# Patient Record
Sex: MALE | Race: WHITE | NOT HISPANIC OR LATINO | Employment: OTHER | URBAN - METROPOLITAN AREA
[De-identification: names, ages, dates, MRNs, and addresses within clinical notes are randomized per-mention and may not be internally consistent; named-entity substitution may affect disease eponyms.]

---

## 2017-02-24 DIAGNOSIS — I10 ESSENTIAL HYPERTENSION, BENIGN: ICD-10-CM

## 2017-02-24 RX ORDER — LOSARTAN POTASSIUM 100 MG/1
100 TABLET ORAL DAILY
Qty: 90 TABLET | Refills: 2 | Status: SHIPPED | OUTPATIENT
Start: 2017-02-24 | End: 2018-04-12

## 2017-02-24 NOTE — TELEPHONE ENCOUNTER
losartan (COZAAR) 100 MG tablet      Last Written Prescription Date: 8/09/2016  Last Fill Quantity: 90, # refills: 3  Last Office Visit with G, P or [facility] prescribing provider: 11/22/2016       Potassium   Date Value Ref Range Status   11/22/2016 4.3 3.4 - 5.3 mmol/L Final     Creatinine   Date Value Ref Range Status   11/22/2016 0.75 0.66 - 1.25 mg/dL Final     BP Readings from Last 3 Encounters:   12/20/16 (!) 147/98   12/05/16 137/79   11/22/16 142/72

## 2017-04-20 ENCOUNTER — MYC MEDICAL ADVICE (OUTPATIENT)
Dept: INTERNAL MEDICINE | Facility: CLINIC | Age: 67
End: 2017-04-20

## 2017-04-20 DIAGNOSIS — N52.9 IMPOTENCE OF ORGANIC ORIGIN: ICD-10-CM

## 2017-04-20 RX ORDER — SILDENAFIL 100 MG/1
100 TABLET, FILM COATED ORAL DAILY PRN
Qty: 6 TABLET | Refills: 8 | Status: SHIPPED | OUTPATIENT
Start: 2017-04-20 | End: 2017-04-20

## 2017-04-20 RX ORDER — SILDENAFIL 100 MG/1
100 TABLET, FILM COATED ORAL DAILY PRN
Qty: 6 TABLET | Refills: 8 | Status: SHIPPED | OUTPATIENT
Start: 2017-04-20 | End: 2018-02-15

## 2017-05-31 ENCOUNTER — OFFICE VISIT (OUTPATIENT)
Dept: SURGERY | Facility: CLINIC | Age: 67
End: 2017-05-31
Payer: COMMERCIAL

## 2017-05-31 VITALS
HEART RATE: 86 BPM | SYSTOLIC BLOOD PRESSURE: 145 MMHG | HEIGHT: 70 IN | DIASTOLIC BLOOD PRESSURE: 80 MMHG | BODY MASS INDEX: 26.63 KG/M2 | WEIGHT: 186 LBS

## 2017-05-31 DIAGNOSIS — R10.31 RIGHT GROIN PAIN: Primary | ICD-10-CM

## 2017-05-31 PROCEDURE — 99213 OFFICE O/P EST LOW 20 MIN: CPT | Performed by: SURGERY

## 2017-05-31 NOTE — LETTER
May 31, 2017      RE:  Saji Morrison-:  [birth year]    Patient returns in follow-up after laparoscopic inguinal hernia repair performed in 2016.  His initial consultation to place in 2016.  At that time he was experiencing right lower quadrant abdominal pain with physical exercise and prolonged standing.  He noted this also with playing golf. He was found on examination to have a right inguinal hernia.  He was taken to surgery on 2016 whereby an indirect right inguinal hernia as well as an indirect left inguinal hernia were fixed laparoscopically.  He was seen in follow-up on  and seemed to be doing reasonably well. He then went to [place] for the winter time.  He took a relatively prolonged period of recovery avoiding significant activity.  He however then slowly return to his normal activity.  Over time he is redeveloped pain within the right lower quadrant and inguinal/groin area particularly while playing golf.  He states this can get his severe as a seven out of 10.  He has been medicating with ibuprofen which seems to give relatively good pain control.  In his own words he describes this pain as being basically the same as what he had been experiencing prior to hernia repair.     On examination, his incisions are well-healed.  There is no evidence of recurrent hernia.  There is mild tenderness around the pubic tubercle on the right-hand side as well as within the inguinal canal.     I discussed with the patient his management options.  In the absence of recurrent hernia not certain what is continuing to cause his pain.  We discussed the fact that his preoperative pain was likely the best predictor of having some degree of postoperative pain particularly given the circumstance that he describes the symptoms as basically the same.  We discussed evaluation of this and have decided to proceed with an MRI of his pelvis.  We will get this imaging study and we will  re-visit the issue and decide upon a course of action.  His questions were all answered.       Alec Johnson MD

## 2017-05-31 NOTE — MR AVS SNAPSHOT
After Visit Summary   5/31/2017    Saji Morrison    MRN: [id number]           Patient Information     Date Of Birth          1950        Visit Information        Provider Department      5/31/2017 2:15 PM Alec Johnson MD [facility]      Today's Diagnoses     Right groin pain    -  1       Follow-ups after your visit        Your next 10 appointments already scheduled     Jun 05, 2017  5:00 PM CDT   MR PELVIS W/O & W CONTRAST with [id number]   [facility]    [address] [place] [address]   [phone]           Take your medicines as usual, unless your doctor tells you not to. Bring a list of your current medicines to your exam (including vitamins, minerals and over-the-counter drugs).  You will be given intravenous contrast for this exam. To prepare:   The day before your exam, drink extra fluids at least six 8-ounce glasses (unless your doctor tells you to restrict your fluids).   Have a blood test (creatinine test) within 30 days of your exam. Go to your clinic or Diagnostic Imaging Department for this test.  The MRI machine uses a strong magnet. Please wear clothes without metal (snaps, zippers). A sweatsuit works well, or we may give you a hospital gown.  Please remove any body piercings and hair extensions before you arrive. You will also remove watches, jewelry, hairpins, wallets, dentures, partial dental plates and hearing aids. You may wear contact lenses, and you may be able to wear your rings. We have a safe place to keep your personal items, but it is safer to leave them at home.   **IMPORTANT** THE INSTRUCTIONS BELOW ARE ONLY FOR THOSE PATIENTS WHO HAVE BEEN TOLD THEY WILL RECEIVE SEDATION OR GENERAL ANESTHESIA DURING THEIR MRI PROCEDURE:  IF YOU WILL RECEIVE SEDATION (take medicine to help you relax during your exam):   You must get the medicine from your  doctor before you arrive. Bring the medicine to the exam. Do not take it at home.   Arrive one hour early. Bring someone who can take you home after the test. Your medicine will make you sleepy. After the exam, you may not drive, take a bus or take a taxi by yourself.   No eating 8 hours before your exam. You may have clear liquids up until 4 hours before your exam. (Clear liquids include water, clear tea, black coffee and fruit juice without pulp.)  IF YOU WILL RECEIVE ANESTHESIA (be asleep for your exam):   Arrive 1 1/2 hours early. Bring someone who can take you home after the test. You may not drive, take a bus or take a taxi by yourself.   No eating 8 hours before your exam. You may have clear liquids up until 4 hours before your exam. (Clear liquids include water, clear tea, black coffee and fruit juice without pulp.)  Please call the Imaging Department at your exam site with any questions.            Jun 12, 2017 10:00 AM CDT   Return Visit with Alec Johnson MD   [facility]    [address], [address]  [facility] [address] [phone]              Future tests that were ordered for you today     Open Future Orders        Priority Expected Expires Ordered    MR Pelvis w/o & w Contrast Routine 5/31/2017 5/31/2018 5/31/2017            Who to contact     If you have questions or need follow up information about today's clinic visit or your schedule please contact [facility] directly at [phone].  Normal or non-critical lab and imaging results will be communicated to you by MyChart, letter or phone within 4 business days after the clinic has received the results. If you do not hear from us within 7 days, please contact the clinic through [facility] or phone. If you have a critical or abnormal lab result, we will notify you by phone as soon as possible.  Submit refill requests through [employer] or call your pharmacy and they will forward  "the refill request to us. Please allow 3 business days for your refill to be completed.          Additional Information About Your Visit        [employer] Information     [employer] gives you secure access to your electronic health record. If you see a primary care provider, you can also send messages to your care team and make appointments. If you have questions, please call your primary care clinic.  If you do not have a primary care provider, please call [phone] and they will assist you.        Care EveryWhere ID     This is your Care EveryWhere ID. This could be used by other organizations to access your [place] medical records  [id number]        Your Vitals Were     Pulse Height BMI (Body Mass Index)             86 5' 10\" (1.778 m) 26.69 kg/m2          Blood Pressure from Last 3 Encounters:   05/31/17 145/80   12/20/16 (!) 147/98   12/05/16 137/79    Weight from Last 3 Encounters:   05/31/17 186 lb (84.4 kg)   12/05/16 191 lb (86.6 kg)   11/22/16 194 lb 12.8 oz (88.4 kg)               Primary Care Provider Office Phone # Fax #    Yared Gallardo [phone]       [facility] [address] [facility] [address]        Thank you!     Thank you for choosing [facility]  for your care. Our goal is always to provide you with excellent care. Hearing back from our patients is one way we can continue to improve our services. Please take a few minutes to complete the written survey that you may receive in the mail after your visit with us. Thank you!             Your Updated Medication List - Protect others around you: Learn how to safely use, store and throw away your medicines at www.disposemymeds.org.          This list is accurate as of: 5/31/17  2:54 PM.  Always use your most recent med list.                   Brand Name Dispense Instructions for use    HYDROcodone-acetaminophen 5-325 MG per tablet    NORCO    20 tablet    Take 1-2 tablets by mouth every 4 hours as " needed for moderate to severe pain maximum 6 tablet(s) per day       losartan 100 MG tablet    COZAAR    90 tablet    Take 1 tablet (100 mg) by mouth daily       magnesium hydroxide 400 MG/5ML suspension    MILK OF MAGNESIA    360 mL    Take 30 mLs by mouth 2 times daily as needed for constipation       oxyCODONE 5 MG IR tablet    ROXICODONE    30 tablet    Take 1-2 tablets (5-10 mg) by mouth every 3 hours as needed for pain or other (Moderate to Severe)       sildenafil 100 MG cap/tab    VIAGRA    6 tablet    Take 1 tablet (100 mg) by mouth daily as needed for erectile dysfunction at least 30 minutes before intercourse.

## 2017-06-01 NOTE — PROGRESS NOTES
Patient returns in follow-up after laparoscopic inguinal hernia repair performed in December 2016.  His initial consultation to place in November 2016.  At that time he was experiencing right lower quadrant abdominal pain with physical exercise and prolonged standing.  He noted this also with playing golf.  He was found on examination to have a right inguinal hernia.  He was taken to surgery on December 5, 2016 whereby an indirect right inguinal hernia as well as an indirect left inguinal hernia were fixed laparoscopically.  He was seen in follow-up on 20 December and seemed to be doing reasonably well.  He then went to [place] for the winter time.  He took a relatively prolonged period of recovery avoiding significant activity.  He however then slowly return to his normal activity.  Over time he is redeveloped pain within the right lower quadrant and inguinal/groin area particularly while playing golf.  He states this can get his severe as a seven out of 10.  He has been medicating with ibuprofen which seems to give relatively good pain control.  In his own words he describes this pain as being basically the same as what he had been experiencing prior to hernia repair.    On examination, his incisions are well-healed.  There is no evidence of recurrent hernia.  There is mild tenderness around the pubic tubercle on the right-hand side as well as within the inguinal canal.    I discussed with the patient his management options.  In the absence of recurrent hernia not certain what is continuing to cause his pain.  We discussed the fact that his preoperative pain was likely the best predictor of having some degree of postoperative pain particularly given the circumstance that he describes the symptoms as basically the same.  We discussed evaluation of this and have decided to proceed with an MRI of his pelvis.  We will get this imaging study and we will re-visit the issue and decide upon a course of action.  His  questions were all answered.  Total time spent was 25 minutes with good of a 50% in face-to-face consultation.    Please route or send letter to:  Primary Care Provider (PCP)

## 2017-06-05 ENCOUNTER — HOSPITAL ENCOUNTER (OUTPATIENT)
Dept: MRI IMAGING | Facility: CLINIC | Age: 67
Discharge: HOME | End: 2017-06-05
Attending: SURGERY | Admitting: SURGERY
Payer: MEDICARE

## 2017-06-05 DIAGNOSIS — R10.31 RIGHT GROIN PAIN: ICD-10-CM

## 2017-06-05 PROCEDURE — 72195 MRI PELVIS W/O DYE: CPT

## 2017-06-06 NOTE — PROGRESS NOTES
Result reviewed, result normal or expected, please call patient    No recommendations for further treatment  Hernia repair looks good

## 2017-08-21 ENCOUNTER — OFFICE VISIT (OUTPATIENT)
Dept: INTERNAL MEDICINE | Facility: CLINIC | Age: 67
End: 2017-08-21
Payer: COMMERCIAL

## 2017-08-21 VITALS
OXYGEN SATURATION: 97 % | BODY MASS INDEX: 27.3 KG/M2 | HEART RATE: 56 BPM | HEIGHT: 70 IN | SYSTOLIC BLOOD PRESSURE: 138 MMHG | TEMPERATURE: 98.6 F | WEIGHT: 190.7 LBS | DIASTOLIC BLOOD PRESSURE: 80 MMHG

## 2017-08-21 DIAGNOSIS — Z11.59 NEED FOR HEPATITIS C SCREENING TEST: ICD-10-CM

## 2017-08-21 DIAGNOSIS — Z00.00 MEDICARE ANNUAL WELLNESS VISIT, SUBSEQUENT: Primary | ICD-10-CM

## 2017-08-21 DIAGNOSIS — Z12.5 SPECIAL SCREENING FOR MALIGNANT NEOPLASM OF PROSTATE: ICD-10-CM

## 2017-08-21 DIAGNOSIS — I10 ESSENTIAL HYPERTENSION, BENIGN: ICD-10-CM

## 2017-08-21 DIAGNOSIS — E78.5 HYPERLIPIDEMIA LDL GOAL <130: ICD-10-CM

## 2017-08-21 DIAGNOSIS — Z12.11 COLON CANCER SCREENING: ICD-10-CM

## 2017-08-21 LAB
ALBUMIN SERPL-MCNC: 4.1 G/DL (ref 3.4–5)
ALP SERPL-CCNC: 97 U/L (ref 40–150)
ALT SERPL W P-5'-P-CCNC: 21 U/L (ref 0–70)
ANION GAP SERPL CALCULATED.3IONS-SCNC: 2 MMOL/L (ref 3–14)
AST SERPL W P-5'-P-CCNC: 25 U/L (ref 0–45)
BILIRUB SERPL-MCNC: 0.9 MG/DL (ref 0.2–1.3)
BUN SERPL-MCNC: 17 MG/DL (ref 7–30)
CALCIUM SERPL-MCNC: 9.1 MG/DL (ref 8.5–10.1)
CHLORIDE SERPL-SCNC: 106 MMOL/L (ref 94–109)
CHOLEST SERPL-MCNC: 230 MG/DL
CO2 SERPL-SCNC: 31 MMOL/L (ref 20–32)
CREAT SERPL-MCNC: 0.83 MG/DL (ref 0.66–1.25)
GFR SERPL CREATININE-BSD FRML MDRD: >90 ML/MIN/1.7M2
GLUCOSE SERPL-MCNC: 96 MG/DL (ref 70–99)
HDLC SERPL-MCNC: 88 MG/DL
HGB BLD-MCNC: 14 G/DL (ref 13.3–17.7)
LDLC SERPL CALC-MCNC: 122 MG/DL
NONHDLC SERPL-MCNC: 142 MG/DL
POTASSIUM SERPL-SCNC: 4.8 MMOL/L (ref 3.4–5.3)
PROT SERPL-MCNC: 7.9 G/DL (ref 6.8–8.8)
PSA SERPL-ACNC: 2.86 UG/L (ref 0–4)
SODIUM SERPL-SCNC: 139 MMOL/L (ref 133–144)
TRIGL SERPL-MCNC: 102 MG/DL

## 2017-08-21 PROCEDURE — 36415 COLL VENOUS BLD VENIPUNCTURE: CPT | Performed by: INTERNAL MEDICINE

## 2017-08-21 PROCEDURE — 80053 COMPREHEN METABOLIC PANEL: CPT | Performed by: INTERNAL MEDICINE

## 2017-08-21 PROCEDURE — 85018 HEMOGLOBIN: CPT | Performed by: INTERNAL MEDICINE

## 2017-08-21 PROCEDURE — G0103 PSA SCREENING: HCPCS | Performed by: INTERNAL MEDICINE

## 2017-08-21 PROCEDURE — 99397 PER PM REEVAL EST PAT 65+ YR: CPT | Performed by: INTERNAL MEDICINE

## 2017-08-21 PROCEDURE — 80061 LIPID PANEL: CPT | Performed by: INTERNAL MEDICINE

## 2017-08-21 PROCEDURE — 86803 HEPATITIS C AB TEST: CPT | Performed by: INTERNAL MEDICINE

## 2017-08-21 NOTE — MR AVS SNAPSHOT
After Visit Summary   8/21/2017    Saji Morrison    MRN: [id number]           Patient Information     Date Of Birth          1950        Visit Information        Provider Department      8/21/2017 9:20 AM Yared Gallardo MD [facility]        Today's Diagnoses     Medicare annual wellness visit, subsequent    -  1    Essential hypertension, benign        Hyperlipidemia LDL goal <130        Special screening for malignant neoplasm of prostate        Colon cancer screening        Need for hepatitis C screening test          Care Instructions          Services Typically covered by Medicare Recommended Completed   Vaccines    Pneumonoccol    Influenza    Hepatitis B (if medium/high risk)     Once for patients after age 65    Yearly  Medium/high risk factors:    End Stage Kidney Disease    Hemophiliacs who received Factor XIII or IX concentrates    Clients of institutions for developmentally disabled    Persons who live in same house as a Hepatitis B carrier    Homosexual men    Illicit injectable drug users    Health care workers     Mammogram Covered: One-time screen between age 35-39, annually for age 40+     Pap and Pelvic Exam Covered: Annually if  high risk,  or childbearing age with abnormal Pap in last 3 years.  Q24 months for all other women     Prostate Cancer Screening    Digital rectal exam    PSA Covered: Annually for all men > age 50     Corolrectal Cancer Screening Screening colonoscopy every 10 years, more often for high risk patients     Diabetes Self-Management Training Requires referral by treating physician for patient with diabetes     Diabetes Screening    Fasting blood sugar or glucose tolerance test   Once yearly, twice yearly if prediabetic     Cardiovascular Screening Blood Tests    Total Cholesterol    HDL    Triglycerides Every 5 years     Medical Nutrition Therapy for Diabetes or Renal Disease Requires referral by treating physician for  patient with diabetes or kidney disease     Glaucoma Screening Annually for patients with one of the following risk factors:    Diabetes Mellitus    Family history of Glaucoma    -American age 50 and over    -American age 65 and over     Bone Mass Measurement Every 24 months if one of the following risk factors:    Estrogen deficiency    Vertebral abnormalities on x-ray indicative of Osteoporosis, Osteopenia, or Vertebral fracture    Receiving/expected to receive the equivalent of at least 5 mg of Prednisone per day for > 3 months    Hyperparathyroidism    Patient being monitored for response to Osteoporosis Therapy     One-time AAA screen  Must be ordered as part of Medicare IPPE   Any patient with a family history of AAA    Males Age 65-75, with history of smoking at least 100 cigarettes in lifetime     Smoking Cessation Counseling Beneficiaries who use tobacco are eligible to receive 2 cessation attempts per year; each attempt includes maximum of 4 sessions     HIV Screening Annually for beneficiaries at increased risk:       Increased risk for HIV infection is defined in the  National Coverage Determinations (NCD) Manual,  Publication 100-03 Sections 190.14 (diagnostic) and 210.7 (screening). See http://www.cms.gov/manuals/downloads/qsh478g3_Fbhp5.pdf and http://www.cms.gov/manuals/downloads/osl380h8_Frhg2.pdf on the Internet.  Three times per pregnancy for beneficiaries who are pregnant.     Future Annual Wellness Visit Annually, for all beneficiaries.               Follow-ups after your visit        Follow-up notes from your care team     Return if symptoms worsen or fail to improve.      Future tests that were ordered for you today     Open Future Orders        Priority Expected Expires Ordered    Fecal colorectal cancer screen (FIT) Routine 9/11/2017 11/13/2017 8/21/2017            Who to contact     If you have questions or need follow up information about today's clinic visit or your  "schedule please contact [facility] directly at [phone].  Normal or non-critical lab and imaging results will be communicated to you by MyChart, letter or phone within 4 business days after the clinic has received the results. If you do not hear from us within 7 days, please contact the clinic through [email or web address] or phone. If you have a critical or abnormal lab result, we will notify you by phone as soon as possible.  Submit refill requests through [employer] or call your pharmacy and they will forward the refill request to us. Please allow 3 business days for your refill to be completed.          Additional Information About Your Visit        [email or web address] Information     [employer] gives you secure access to your electronic health record. If you see a primary care provider, you can also send messages to your care team and make appointments. If you have questions, please call your primary care clinic.  If you do not have a primary care provider, please call [phone] and they will assist you.        Care EveryWhere ID     This is your Care EveryWhere ID. This could be used by other organizations to access your [place] medical records  [id number]        Your Vitals Were     Pulse Temperature Height Pulse Oximetry BMI (Body Mass Index)       56 98.6  F (37  C) (Oral) 5' 10\" (1.778 m) 97% 27.36 kg/m2        Blood Pressure from Last 3 Encounters:   08/21/17 138/80   05/31/17 145/80   12/20/16 (!) 147/98    Weight from Last 3 Encounters:   08/21/17 190 lb 11.2 oz (86.5 kg)   05/31/17 186 lb (84.4 kg)   12/05/16 191 lb (86.6 kg)              We Performed the Following     Comprehensive metabolic panel     Hemoglobin     Hepatitis C antibody     Lipid Profile     PSA, screen        Primary Care Provider Office Phone # Fax #    Yared Gallardo [phone]       [address] [facility] [address]        Equal Access to Services     DARCIE ROUSSEAU AH: Amena Justin, " wanelsonda adamaadaha, qaybta kaazael aguilar, lillie jiangaamaida ah. So [facility] [phone].    ATENCIÓN: Si isha mcgovern, tiene a beckham disposición servicios gratuitos de asistencia lingüística. Adán al [phone].    We comply with applicable federal civil rights laws and [place] laws. We do not discriminate on the basis of race, color, national origin, age, disability sex, sexual orientation or gender identity.            Thank you!     Thank you for choosing [facility]  for your care. Our goal is always to provide you with excellent care. Hearing back from our patients is one way we can continue to improve our services. Please take a few minutes to complete the written survey that you may receive in the mail after your visit with us. Thank you!             Your Updated Medication List - Protect others around you: Learn how to safely use, store and throw away your medicines at www.disposemymeds.org.          This list is accurate as of: 8/21/17  9:44 AM.  Always use your most recent med list.                   Brand Name Dispense Instructions for use Diagnosis    losartan 100 MG tablet    COZAAR    90 tablet    Take 1 tablet (100 mg) by mouth daily    Essential hypertension, benign       sildenafil 100 MG cap/tab    VIAGRA    6 tablet    Take 1 tablet (100 mg) by mouth daily as needed for erectile dysfunction at least 30 minutes before intercourse.    Impotence of organic origin

## 2017-08-21 NOTE — NURSING NOTE
"Chief Complaint   Patient presents with     Wellness Visit       Initial /80  Pulse 56  Temp 98.6  F (37  C) (Oral)  Ht 5' 10\" (1.778 m)  Wt 190 lb 11.2 oz (86.5 kg)  SpO2 97%  BMI 27.36 kg/m2 Estimated body mass index is 27.36 kg/(m^2) as calculated from the following:    Height as of this encounter: 5' 10\" (1.778 m).    Weight as of this encounter: 190 lb 11.2 oz (86.5 kg).  Medication Reconciliation: complete   Dang Mccartney CMA      "

## 2017-08-21 NOTE — PATIENT INSTRUCTIONS
Services Typically covered by Medicare Recommended Completed   Vaccines    Pneumonoccol    Influenza    Hepatitis B (if medium/high risk)     Once for patients after age 65    Yearly  Medium/high risk factors:    End Stage Kidney Disease    Hemophiliacs who received Factor XIII or IX concentrates    Clients of institutions for developmentally disabled    Persons who live in same house as a Hepatitis B carrier    Homosexual men    Illicit injectable drug users    Health care workers     Mammogram Covered: One-time screen between age 35-39, annually for age 40+     Pap and Pelvic Exam Covered: Annually if  high risk,  or childbearing age with abnormal Pap in last 3 years.  Q24 months for all other women     Prostate Cancer Screening    Digital rectal exam    PSA Covered: Annually for all men > age 50     Corolrectal Cancer Screening Screening colonoscopy every 10 years, more often for high risk patients     Diabetes Self-Management Training Requires referral by treating physician for patient with diabetes     Diabetes Screening    Fasting blood sugar or glucose tolerance test   Once yearly, twice yearly if prediabetic     Cardiovascular Screening Blood Tests    Total Cholesterol    HDL    Triglycerides Every 5 years     Medical Nutrition Therapy for Diabetes or Renal Disease Requires referral by treating physician for patient with diabetes or kidney disease     Glaucoma Screening Annually for patients with one of the following risk factors:    Diabetes Mellitus    Family history of Glaucoma    -American age 50 and over    -American age 65 and over     Bone Mass Measurement Every 24 months if one of the following risk factors:    Estrogen deficiency    Vertebral abnormalities on x-ray indicative of Osteoporosis, Osteopenia, or Vertebral fracture    Receiving/expected to receive the equivalent of at least 5 mg of Prednisone per day for > 3 months    Hyperparathyroidism    Patient being monitored for  response to Osteoporosis Therapy     One-time AAA screen  Must be ordered as part of Medicare IPPE   Any patient with a family history of AAA    Males Age 65-75, with history of smoking at least 100 cigarettes in lifetime     Smoking Cessation Counseling Beneficiaries who use tobacco are eligible to receive 2 cessation attempts per year; each attempt includes maximum of 4 sessions     HIV Screening Annually for beneficiaries at increased risk:       Increased risk for HIV infection is defined in the  National Coverage Determinations (NCD) Manual,  Publication 100-03 Sections 190.14 (diagnostic) and 210.7 (screening). See http://www.cms.gov/manuals/downloads/ywz967h2_Kabs4.pdf and http://www.cms.gov/manuals/downloads/pev311p0_Qvwa5.pdf on the Internet.  Three times per pregnancy for beneficiaries who are pregnant.     Future Annual Wellness Visit Annually, for all beneficiaries.

## 2017-08-21 NOTE — LETTER
[facility] [place] [place]  [address] [place] [address]  [phone]      8/22/2017       Saji Morrison  [address] [place] [address]      Dear Saji,      I have enclosed a copy of your most recent labs done here at the clinic and if available some of your prior labs for comparison.     I am pleased to inform you that your routine blood work including your hemoglobin, hepatitis C, sodium, potassium, calcium, kidney and liver function tests are all normal.    Your cholesterol is slightly high and could be treated more aggressively.  Please follow-up with me to discuss your further options if you are interested.    Your PSA test is normal but is slowly elevating higher than what I would consider to be normal and I would ask you follow-up to repeat this level in 6 months for reassurance.    Please call me if you have further questions.        Yared Gallardo MD

## 2017-08-21 NOTE — PROGRESS NOTES
SUBJECTIVE:   Saji Morrison is a 67 year old male who presents for Preventive Visit.  Are you in the first 12 months of your Medicare Part B coverage?  No    Healthy Habits:    Do you get at least three servings of calcium containing foods daily (dairy, green leafy vegetables, etc.)? yes    Amount of exercise or daily activities, outside of work: 5 day(s) per week    Problems taking medications regularly No    Medication side effects: No    Have you had an eye exam in the past two years? yes    Do you see a dentist twice per year? yes    Do you have sleep apnea, excessive snoring or daytime drowsiness?no    Reviewed and updated as needed this visit by clinical staffTobacco  Allergies  Med Hx  Surg Hx  Fam Hx  Soc Hx      Reviewed and updated as needed this visit by Provider        Social History   Substance Use Topics     Smoking status: Never Smoker     Smokeless tobacco: Never Used     Alcohol use 0.0 oz/week     0 Standard drinks or equivalent per week      Comment: Occasionally.       The patient does not drink >3 drinks per day nor >7 drinks per week.    Today's PHQ-2 Score:   PHQ-2 ( 1999 Pfizer) 8/21/2017 11/22/2016   Q1: Little interest or pleasure in doing things 0 0   Q2: Feeling down, depressed or hopeless 0 0   PHQ-2 Score 0 0     Do you feel safe in your environment - Yes    Do you have a Health Care Directive?: No: Advance care planning was reviewed with patient; patient declined at this time.      Current providers sharing in care for this patient include: Patient Care Team:  Yared Gallarod MD as PCP - General      Hearing impairment: No    Ability to successfully perform activities of daily living: Yes, no assistance needed     Fall risk:  Fall Risk Assessment not completed.    Home safety:  none identified    The following health maintenance items are reviewed in Epic and correct as of today:  Health Maintenance   Topic Date Due     HEPATITIS C SCREENING  07/20/1967     AORTIC  "ANEURYSM SCREENING (SYSTEM ASSIGNED)  07/20/2014     FALL RISK ASSESSMENT  08/09/2017     INFLUENZA VACCINE (SYSTEM ASSIGNED)  09/01/2017     COLONOSCOPY Q10 YR  04/18/2018     PSA Q2 YR  08/09/2018     LIPID SCREEN Q5 YR MALE (SYSTEM ASSIGNED)  08/09/2021     ADVANCE DIRECTIVE PLANNING Q5 YRS  11/04/2021     TETANUS IMMUNIZATION (SYSTEM ASSIGNED)  07/15/2024     PNEUMOCOCCAL  Completed       Immunization History   Administered Date(s) Administered     Influenza (IIV3) 12/14/2000, 11/26/2003     Pneumococcal (PCV 13) 08/09/2016     Pneumococcal 23 valent 09/16/2014     TDAP Vaccine (Adacel) 07/15/2014       ROS:  C: NEGATIVE for fever, chills, change in weight  E/M: NEGATIVE for ear, mouth and throat problems  R: NEGATIVE for significant cough or SOB  CV: NEGATIVE for chest pain, palpitations or peripheral edema  GI: NEGATIVE for nausea, abdominal pain, heartburn, or change in bowel habits  : NEGATIVE for frequency, dysuria, or hematuria  M: NEGATIVE for significant arthralgias or myalgia  H: NEGATIVE for bleeding problems  P: NEGATIVE for changes in mood or affect    OBJECTIVE:   /80  Pulse 56  Temp 98.6  F (37  C) (Oral)  Ht 5' 10\" (1.778 m)  Wt 190 lb 11.2 oz (86.5 kg)  SpO2 97%  BMI 27.36 kg/m2 Estimated body mass index is 27.36 kg/(m^2) as calculated from the following:    Height as of this encounter: 5' 10\" (1.778 m).    Weight as of this encounter: 190 lb 11.2 oz (86.5 kg).  EXAM:   GENERAL: alert and no distress  EYES: Eyes grossly normal to inspection, PERRL and conjunctivae and sclerae normal  HENT: ear canals and TM's normal, nose and mouth without ulcers or lesions, bilateral lid lag.  NECK: no adenopathy, no asymmetry, masses, or scars and thyroid normal to palpation  RESP: lungs clear to auscultation - no rales, rhonchi or wheezes  CV: regular rate and rhythm, normal S1 S2, no S3 or S4, no murmur, click or rub, no peripheral edema and peripheral pulses strong  ABDOMEN: soft, nontender, no " "hepatosplenomegaly, no masses and bowel sounds normal  RECTAL: normal sphincter tone, no rectal masses, prostate normal size, smooth, nontender without nodules or masses  MS: no gross musculoskeletal defects noted.  NEURO: No focal changes  PSYCH: mentation appears normal, affect normal/bright    ASSESSMENT / PLAN:   1. Medicare annual wellness visit, subsequent  Advised Zostavax  - Comprehensive metabolic panel  - Hemoglobin    2. Essential hypertension, benign  Stable on therapy  - Comprehensive metabolic panel    3. Hyperlipidemia LDL goal <130  Labs as fasting  - Lipid Profile    4. Special screening for malignant neoplasm of prostate  As screening  - PSA, screen    5. Colon cancer screening  As screening  - Fecal colorectal cancer screen (FIT); Future    ADVISED COLONOSCOPY FOR ROUTINE PREVENTATIVE CARE 4/2018    6. Need for hepatitis C screening test  As screening  - Hepatitis C antibody    End of Life Planning:  Patient currently has an advanced directive: No.  I have verified the patient's ablity to prepare an advanced directive/make health care decisions.  Literature was provided to assist patient in preparing an advanced directive.    COUNSELING:  Reviewed preventive health counseling, as reflected in patient instructions       Regular exercise       Healthy diet/nutrition      Estimated body mass index is 26.69 kg/(m^2) as calculated from the following:    Height as of 5/31/17: 5' 10\" (1.778 m).    Weight as of 5/31/17: 186 lb (84.4 kg).     reports that he has never smoked. He has never used smokeless tobacco.      Appropriate preventive services were discussed with this patient, including applicable screening as appropriate for cardiovascular disease, diabetes, osteopenia/osteoporosis, and glaucoma.  As appropriate for age/gender, discussed screening for colorectal cancer, prostate cancer. Checklist reviewing preventive services available has been given to the patient.    Reviewed patients plan of care " and provided an AVS. The Basic Care Plan (routine screening as documented in Health Maintenance) for Saji meets the Care Plan requirement. This Care Plan has been established and reviewed with the Patient.    Counseling Resources:  ATP IV Guidelines  Pooled Cohorts Equation Calculator  Breast Cancer Risk Calculator  FRAX Risk Assessment  ICSI Preventive Guidelines  Dietary Guidelines for Americans, 2010  USDA's MyPlate  ASA Prophylaxis  Lung CA Screening    Yared Gallardo MD  [facility]    THE MEDICATION LIST HAS BEEN FULLY RECONCILED BY THE M.D. AND THE NURSING STAFF.

## 2017-08-22 LAB — HCV AB SERPL QL IA: NONREACTIVE

## 2017-08-22 PROCEDURE — G0328 FECAL BLOOD SCRN IMMUNOASSAY: HCPCS | Performed by: INTERNAL MEDICINE

## 2017-08-24 DIAGNOSIS — Z12.11 COLON CANCER SCREENING: ICD-10-CM

## 2017-08-25 LAB — HEMOCCULT STL QL IA: NEGATIVE

## 2017-11-06 ENCOUNTER — OFFICE VISIT (OUTPATIENT)
Dept: INTERNAL MEDICINE | Facility: CLINIC | Age: 67
End: 2017-11-06
Payer: COMMERCIAL

## 2017-11-06 VITALS
HEIGHT: 70 IN | OXYGEN SATURATION: 99 % | BODY MASS INDEX: 28.06 KG/M2 | HEART RATE: 83 BPM | SYSTOLIC BLOOD PRESSURE: 132 MMHG | WEIGHT: 196 LBS | TEMPERATURE: 98 F | DIASTOLIC BLOOD PRESSURE: 72 MMHG

## 2017-11-06 DIAGNOSIS — Z01.818 PREOP GENERAL PHYSICAL EXAM: Primary | ICD-10-CM

## 2017-11-06 DIAGNOSIS — E78.5 HYPERLIPIDEMIA LDL GOAL <130: ICD-10-CM

## 2017-11-06 DIAGNOSIS — H02.403 PTOSIS, BOTH EYELIDS: ICD-10-CM

## 2017-11-06 DIAGNOSIS — I10 ESSENTIAL HYPERTENSION, BENIGN: ICD-10-CM

## 2017-11-06 LAB
ANION GAP SERPL CALCULATED.3IONS-SCNC: 4 MMOL/L (ref 3–14)
BUN SERPL-MCNC: 17 MG/DL (ref 7–30)
CALCIUM SERPL-MCNC: 8.9 MG/DL (ref 8.5–10.1)
CHLORIDE SERPL-SCNC: 109 MMOL/L (ref 94–109)
CO2 SERPL-SCNC: 29 MMOL/L (ref 20–32)
CREAT SERPL-MCNC: 0.8 MG/DL (ref 0.66–1.25)
GFR SERPL CREATININE-BSD FRML MDRD: >90 ML/MIN/1.7M2
GLUCOSE SERPL-MCNC: 85 MG/DL (ref 70–99)
POTASSIUM SERPL-SCNC: 4.3 MMOL/L (ref 3.4–5.3)
SODIUM SERPL-SCNC: 142 MMOL/L (ref 133–144)

## 2017-11-06 PROCEDURE — 36415 COLL VENOUS BLD VENIPUNCTURE: CPT | Performed by: PHYSICIAN ASSISTANT

## 2017-11-06 PROCEDURE — 99215 OFFICE O/P EST HI 40 MIN: CPT | Performed by: PHYSICIAN ASSISTANT

## 2017-11-06 PROCEDURE — 80048 BASIC METABOLIC PNL TOTAL CA: CPT | Performed by: PHYSICIAN ASSISTANT

## 2017-11-06 NOTE — PROGRESS NOTES
[address] [address]  [phone]  Dept: [phone]    PRE-OP EVALUATION:  Today's date: 2017    Saji Morrison (: 1950) presents for pre-operative evaluation assessment as requested by Dr. Zeng.  He requires evaluation and anesthesia risk assessment prior to undergoing surgery/procedure for treatment of  Ptosis bilateral eyelid .  Proposed procedure: eyelid lift, ptosis repair    Date of Surgery/ Procedure:   Time of Surgery/ Procedure: 730  Hospital/Surgical Facility: [facility] office [phone]  Primary Physician: Yared Gallardo  Type of Anesthesia Anticipated: General     Patient has a Health Care Directive or Living Will:  YES     1. NO - Do you have a history of heart attack, stroke, stent, bypass or surgery on an artery in the head, neck, heart or legs?  2. NO - Do you ever have any pain or discomfort in your chest?  3. NO - Do you have a history of  Heart Failure?  4. NO - Are you troubled by shortness of breath when: walking on the level, up a slight hill or at night?  5. NO - Do you currently have a cold, bronchitis or other respiratory infection?  6. NO - Do you have a cough, shortness of breath or wheezing?  7. NO - Do you sometimes get pains in the calves of your legs when you walk?  8. NO - Do you or anyone in your family have previous history of blood clots?  9. NO - Do you or does anyone in your family have a serious bleeding problem such as prolonged bleeding following surgeries or cuts?  10. NO - Have you ever had problems with anemia or been told to take iron pills?  11. NO - Have you had any abnormal blood loss such as black, tarry or bloody stools, or abnormal vaginal bleeding?  12. NO - Have you ever had a blood transfusion?  13. NO - Have you or any of your relatives ever had problems with anesthesia?  14. NO - Do you have sleep apnea, excessive snoring or  daytime drowsiness?  15. NO - Do you have any prosthetic heart valves?  16. NO - Do you have prosthetic joints?  17. NO - Is there any chance that you may be pregnant?        HPI:                                                      Brief HPI related to upcoming procedure: ptosis bilateral eyelids.       HYPERTENSION - Patient has longstanding history of mod-severe HTN , currently denies any symptoms referable to elevated blood pressure. Specifically denies chest pain, palpitations, dyspnea, orthopnea, PND or peripheral edema. Blood pressure readings have been in normal range. Current medication regimen is as listed below. Patient denies any side effects of medication.                                                                                                                                                                                          .  HYPERLIPIDEMIA - Patient has a long history of Hyperlipidemia requiring  No medication for treatment with recent good control.              .    MEDICAL HISTORY:                                                    Patient Active Problem List    Diagnosis Date Noted     ACP (advance care planning) 05/01/2012     Priority: Medium     Advance Care Planning 9/15/2016: Receipt of ACP document:  Received: Health Care Directive which was witnessed or notarized on 09/21/2015.  Document not previously scanned.  Validation form completed and sent with document to be scanned.  Code Status needs to be updated to reflect choices in most recent ACP document. Orders placed.  Confirmed/documented designated decision maker(s).  Added by Lidia Walker  Advance Care Planning 9/15/2015: Initial facilitation introduction:   Saji Morrison presented for ACP Facilitation session at a group session. He was accompanied by no one. Honoring Choices information provided and resources reviewed. He currently wishes to give additional consideration to ACP  He currently has the  following questions or concerns about Advance Care Planning: none.  Confirmed/documented legally designated decision maker(s).  Follow-up meeting: not needed/applicable. Added by Debbie Henderson RN Advance Care Planning Liaison with No[facility]  Advance Care Planning 5/1/2012 : Discussed advance care planning with patient; information given to patient to review.          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     Priority: Medium     Essential hypertension, benign 09/29/2003     Priority: Medium      Past Medical History:   Diagnosis Date     BENIGN HYPERTENSION 9/29/2003     Diaphragmatic hernia without mention of obstruction or gangrene 1989     Hyperlipidemia LDL goal <130 10/31/2010     Impotence of organic origin      IMPOTENCE, ORGANIC ORIGN 11/26/2003     Infectious mononucleosis 1960     Past Surgical History:   Procedure Laterality Date     C NONSPECIFIC PROCEDURE  1967    ski accident L leg (torn ligaments)     LAPAROSCOPIC HERNIORRHAPHY INGUINAL BILATERAL Bilateral 12/5/2016    Procedure: LAPAROSCOPIC HERNIORRHAPHY INGUINAL BILATERAL;  Surgeon: Alec Johnson MD;  Location:  OR     Current Outpatient Prescriptions   Medication Sig Dispense Refill     sildenafil (VIAGRA) 100 MG cap/tab Take 1 tablet (100 mg) by mouth daily as needed for erectile dysfunction at least 30 minutes before intercourse. 6 tablet 8     losartan (COZAAR) 100 MG tablet Take 1 tablet (100 mg) by mouth daily 90 tablet 2     OTC products: no recent use of OTC ASA, NSAIDS or Steroids    Allergies   Allergen Reactions     Cialis [Tadalafil]      Lisinopril      cough     Thiazide-Type Diuretics      PRINZIDE      Latex Allergy: NO    Social History   Substance Use Topics     Smoking status: Never Smoker     Smokeless tobacco: Never Used     Alcohol use 0.0 oz/week     0 Standard drinks or equivalent per week      Comment: Occasionally.     History   Drug Use  "No       REVIEW OF SYSTEMS:                                                    C: NEGATIVE for fever, chills, change in weight  INTEGUMENTARY/SKIN: NEGATIVE for worrisome rashes, moles or lesions  EYES: NEGATIVE for vision changes or irritation  E/M: NEGATIVE for ear, mouth and throat problems  R: NEGATIVE for significant cough or SOB  CV: NEGATIVE for chest pain, palpitations or peripheral edema  GI: NEGATIVE for nausea, abdominal pain, heartburn, or change in bowel habits  MUSCULOSKELETAL: NEGATIVE for significant arthralgias or myalgia  NEURO: NEGATIVE for weakness, dizziness or paresthesias  ENDOCRINE: NEGATIVE for temperature intolerance, skin/hair changes  HEME/ALLERGY/IMMUNE: NEGATIVE for bleeding problems  PSYCHIATRIC: NEGATIVE for changes in mood or affect  ROS otherwise negative    EXAM:                                                    /72 (BP Location: Left arm, Patient Position: Chair, Cuff Size: Adult Regular)  Pulse 83  Temp 98  F (36.7  C) (Oral)  Ht 5' 10\" (1.778 m)  Wt 196 lb (88.9 kg)  SpO2 99%  BMI 28.12 kg/m2  GENERAL APPEARANCE: healthy, alert and no distress  EYES: Eyes grossly normal to inspection, PERRL, conjunctivae and sclerae normal and eyelids- ptosis OU  HENT: ear canals and TM's normal and nose and mouth without ulcers or lesions  RESP: lungs clear to auscultation - no rales, rhonchi or wheezes  CV: regular rate and rhythm, normal S1 S2, no S3 or S4 and no murmur, click or rub   ABDOMEN: soft, nontender, no HSM or masses and bowel sounds normal  MS: extremities normal- no gross deformities noted  SKIN: no suspicious lesions or rashes  NEURO: Normal strength and tone, sensory exam grossly normal, mentation intact and speech normal  PSYCH: mentation appears normal and affect normal/bright    DIAGNOSTICS:                                                      EKG: Not indicated due to non-vascular surgery and last ekg Sinus rhythm 8/2016  Labs Drawn and in Process:   Unresulted " Labs Ordered in the Past 30 Days of this Admission     Date and Time Order Name Status Description    11/6/2017 0909 BASIC METABOLIC PANEL In process           Recent Labs   Lab Test  08/21/17   1008  11/22/16   1128  08/09/16   0840   HGB  14.0   --   13.2*   NA  139  139  139   POTASSIUM  4.8  4.3  4.0   CR  0.83  0.75  0.88        IMPRESSION:                                                    Reason for surgery/procedure: bilateral eyelid ptosis   Diagnosis/reason for consult: HTN, Hyperlipidemia     The proposed surgical procedure is considered LOW risk.    REVISED CARDIAC RISK INDEX  The patient has the following serious cardiovascular risks for perioperative complications such as (MI, PE, VFib and 3  AV Block):  No serious cardiac risks  INTERPRETATION: 0 risks: Class I (very low risk - 0.4% complication rate)    The patient has the following additional risks for perioperative complications:  No identified additional risks      ICD-10-CM    1. Preop general physical exam Z01.818 Basic metabolic panel   2. Ptosis, both eyelids H02.403 Basic metabolic panel   3. Essential hypertension, benign I10 Basic metabolic panel   4. Hyperlipidemia LDL goal <130 E78.5        RECOMMENDATIONS:                                                          --Patient is to take all scheduled medications on the day of surgery EXCEPT for modifications listed below.    Anticoagulant or Antiplatelet Medication Use  NSAIDS: stop 1 week prior         ACE Inhibitor or Angiotensin Receptor Blocker (ARB) Use  Ace inhibitor or Angiotensin Receptor Blocker (ARB) and will continue this medication - short procedure         APPROVAL GIVEN to proceed with proposed procedure, without further diagnostic evaluation       Signed Electronically by: Corina Andrade PA-C    Copy of this evaluation report is provided to requesting physician.    [facility] Preop Guidelines

## 2017-11-06 NOTE — NURSING NOTE
"Chief Complaint   Patient presents with     Pre-Op Exam     eyelid surgery-11/13       Initial /72 (BP Location: Left arm, Patient Position: Chair, Cuff Size: Adult Regular)  Pulse 83  Temp 98  F (36.7  C) (Oral)  Ht 5' 10\" (1.778 m)  Wt 196 lb (88.9 kg)  SpO2 99%  BMI 28.12 kg/m2 Estimated body mass index is 28.12 kg/(m^2) as calculated from the following:    Height as of this encounter: 5' 10\" (1.778 m).    Weight as of this encounter: 196 lb (88.9 kg).  Medication Reconciliation: complete    "

## 2017-11-06 NOTE — MR AVS SNAPSHOT
After Visit Summary   11/6/2017    Saji Morrison    MRN: [id number]           Patient Information     Date Of Birth          1950        Visit Information        Provider Department      11/6/2017 9:00 AM Corina Andrade PA-C [facility]        Today's Diagnoses     Preop general physical exam    -  1    Ptosis, both eyelids        Essential hypertension, benign        Hyperlipidemia LDL goal <130          Care Instructions      Before Your Surgery      Call your surgeon if there is any change in your health. This includes signs of a cold or flu (such as a sore throat, runny nose, cough, rash or fever).    Do not smoke, drink alcohol or take over the counter medicine (unless your surgeon or primary care doctor tells you to) for the 24 hours before and after surgery.    If you take prescribed drugs: Follow your doctor s orders about which medicines to take and which to stop until after surgery.    Eating and drinking prior to surgery: follow the instructions from your surgeon    Take a shower or bath the night before surgery. Use the soap your surgeon gave you to gently clean your skin. If you do not have soap from your surgeon, use your regular soap. Do not shave or scrub the surgery site.  Wear clean pajamas and have clean sheets on your bed.           Follow-ups after your visit        Who to contact     If you have questions or need follow up information about today's clinic visit or your schedule please contact [facility] directly at [phone].  Normal or non-critical lab and imaging results will be communicated to you by MyChart, letter or phone within 4 business days after the clinic has received the results. If you do not hear from us within 7 days, please contact the clinic through MyChart or phone. If you have a critical or abnormal lab result, we will notify you by phone as soon as possible.  Submit refill requests through  "MyChart or call your pharmacy and they will forward the refill request to us. Please allow 3 business days for your refill to be completed.          Additional Information About Your Visit        MyChart Information     [employer] gives you secure access to your electronic health record. If you see a primary care provider, you can also send messages to your care team and make appointments. If you have questions, please call your primary care clinic.  If you do not have a primary care provider, please call [phone] and they will assist you.        Care EveryWhere ID     This is your Care EveryWhere ID. This could be used by other organizations to access your [facility] medical records  [id number]        Your Vitals Were     Pulse Temperature Height Pulse Oximetry BMI (Body Mass Index)       83 98  F (36.7  C) (Oral) 5' 10\" (1.778 m) 99% 28.12 kg/m2        Blood Pressure from Last 3 Encounters:   11/06/17 132/72   08/21/17 138/80   05/31/17 145/80    Weight from Last 3 Encounters:   11/06/17 196 lb (88.9 kg)   08/21/17 190 lb 11.2 oz (86.5 kg)   05/31/17 186 lb (84.4 kg)              We Performed the Following     Basic metabolic panel        Primary Care Provider Office Phone # Fax #    Yared Gallardo [phone]       [address] [facility] [address]        Equal Access to Services     [facility]: Hadii aad ku hadasho Soomaali, waaxda luqadaha, qaybta kaalmada adeegyada, lillie levy hayirene acosta . So [facility] [place] [facility] [phone].    ATENCIÓN: Si habla español, tiene a beckham disposición servicios gratuitos de asistencia lingüística. Llame al [phone].    We comply with applicable federal civil rights laws and [place] laws. We do not discriminate on the basis of race, color, national origin, age, disability, sex, sexual orientation, or gender identity.            Thank you!     Thank you for choosing [facility]  for your care. Our goal is always to provide " you with excellent care. Hearing back from our patients is one way we can continue to improve our services. Please take a few minutes to complete the written survey that you may receive in the mail after your visit with us. Thank you!             Your Updated Medication List - Protect others around you: Learn how to safely use, store and throw away your medicines at www.disposemymeds.org.          This list is accurate as of: 11/6/17  9:28 AM.  Always use your most recent med list.                   Brand Name Dispense Instructions for use Diagnosis    losartan 100 MG tablet    COZAAR    90 tablet    Take 1 tablet (100 mg) by mouth daily    Essential hypertension, benign       sildenafil 100 MG tablet    VIAGRA    6 tablet    Take 1 tablet (100 mg) by mouth daily as needed for erectile dysfunction at least 30 minutes before intercourse.    Impotence of organic origin

## 2017-11-29 ENCOUNTER — APPOINTMENT (OUTPATIENT)
Age: 67
Setting detail: DERMATOLOGY
End: 2017-12-04

## 2017-11-29 DIAGNOSIS — L57.0 ACTINIC KERATOSIS: ICD-10-CM

## 2017-11-29 DIAGNOSIS — Z85.828 PERSONAL HISTORY OF OTHER MALIGNANT NEOPLASM OF SKIN: ICD-10-CM

## 2017-11-29 DIAGNOSIS — D22 MELANOCYTIC NEVI: ICD-10-CM

## 2017-11-29 DIAGNOSIS — L82.1 OTHER SEBORRHEIC KERATOSIS: ICD-10-CM

## 2017-11-29 DIAGNOSIS — L82.0 INFLAMED SEBORRHEIC KERATOSIS: ICD-10-CM

## 2017-11-29 DIAGNOSIS — L72.8 OTHER FOLLICULAR CYSTS OF THE SKIN AND SUBCUTANEOUS TISSUE: ICD-10-CM

## 2017-11-29 DIAGNOSIS — D18.0 HEMANGIOMA: ICD-10-CM

## 2017-11-29 DIAGNOSIS — Z71.89 OTHER SPECIFIED COUNSELING: ICD-10-CM

## 2017-11-29 DIAGNOSIS — L81.4 OTHER MELANIN HYPERPIGMENTATION: ICD-10-CM

## 2017-11-29 PROBLEM — D18.01 HEMANGIOMA OF SKIN AND SUBCUTANEOUS TISSUE: Status: ACTIVE | Noted: 2017-11-29

## 2017-11-29 PROBLEM — D22.5 MELANOCYTIC NEVI OF TRUNK: Status: ACTIVE | Noted: 2017-11-29

## 2017-11-29 PROCEDURE — OTHER LIQUID NITROGEN: OTHER

## 2017-11-29 PROCEDURE — OTHER MIPS QUALITY: OTHER

## 2017-11-29 PROCEDURE — OTHER SUNSCREEN RECOMMENDATIONS: OTHER

## 2017-11-29 PROCEDURE — 99214 OFFICE O/P EST MOD 30 MIN: CPT | Mod: 25

## 2017-11-29 PROCEDURE — 17003 DESTRUCT PREMALG LES 2-14: CPT

## 2017-11-29 PROCEDURE — OTHER COUNSELING: OTHER

## 2017-11-29 PROCEDURE — 17110 DESTRUCT B9 LESION 1-14: CPT

## 2017-11-29 PROCEDURE — 17000 DESTRUCT PREMALG LESION: CPT | Mod: 59

## 2017-11-29 ASSESSMENT — LOCATION DETAILED DESCRIPTION DERM
LOCATION DETAILED: LEFT RIB CAGE
LOCATION DETAILED: LEFT INFERIOR MEDIAL FOREHEAD
LOCATION DETAILED: LEFT SUPERIOR LATERAL NECK
LOCATION DETAILED: LEFT INFERIOR POSTERIOR HELIX
LOCATION DETAILED: RIGHT LATERAL MALAR CHEEK
LOCATION DETAILED: RIGHT SUPERIOR MEDIAL UPPER BACK
LOCATION DETAILED: RIGHT MID-UPPER BACK
LOCATION DETAILED: RIGHT ANTITRAGUS
LOCATION DETAILED: LEFT POSTERIOR SHOULDER
LOCATION DETAILED: RIGHT INFERIOR HELIX

## 2017-11-29 ASSESSMENT — LOCATION ZONE DERM
LOCATION ZONE: ARM
LOCATION ZONE: EAR
LOCATION ZONE: NECK
LOCATION ZONE: FACE
LOCATION ZONE: TRUNK

## 2017-11-29 ASSESSMENT — LOCATION SIMPLE DESCRIPTION DERM
LOCATION SIMPLE: RIGHT UPPER BACK
LOCATION SIMPLE: ABDOMEN
LOCATION SIMPLE: RIGHT EAR
LOCATION SIMPLE: LEFT SHOULDER
LOCATION SIMPLE: LEFT EAR
LOCATION SIMPLE: NECK
LOCATION SIMPLE: LEFT FOREHEAD
LOCATION SIMPLE: RIGHT CHEEK

## 2017-11-29 NOTE — PROCEDURE: LIQUID NITROGEN
Duration Of Freeze Thaw-Cycle (Seconds): 10
Post-Care Instructions: I reviewed with the patient in detail post-care instructions. (Written instructions given) Patient is to wear sun protection, and avoid picking at any of the treated lesions. Pt may apply Vaseline to crusted or scabbing areas.
Total Number Of Aks Treated: 4
Detail Level: Detailed
Number Of Freeze-Thaw Cycles: 1 freeze-thaw cycle
Consent: The patient's consent was obtained including but not limited to risks of crusting, scabbing, blistering, scarring, darker or lighter pigmentary change, recurrence, incomplete removal and infection.
Add 52 Modifier (Optional): no
Medical Necessity Clause: This procedure was medically necessary because the lesions that were treated were:
Render Post-Care Instructions In Note?: yes
Post-Care Instructions: I reviewed with the patient in detail post-care instructions. Patient is to wear sunprotection, and avoid picking at any of the treated lesions. Pt may apply Vaseline to crusted or scabbing areas.
Duration Of Freeze Thaw-Cycle (Seconds): 15-20
Medical Necessity Information: It is in your best interest to select a reason for this procedure from the list below. All of these items fulfill various CMS LCD requirements except the new and changing color options.
Detail Level: Simple
Total Number Of Aks Treated: 1

## 2017-11-29 NOTE — PROCEDURE: SUNSCREEN RECOMMENDATIONS
General Sunscreen Counseling: I recommended a broad spectrum (UVA/B blocking) sunscreen with a SPF of 30 or higher.  I explained that SPF 30 sunscreens block approximately 97 percent of the sun's harmful ultraviolet rays.  Sunscreens should be applied at least 15 minutes prior to expected sun exposure and then every 2 hours after that as long as sun exposure continues. If swimming or exercising, sunscreen should be reapplied every 45 minutes to an hour after getting wet or sweating.  One ounce, or the equivalent of a shot glass full of sunscreen, is adequate to protect the skin not covered by a bathing suit. I also recommended a lip balm with a SPF 30 sunscreen as well. Sun protective clothing can be used in lieu of sunscreen, but must be worn the entire time you are exposed to the sun's rays.  Such clothing is woven of fibers with a chemical structure that absorbs or reflects ultraviolet radiation.  The best hats for sun protection have a full 360 degree brim.  Baseball style caps do not protect the ears or the back and sides of the neck and are inadequate for effective sun protection.
Products Recommended: The following products were discussed:\\nAnthelios - La Roche Poussey\\nCoppertone Sport\\nNeutrogenia sunscreens (many to choose from)\\nIntellishade - Brittany (tinted)\\nDaily SPF 33 Protectant - Jigar Cardenas (non-tinted)
Detail Level: Detailed

## 2017-11-29 NOTE — PROCEDURE: MIPS QUALITY
Detail Level: Detailed
Quality 110: Preventive Care And Screening: Influenza Immunization: Influenza Immunization previously received during influenza season
Quality 131: Pain Assessment And Follow-Up: Pain assessment using a standardized tool is documented as negative, no follow-up plan required
Quality 226: Preventive Care And Screening: Tobacco Use: Screening And Cessation Intervention: Patient screened for tobacco and never smoked
Quality 431: Preventive Care And Screening: Unhealthy Alcohol Use - Screening: Patient screened for unhealthy alcohol use using a single question and scores less than 2 times per year

## 2018-02-15 ENCOUNTER — MYC MEDICAL ADVICE (OUTPATIENT)
Dept: INTERNAL MEDICINE | Facility: CLINIC | Age: 68
End: 2018-02-15

## 2018-02-15 DIAGNOSIS — N52.9 IMPOTENCE OF ORGANIC ORIGIN: ICD-10-CM

## 2018-02-15 RX ORDER — SILDENAFIL 100 MG/1
100 TABLET, FILM COATED ORAL DAILY PRN
Qty: 6 TABLET | Refills: 8 | Status: SHIPPED | OUTPATIENT
Start: 2018-02-15 | End: 2019-01-31

## 2018-02-15 NOTE — TELEPHONE ENCOUNTER
"Requested Prescriptions   Pending Prescriptions Disp Refills     sildenafil (VIAGRA) 100 MG tablet 6 tablet 8    Last Written Prescription Date:  9/062017  Last Fill Quantity: 90,  # refills: 0   Last office visit: 11/6/2017 with prescribing provider:  7/21/2017   Future Office Visit:     Sig: Take 1 tablet (100 mg) by mouth daily as needed at least 30 minutes before intercourse.    Erectile Dysfuction Protocol Failed    2/15/2018  2:37 PM       Failed - Absence of nitrates on medication list       Passed - Absence of Alpha Blockers on Med list       Passed - Recent or future visit with authorizing provider    Patient had office visit in the last year or has a visit in the next 30 days with authorizing provider.  See \"Patient Info\" tab in inbasket, or \"Choose Columns\" in Meds & Orders section of the refill encounter.            Passed - Patient is age 18 or older          "

## 2018-04-12 DIAGNOSIS — I10 ESSENTIAL HYPERTENSION, BENIGN: ICD-10-CM

## 2018-04-12 RX ORDER — LOSARTAN POTASSIUM 100 MG/1
TABLET ORAL
Qty: 90 TABLET | Refills: 1 | Status: SHIPPED | OUTPATIENT
Start: 2018-04-12 | End: 2018-08-28

## 2018-04-12 NOTE — TELEPHONE ENCOUNTER
"Requested Prescriptions   Pending Prescriptions Disp Refills     losartan (COZAAR) 100 MG tablet [Pharmacy Med Name: LOSARTAN 100MG   TAB] 90 tablet 2     Sig: TAKE ONE TABLET BY MOUTH ONCE DAILY    Angiotensin-II Receptors Passed    4/12/2018  8:16 AM       Passed - Blood pressure under 140/90 in past 12 months    BP Readings from Last 3 Encounters:   11/06/17 132/72   08/21/17 138/80   05/31/17 145/80                Passed - Recent (12 mo) or future (30 days) visit within the authorizing provider's specialty    Patient had office visit in the last 12 months or has a visit in the next 30 days with authorizing provider or within the authorizing provider's specialty.  See \"Patient Info\" tab in inbasket, or \"Choose Columns\" in Meds & Orders section of the refill encounter.           Passed - Patient is age 18 or older       Passed - Normal serum creatinine on file in past 12 months    Recent Labs   Lab Test  11/06/17   0915   CR  0.80            Passed - Normal serum potassium on file in past 12 months    Recent Labs   Lab Test  11/06/17   0915   POTASSIUM  4.3                    Last Written Prescription Date:  2/24/17  Last Fill Quantity: 90,  # refills: 2   Last office visit: 11/6/2017 with prescribing provider:  11/6/17   Future Office Visit:      "

## 2018-06-06 ENCOUNTER — TRANSFERRED RECORDS (OUTPATIENT)
Dept: HEALTH INFORMATION MANAGEMENT | Facility: CLINIC | Age: 68
End: 2018-06-06

## 2018-06-13 ENCOUNTER — TRANSFERRED RECORDS (OUTPATIENT)
Dept: HEALTH INFORMATION MANAGEMENT | Facility: CLINIC | Age: 68
End: 2018-06-13

## 2018-06-15 ENCOUNTER — TRANSFERRED RECORDS (OUTPATIENT)
Dept: HEALTH INFORMATION MANAGEMENT | Facility: CLINIC | Age: 68
End: 2018-06-15

## 2018-06-23 ENCOUNTER — HEALTH MAINTENANCE LETTER (OUTPATIENT)
Age: 68
End: 2018-06-23

## 2018-06-27 ENCOUNTER — TRANSFERRED RECORDS (OUTPATIENT)
Dept: HEALTH INFORMATION MANAGEMENT | Facility: CLINIC | Age: 68
End: 2018-06-27

## 2018-07-02 ENCOUNTER — OFFICE VISIT (OUTPATIENT)
Dept: URGENT CARE | Facility: URGENT CARE | Age: 68
End: 2018-07-02
Payer: COMMERCIAL

## 2018-07-02 VITALS
OXYGEN SATURATION: 96 % | WEIGHT: 180.4 LBS | TEMPERATURE: 100.8 F | DIASTOLIC BLOOD PRESSURE: 74 MMHG | RESPIRATION RATE: 16 BRPM | HEART RATE: 105 BPM | SYSTOLIC BLOOD PRESSURE: 130 MMHG | BODY MASS INDEX: 25.88 KG/M2

## 2018-07-02 DIAGNOSIS — R52 BODY ACHES: ICD-10-CM

## 2018-07-02 DIAGNOSIS — R31.9 HEMATURIA, UNSPECIFIED TYPE: ICD-10-CM

## 2018-07-02 DIAGNOSIS — R50.9 FEVER CHILLS: Primary | ICD-10-CM

## 2018-07-02 LAB
ALBUMIN SERPL-MCNC: 3.6 G/DL (ref 3.4–5)
ALBUMIN UR-MCNC: 100 MG/DL
ALP SERPL-CCNC: 99 U/L (ref 40–150)
ALT SERPL W P-5'-P-CCNC: 21 U/L (ref 0–70)
ANION GAP SERPL CALCULATED.3IONS-SCNC: 8 MMOL/L (ref 3–14)
APPEARANCE UR: CLEAR
AST SERPL W P-5'-P-CCNC: 18 U/L (ref 0–45)
BACTERIA #/AREA URNS HPF: ABNORMAL /HPF
BASOPHILS # BLD AUTO: 0 10E9/L (ref 0–0.2)
BASOPHILS NFR BLD AUTO: 0.2 %
BILIRUB SERPL-MCNC: 0.8 MG/DL (ref 0.2–1.3)
BILIRUB UR QL STRIP: ABNORMAL
BUN SERPL-MCNC: 16 MG/DL (ref 7–30)
CALCIUM SERPL-MCNC: 8.9 MG/DL (ref 8.5–10.1)
CHLORIDE SERPL-SCNC: 103 MMOL/L (ref 94–109)
CO2 SERPL-SCNC: 25 MMOL/L (ref 20–32)
COLOR UR AUTO: YELLOW
CREAT SERPL-MCNC: 0.79 MG/DL (ref 0.66–1.25)
DIFFERENTIAL METHOD BLD: ABNORMAL
EOSINOPHIL # BLD AUTO: 0 10E9/L (ref 0–0.7)
EOSINOPHIL NFR BLD AUTO: 0.1 %
ERYTHROCYTE [DISTWIDTH] IN BLOOD BY AUTOMATED COUNT: 13.2 % (ref 10–15)
GFR SERPL CREATININE-BSD FRML MDRD: >90 ML/MIN/1.7M2
GLUCOSE SERPL-MCNC: 116 MG/DL (ref 70–99)
GLUCOSE UR STRIP-MCNC: NEGATIVE MG/DL
HCT VFR BLD AUTO: 43.1 % (ref 40–53)
HGB BLD-MCNC: 14.2 G/DL (ref 13.3–17.7)
HGB UR QL STRIP: ABNORMAL
HYALINE CASTS #/AREA URNS LPF: ABNORMAL /LPF
KETONES UR STRIP-MCNC: NEGATIVE MG/DL
LEUKOCYTE ESTERASE UR QL STRIP: NEGATIVE
LYMPHOCYTES # BLD AUTO: 1.2 10E9/L (ref 0.8–5.3)
LYMPHOCYTES NFR BLD AUTO: 12.3 %
MCH RBC QN AUTO: 32.2 PG (ref 26.5–33)
MCHC RBC AUTO-ENTMCNC: 32.9 G/DL (ref 31.5–36.5)
MCV RBC AUTO: 98 FL (ref 78–100)
MONOCYTES # BLD AUTO: 0.8 10E9/L (ref 0–1.3)
MONOCYTES NFR BLD AUTO: 7.5 %
MUCOUS THREADS #/AREA URNS LPF: PRESENT /LPF
NEUTROPHILS # BLD AUTO: 8 10E9/L (ref 1.6–8.3)
NEUTROPHILS NFR BLD AUTO: 79.9 %
NITRATE UR QL: NEGATIVE
PH UR STRIP: 5.5 PH (ref 5–7)
PLATELET # BLD AUTO: 123 10E9/L (ref 150–450)
POTASSIUM SERPL-SCNC: 3.9 MMOL/L (ref 3.4–5.3)
PROT SERPL-MCNC: 8.4 G/DL (ref 6.8–8.8)
RBC # BLD AUTO: 4.41 10E12/L (ref 4.4–5.9)
RBC #/AREA URNS AUTO: ABNORMAL /HPF
SODIUM SERPL-SCNC: 136 MMOL/L (ref 133–144)
SOURCE: ABNORMAL
SP GR UR STRIP: >1.03 (ref 1–1.03)
UROBILINOGEN UR STRIP-ACNC: 2 EU/DL (ref 0.2–1)
WBC # BLD AUTO: 10 10E9/L (ref 4–11)
WBC #/AREA URNS AUTO: ABNORMAL /HPF

## 2018-07-02 PROCEDURE — 81001 URINALYSIS AUTO W/SCOPE: CPT | Performed by: PHYSICIAN ASSISTANT

## 2018-07-02 PROCEDURE — 87086 URINE CULTURE/COLONY COUNT: CPT | Performed by: PHYSICIAN ASSISTANT

## 2018-07-02 PROCEDURE — 36415 COLL VENOUS BLD VENIPUNCTURE: CPT | Performed by: PHYSICIAN ASSISTANT

## 2018-07-02 PROCEDURE — 85025 COMPLETE CBC W/AUTO DIFF WBC: CPT | Performed by: PHYSICIAN ASSISTANT

## 2018-07-02 PROCEDURE — 99214 OFFICE O/P EST MOD 30 MIN: CPT | Performed by: PHYSICIAN ASSISTANT

## 2018-07-02 PROCEDURE — 80053 COMPREHEN METABOLIC PANEL: CPT | Performed by: PHYSICIAN ASSISTANT

## 2018-07-02 RX ORDER — CIPROFLOXACIN 500 MG/1
500 TABLET, FILM COATED ORAL 2 TIMES DAILY
Qty: 20 TABLET | Refills: 0 | Status: SHIPPED | OUTPATIENT
Start: 2018-07-02 | End: 2018-08-28

## 2018-07-02 NOTE — MR AVS SNAPSHOT
After Visit Summary   7/2/2018    Saji Morrison    MRN: [id number]           Patient Information     Date Of Birth          1950        Visit Information        Provider Department      7/2/2018 3:45 PM Orlin Isabel PA-C [facility]        Today's Diagnoses     Fever chills    -  1    Body aches        Hematuria, unspecified type           Follow-ups after your visit        Who to contact     If you have questions or need follow up information about today's clinic visit or your schedule please contact [facility] directly at [phone].  Normal or non-critical lab and imaging results will be communicated to you by [employer], letter or phone within 4 business days after the clinic has received the results. If you do not hear from us within 7 days, please contact the clinic through [employer] or phone. If you have a critical or abnormal lab result, we will notify you by phone as soon as possible.  Submit refill requests through [employer] or call your pharmacy and they will forward the refill request to us. Please allow 3 business days for your refill to be completed.          Additional Information About Your Visit        MyChart Information     [employer] gives you secure access to your electronic health record. If you see a primary care provider, you can also send messages to your care team and make appointments. If you have questions, please call your primary care clinic.  If you do not have a primary care provider, please call [phone] and they will assist you.        Care EveryWhere ID     This is your Care EveryWhere ID. This could be used by other organizations to access your [facility] medical records  [id number]        Your Vitals Were     Pulse Temperature Respirations Pulse Oximetry BMI (Body Mass Index)       105 100.8  F (38.2  C) (Oral) 16 96% 25.88 kg/m2        Blood Pressure from Last 3 Encounters:   07/02/18 130/74    11/06/17 132/72   08/21/17 138/80    Weight from Last 3 Encounters:   07/02/18 180 lb 6.4 oz (81.8 kg)   11/06/17 196 lb (88.9 kg)   08/21/17 190 lb 11.2 oz (86.5 kg)              We Performed the Following     CBC with platelets differential     Comprehensive metabolic panel     UA with Microscopic reflex to Culture     Urine Culture Aerobic Bacterial          Today's Medication Changes          These changes are accurate as of 7/2/18  5:10 PM.  If you have any questions, ask your nurse or doctor.               Start taking these medicines.        Dose/Directions    ciprofloxacin 500 MG tablet   Commonly known as:  CIPRO   Used for:  Fever chills, Hematuria, unspecified type   Started by:  Orlin Isabel PA-C        Dose:  500 mg   Take 1 tablet (500 mg) by mouth 2 times daily   Quantity:  20 tablet   Refills:  0            Where to get your medicines      These medications were sent to [facility] [address] [place] [address] [address]  [address], [facility] [address]     Phone:  [phone]     ciprofloxacin 500 MG tablet                Primary Care Provider Office Phone # Fax #    Yared Gallardo [phone]       [address] [place] [phone]        Equal Access to Services     DARCIE ROUSSEAU [facility]: Hadii torres ku hadasho Soomaali, waaxda luqadaha, qaybta kaalmada adeegyada, waxay tianin bernard verde. So [facility] [phone].    ATENCIÓN: Si habla español, tiene a beckham disposición servicios gratuitos de asistencia lingüística. Llame al [phone].    We comply with applicable federal civil rights laws and [place] laws. We do not discriminate on the basis of race, color, national origin, age, disability, sex, sexual orientation, or gender identity.            Thank you!     Thank you for choosing [facility]  for your care. Our goal is always to provide you with excellent care. Hearing back from our patients is one way we  can continue to improve our services. Please take a few minutes to complete the written survey that you may receive in the mail after your visit with us. Thank you!             Your Updated Medication List - Protect others around you: Learn how to safely use, store and throw away your medicines at www.disposemymeds.org.          This list is accurate as of 7/2/18  5:10 PM.  Always use your most recent med list.                   Brand Name Dispense Instructions for use Diagnosis    ciprofloxacin 500 MG tablet    CIPRO    20 tablet    Take 1 tablet (500 mg) by mouth 2 times daily    Fever chills, Hematuria, unspecified type       losartan 100 MG tablet    COZAAR    90 tablet    TAKE ONE TABLET BY MOUTH ONCE DAILY    Essential hypertension, benign       sildenafil 100 MG tablet    VIAGRA    6 tablet    Take 1 tablet (100 mg) by mouth daily as needed at least 30 minutes before intercourse.    Impotence of organic origin

## 2018-07-02 NOTE — PROGRESS NOTES
SUBJECTIVE:   Saji Morrison is a 67 year old male presenting with a chief complaint of body aches, chills and fever like symptoms.  Onset of symptoms was 2 day(s) ago.  Course of illness is same.    Severity moderate  Current and Associated symptoms: fever  Treatment measures tried include Tylenol/Ibuprofen.  Predisposing factors include none.    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       ROS:  CONSTITUTIONAL:POSITIVE  for fever and chills  INTEGUMENTARY/SKIN: NEGATIVE for worrisome rashes, moles or lesions  ENT/MOUTH: Negative for throat  RESP:NEGATIVE for significant cough or SOB  CV: NEGATIVE for chest pain, palpitations or peripheral edema  GI: NEGATIVE for nausea, abdominal pain, heartburn, or change in bowel habits  MUSCULOSKELETAL: Positive for body aches  NEURO: NEGATIVE for weakness, dizziness or paresthesias    OBJECTIVE  :/74  Pulse 105  Temp 100.8  F (38.2  C) (Oral)  Resp 16  Wt 180 lb 6.4 oz (81.8 kg)  SpO2 96%  BMI 25.88 kg/m2  GENERAL APPEARANCE: healthy, alert and no distress  EYES: EOMI,  PERRL, conjunctiva clear  HENT: ear canals and TM's normal.  Nose and mouth without ulcers, erythema or lesions  NECK: supple, nontender, no lymphadenopathy  RESP: lungs clear to auscultation - no rales, rhonchi or wheezes  CV: regular rates and rhythm, normal S1 S2, no murmur noted  ABDOMEN:  soft,  nontender, no HSM or masses and bowel sounds normal  NEURO: Normal strength and tone, sensory exam grossly normal,  normal speech and mentation  SKIN: no suspicious lesions or rashes    Results for orders placed or performed in visit on 07/02/18   CBC with platelets differential   Result Value Ref Range    WBC 10.0 4.0 - 11.0 10e9/L    RBC Count 4.41 4.4 - 5.9 10e12/L    Hemoglobin 14.2 13.3 - 17.7 g/dL    Hematocrit 43.1 40.0 - 53.0 %    MCV 98 78 - 100 fl    MCH 32.2 26.5 - 33.0 pg    MCHC 32.9 31.5 - 36.5 g/dL    RDW 13.2 10.0 - 15.0 %    Platelet Count 123 (L) 150 - 450 10e9/L    Diff Method Automated Method     % Neutrophils 79.9 %    % Lymphocytes 12.3 %    % Monocytes 7.5 %    % Eosinophils 0.1 %    % Basophils 0.2 %    Absolute Neutrophil 8.0 1.6 - 8.3 10e9/L    Absolute Lymphocytes 1.2 0.8 - 5.3 10e9/L    Absolute Monocytes 0.8 0.0 - 1.3 10e9/L    Absolute Eosinophils 0.0 0.0 - 0.7 10e9/L    Absolute Basophils 0.0 0.0 - 0.2 10e9/L   UA with Microscopic reflex to Culture   Result Value Ref Range    Color Urine Yellow     Appearance Urine Clear     Glucose Urine Negative NEG^Negative mg/dL    Bilirubin Urine Small (A) NEG^Negative    Ketones Urine Negative NEG^Negative mg/dL    Specific Gravity Urine >1.030 1.003 - 1.035    pH Urine 5.5 5.0 - 7.0 pH    Protein Albumin Urine 100 (A) NEG^Negative mg/dL    Urobilinogen Urine 2.0 (H) 0.2 - 1.0 EU/dL    Nitrite Urine Negative NEG^Negative    Blood Urine Large (A) NEG^Negative    Leukocyte Esterase Urine Negative NEG^Negative    Source Midstream Urine     WBC Urine 0 - 5 OTO5^0 - 5 /HPF    RBC Urine 25-50 (A) OTO2^O - 2 /HPF    Hyaline Casts O - 2 OTO2^O - 2 /LPF    Bacteria Urine Many (A) NEG^Negative /HPF    Mucous Urine Present (A) NEG^Negative /LPF   Comprehensive metabolic panel   Result Value Ref Range    Sodium 136 133 - 144 mmol/L    Potassium 3.9 3.4 - 5.3 mmol/L    Chloride 103 94 - 109 mmol/L    Carbon Dioxide 25 20 - 32 mmol/L    Anion Gap 8 3 - 14  mmol/L    Glucose 116 (H) 70 - 99 mg/dL    Urea Nitrogen 16 7 - 30 mg/dL    Creatinine 0.79 0.66 - 1.25 mg/dL    GFR Estimate >90 >60 mL/min/1.7m2    GFR Estimate If Black >90 >60 mL/min/1.7m2    Calcium 8.9 8.5 - 10.1 mg/dL    Bilirubin Total 0.8 0.2 - 1.3 mg/dL    Albumin 3.6 3.4 - 5.0 g/dL    Protein Total 8.4 6.8 - 8.8 g/dL    Alkaline Phosphatase 99 40 - 150 U/L    ALT 21 0 - 70 U/L    AST 18 0 - 45 U/L       ASSESSMENT/PLAN:    ICD-10-CM    1. Fever chills R50.9 CBC with platelets differential     UA with Microscopic reflex to Culture     Comprehensive metabolic panel     Urine Culture Aerobic Bacterial     ciprofloxacin (CIPRO) 500 MG tablet   2. Body aches R52 CBC with platelets differential     UA with Microscopic reflex to Culture     Comprehensive metabolic panel     Urine Culture Aerobic Bacterial   3. Hematuria, unspecified type R31.9 ciprofloxacin (CIPRO) 500 MG tablet       Orders Placed This Encounter     CBC with platelets differential     UA with Microscopic reflex to Culture     Comprehensive metabolic panel     ciprofloxacin (CIPRO) 500 MG tablet       Urine culture pending  Fluids, rest  Follow up as needed  See orders in Epic

## 2018-07-03 LAB
BACTERIA SPEC CULT: NORMAL
SPECIMEN SOURCE: NORMAL

## 2018-07-23 ENCOUNTER — TRANSFERRED RECORDS (OUTPATIENT)
Dept: HEALTH INFORMATION MANAGEMENT | Facility: CLINIC | Age: 68
End: 2018-07-23

## 2018-08-28 ENCOUNTER — OFFICE VISIT (OUTPATIENT)
Dept: INTERNAL MEDICINE | Facility: CLINIC | Age: 68
End: 2018-08-28
Payer: COMMERCIAL

## 2018-08-28 VITALS
OXYGEN SATURATION: 96 % | HEART RATE: 73 BPM | DIASTOLIC BLOOD PRESSURE: 80 MMHG | SYSTOLIC BLOOD PRESSURE: 138 MMHG | HEIGHT: 70 IN | TEMPERATURE: 98.1 F | RESPIRATION RATE: 15 BRPM | WEIGHT: 182.4 LBS | BODY MASS INDEX: 26.11 KG/M2

## 2018-08-28 DIAGNOSIS — I10 ESSENTIAL HYPERTENSION, BENIGN: ICD-10-CM

## 2018-08-28 DIAGNOSIS — E78.5 HYPERLIPIDEMIA LDL GOAL <130: ICD-10-CM

## 2018-08-28 DIAGNOSIS — Z12.11 SCREEN FOR COLON CANCER: ICD-10-CM

## 2018-08-28 DIAGNOSIS — R35.1 NOCTURIA: ICD-10-CM

## 2018-08-28 DIAGNOSIS — Z12.5 SPECIAL SCREENING FOR MALIGNANT NEOPLASM OF PROSTATE: ICD-10-CM

## 2018-08-28 DIAGNOSIS — Z00.00 MEDICARE ANNUAL WELLNESS VISIT, SUBSEQUENT: Primary | ICD-10-CM

## 2018-08-28 DIAGNOSIS — M53.3 SACROILIAC JOINT PAIN: ICD-10-CM

## 2018-08-28 DIAGNOSIS — Z12.11 COLON CANCER SCREENING: ICD-10-CM

## 2018-08-28 LAB
CHOLEST SERPL-MCNC: 236 MG/DL
ERYTHROCYTE [DISTWIDTH] IN BLOOD BY AUTOMATED COUNT: 13.3 % (ref 10–15)
HCT VFR BLD AUTO: 40.7 % (ref 40–53)
HDLC SERPL-MCNC: 78 MG/DL
HGB BLD-MCNC: 13.4 G/DL (ref 13.3–17.7)
LDLC SERPL CALC-MCNC: 134 MG/DL
MCH RBC QN AUTO: 32.4 PG (ref 26.5–33)
MCHC RBC AUTO-ENTMCNC: 32.9 G/DL (ref 31.5–36.5)
MCV RBC AUTO: 99 FL (ref 78–100)
NONHDLC SERPL-MCNC: 158 MG/DL
PLATELET # BLD AUTO: 149 10E9/L (ref 150–450)
PSA SERPL-ACNC: 2.53 UG/L (ref 0–4)
RBC # BLD AUTO: 4.13 10E12/L (ref 4.4–5.9)
TRIGL SERPL-MCNC: 121 MG/DL
WBC # BLD AUTO: 5.2 10E9/L (ref 4–11)

## 2018-08-28 PROCEDURE — 85027 COMPLETE CBC AUTOMATED: CPT | Performed by: INTERNAL MEDICINE

## 2018-08-28 PROCEDURE — 99397 PER PM REEVAL EST PAT 65+ YR: CPT | Performed by: INTERNAL MEDICINE

## 2018-08-28 PROCEDURE — 80061 LIPID PANEL: CPT | Performed by: INTERNAL MEDICINE

## 2018-08-28 PROCEDURE — G0103 PSA SCREENING: HCPCS | Performed by: INTERNAL MEDICINE

## 2018-08-28 PROCEDURE — 36415 COLL VENOUS BLD VENIPUNCTURE: CPT | Performed by: INTERNAL MEDICINE

## 2018-08-28 RX ORDER — DICLOFENAC SODIUM 75 MG/1
TABLET, DELAYED RELEASE ORAL
COMMUNITY
Start: 2018-08-23 | End: 2020-08-31

## 2018-08-28 RX ORDER — LOSARTAN POTASSIUM 100 MG/1
100 TABLET ORAL DAILY
Qty: 90 TABLET | Refills: 3 | Status: SHIPPED | OUTPATIENT
Start: 2018-08-28 | End: 2019-09-04

## 2018-08-28 ASSESSMENT — ACTIVITIES OF DAILY LIVING (ADL)
CURRENT_FUNCTION: NO ASSISTANCE NEEDED
I_NEED_ASSISTANCE_FOR_THE_FOLLOWING_DAILY_ACTIVITIES:: NO ASSISTANCE IS NEEDED

## 2018-08-28 NOTE — LETTER
[facility]  [address] [address]  [phone]      8/28/2018       Saji Morrison  [address] [place] [address]        Dear Saji,    Your hemoglobin is normal.    Your cholesterol is high and could be treated more aggressively.  Please follow-up with me to discuss your further options if you are interested.    In addition, your PSA or prostate screening antigen is stable and should be repeated annually.    The platelet counts are slightly low but stable and should at least be checked every 6-12 months for reassurance      Sincerely,      Yared Gallardo MD  Internal Medicine

## 2018-08-28 NOTE — MR AVS SNAPSHOT
After Visit Summary   8/28/2018    Saji Morrison    MRN: [id number]           Patient Information     Date Of Birth          1950        Visit Information        Provider Department      8/28/2018 8:00 AM Yared Gallardo MD [facility]        Today's Diagnoses     Medicare annual wellness visit, subsequent    -  1    Essential hypertension, benign        Hyperlipidemia LDL goal <130        Sacroiliac joint pain        Special screening for malignant neoplasm of prostate        Colon cancer screening        Screen for colon cancer        Nocturia          Care Instructions      Preventive Health Recommendations:       Male Ages 65 and over    Yearly exam:             See your health care provider every year in order to  o   Review health changes.   o   Discuss preventive care.    o   Review your medicines if your doctor has prescribed any.    Talk with your health care provider about whether you should have a test to screen for prostate cancer (PSA).    Every 3 years, have a diabetes test (fasting glucose). If you are at risk for diabetes, you should have this test more often.    Every 5 years, have a cholesterol test. Have this test more often if you are at risk for high cholesterol or heart disease.     Every 10 years, have a colonoscopy. Or, have a yearly FIT test (stool test). These exams will check for colon cancer.    Talk to with your health care provider about screening for Abdominal Aortic Aneurysm if you have a family history of AAA or have a history of smoking.  Shots:     Get a flu shot each year.     Get a tetanus shot every 10 years.     Talk to your doctor about your pneumonia vaccines. There are now two you should receive - Pneumovax (PPSV 23) and Prevnar (PCV 13).    Talk to your pharmacist about a shingles vaccine.     Talk to your doctor about the hepatitis B vaccine.  Nutrition:     Eat at least 5 servings of fruits and vegetables each day.     Eat  whole-grain bread, whole-wheat pasta and brown rice instead of white grains and rice.     Get adequate Calcium and Vitamin D.   Lifestyle    Exercise for at least 150 minutes a week (30 minutes a day, 5 days a week). This will help you control your weight and prevent disease.     Limit alcohol to one drink per day.     No smoking.     Wear sunscreen to prevent skin cancer.     See your dentist every six months for an exam and cleaning.     See your eye doctor every 1 to 2 years to screen for conditions such as glaucoma, macular degeneration and cataracts.          Follow-ups after your visit        Additional Services     GASTROENTEROLOGY ADULT REF PROCEDURE ONLY [facility] [phone]; No Provider Preference       Last Lab Result: Creatinine (mg/dL)       Date                     Value                 07/02/2018               0.79             ----------  There is no height or weight on file to calculate BMI.     Needed:  No  Language:  English    Patient will be contacted to schedule procedure.     Please be aware that coverage of these services is subject to the terms and limitations of your health insurance plan.  Call member services at your health plan with any benefit or coverage questions.  Any procedures must be performed at a [facility] facility OR coordinated by your clinic's referral office.    Please bring the following with you to your appointment:    (1) Any X-Rays, CTs or MRIs which have been performed.  Contact the facility where they were done to arrange for  prior to your scheduled appointment.    (2) List of current medications   (3) This referral request   (4) Any documents/labs given to you for this referral                  Follow-up notes from your care team     Return if symptoms worsen or fail to improve.      Who to contact     If you have questions or need follow up information about today's clinic visit or your schedule please contact [facility] directly at [phone].  Normal or non-critical lab and imaging results will be communicated to you by MyChart, letter or phone within 4 business days after the clinic has received the results. If you do not hear from us within 7 days, please contact the clinic through [employer] or phone. If you have a critical or abnormal lab result, we will notify you by phone as soon as possible.  Submit refill requests through [employer] or call your pharmacy and they will forward the refill request to us. Please allow 3 business days for your refill to be completed.          Additional Information About Your Visit        [employer] Information     [employer] gives you secure access to your electronic health record. If you see a primary care provider, you can also send messages to your care team and make appointments. If you have questions, please call your primary care clinic.  If you do not have a primary care provider, please call [phone] and they will assist you.        Care EveryWhere ID     This is your Care EveryWhere ID. This could be used by other organizations to access your [place] medical records  [id number]        Your Vitals Were     Pulse Temperature Respirations Height Pulse Oximetry BMI (Body Mass Index)    73 98.1  F (36.7  C) (Oral) 15 5' 10\" (1.778 m) 96% 26.17 kg/m2       Blood Pressure from Last 3 Encounters:   08/28/18 138/80   07/02/18 130/74   11/06/17 132/72    Weight from Last 3 Encounters:   08/28/18 182 lb 6.4 oz (82.7 kg)   07/02/18 180 lb 6.4 oz (81.8 kg)   11/06/17 196 lb (88.9 kg)              We Performed the Following     CBC with platelets     GASTROENTEROLOGY ADULT REF PROCEDURE ONLY [facility] [phone]; No Provider Preference     Lipid Profile     PSA, screen          Today's Medication Changes          These changes are accurate as of 8/28/18  9:08 AM.  If you have any questions, ask your nurse or doctor.               These medicines have changed or have " updated prescriptions.        Dose/Directions    losartan 100 MG tablet   Commonly known as:  COZAAR   This may have changed:  See the new instructions.   Changed by:  Yared Gallardo MD        Dose:  100 mg   Take 1 tablet (100 mg) by mouth daily   Quantity:  90 tablet   Refills:  3            Where to get your medicines      These medications were sent to [facility] [place] - [address]  [address], [place] [address]     Phone:  [phone]     losartan 100 MG tablet                Primary Care Provider Office Phone # Fax #    Yared Gallardo [phone]       [address] [facility] [address]        Equal Access to Services     [facility]: Hadii torres drake hadasho Soomaali, waaxda luqadaha, qaybta kaalmada adeegyada, waxroderick overtonin hayirene acosta . So [facility] [phone].    ATENCIÓN: Si habla español, tiene a beckham disposición servicios gratuitos de asistencia lingüística. Llame al [phone].    We comply with applicable federal civil rights laws and [place] laws. We do not discriminate on the basis of race, color, national origin, age, disability, sex, sexual orientation, or gender identity.            Thank you!     Thank you for choosing [facility]  for your care. Our goal is always to provide you with excellent care. Hearing back from our patients is one way we can continue to improve our services. Please take a few minutes to complete the written survey that you may receive in the mail after your visit with us. Thank you!             Your Updated Medication List - Protect others around you: Learn how to safely use, store and throw away your medicines at www.disposemymeds.org.          This list is accurate as of 8/28/18  9:08 AM.  Always use your most recent med list.                   Brand Name Dispense Instructions for use Diagnosis    diclofenac 75 MG EC tablet    VOLTAREN          losartan 100 MG  tablet    COZAAR    90 tablet    Take 1 tablet (100 mg) by mouth daily        sildenafil 100 MG tablet    VIAGRA    6 tablet    Take 1 tablet (100 mg) by mouth daily as needed at least 30 minutes before intercourse.    Impotence of organic origin

## 2018-08-28 NOTE — PROGRESS NOTES
SUBJECTIVE:   Saji Morrison is a 68 year old male who presents for Preventive Visit.  Are you in the first 12 months of your Medicare Part B coverage?  No    Answers for HPI/ROS submitted by the patient on 8/28/2018   Annual Exam:  Getting at least 3 servings of Calcium per day:: Yes  Bi-annual eye exam:: Yes  Dental care twice a year:: Yes  Sleep apnea or symptoms of sleep apnea:: None  Diet:: Regular (no restrictions)  Frequency of exercise:: None  Taking medications regularly:: Yes  Medication side effects:: None  Additional concerns today:: No  Activities of Daily Living: no assistance needed  Home safety: no safety concerns identified  Hearing Impairment:: no hearing concerns  PHQ-2 Score: 0      PROBLEMS TO ADD ON:  1. Grown on left side of neck  2. Seeing [facility] for Ankylosing spondylitis of multiplie sites of spine. Referred to Rheumatology (Dr. Latif) and has appointment tomorrow    Reviewed and updated as needed this visit by clinical staff       Reviewed and updated as needed this visit by Provider        Social History   Substance Use Topics     Smoking status: Never Smoker     Smokeless tobacco: Never Used     Alcohol use 0.0 oz/week     0 Standard drinks or equivalent per week      Comment: Occasionally.       If you drink alcohol do you typically have >3 drinks per day or >7 drinks per week? No                        Today's PHQ-2 Score:   PHQ-2 ( 1999 Pfizer) 8/21/2017 11/22/2016   Q1: Little interest or pleasure in doing things 0 0   Q2: Feeling down, depressed or hopeless 0 0   PHQ-2 Score 0 0       Do you feel safe in your environment - Yes    Do you have a Health Care Directive?: No: Advance care planning was reviewed with patient; patient declined at this time.    Current providers sharing in care for this patient include:   Patient Care Team:  Yared Gallardo MD as PCP - General    The following health maintenance items are reviewed in Epic and correct as of today:  Health Maintenance  "  Topic Date Due     AORTIC ANEURYSM SCREENING (SYSTEM ASSIGNED)  07/20/2014     COLONOSCOPY Q10 YR  04/18/2018     FALL RISK ASSESSMENT  08/21/2018     PHQ-2 Q1 YR  08/21/2018     INFLUENZA VACCINE (1) 09/01/2018     PSA Q2 YR  08/21/2019     ADVANCE DIRECTIVE PLANNING Q5 YRS  11/04/2021     LIPID SCREEN Q5 YR MALE (SYSTEM ASSIGNED)  08/21/2022     TETANUS IMMUNIZATION (SYSTEM ASSIGNED)  07/15/2024     PNEUMOCOCCAL  Completed     HEPATITIS C SCREENING  Completed       Immunization History   Administered Date(s) Administered     Influenza (High Dose) 3 valent vaccine 09/25/2017     Influenza (IIV3) PF 12/14/2000, 11/26/2003     Pneumo Conj 13-V (2010&after) 08/09/2016     Pneumococcal 23 valent 09/16/2014     TDAP Vaccine (Adacel) 07/15/2014         ROS:  CONSTITUTIONAL: NEGATIVE for fever, chills, change in weight  INTEGUMENTARY/SKIN: NEGATIVE for worrisome rashes, moles or lesions  EYES: NEGATIVE for vision changes or irritation  ENT/MOUTH: NEGATIVE for ear, mouth and throat problems  RESP: NEGATIVE for significant cough or SOB  CV: NEGATIVE for chest pain, palpitations or peripheral edema  GI: NEGATIVE for nausea, abdominal pain, heartburn, or change in bowel habits  : NEGATIVE for frequency, dysuria, or hematuria, mild nocturia  MUSCULOSKELETAL: NEGATIVE for significant arthralgias or myalgia  HEME: NEGATIVE for bleeding problems  PSYCHIATRIC: NEGATIVE for changes in mood or affect    OBJECTIVE:   /80  Pulse 73  Temp 98.1  F (36.7  C) (Oral)  Resp 15  Ht 5' 10\" (1.778 m)  Wt 182 lb 6.4 oz (82.7 kg)  SpO2 96%  BMI 26.17 kg/m2 Estimated body mass index is 25.88 kg/(m^2) as calculated from the following:    Height as of 11/6/17: 5' 10\" (1.778 m).    Weight as of 7/2/18: 180 lb 6.4 oz (81.8 kg).  EXAM:   GENERAL: alert and no distress  EYES: Eyes grossly normal to inspection, PERRL and conjunctivae and sclerae normal  HENT: ear canals and TM's normal, nose and mouth without ulcers or lesions  NECK: no " adenopathy, no asymmetry, masses, or scars and thyroid normal to palpation  Cyst 1 cm, noted left neck.  RESP: lungs clear to auscultation - no rales, rhonchi or wheezes  CV: regular rate and rhythm, normal S1 S2, no S3 or S4, no murmur, click or rub, no peripheral edema and peripheral pulses strong  ABDOMEN: soft, nontender, no hepatosplenomegaly, no masses and bowel sounds normal.  RECTAL: normal sphincter tone, no rectal masses, prostate increased size, smooth, nontender without nodules or masses  MS: no gross musculoskeletal defects noted, no edema  NEURO: Normal strength and tone, mentation intact and speech normal  PSYCH: mentation appears normal, affect normal/bright      ASSESSMENT / PLAN:   1. Medicare annual wellness visit, subsequent  Discussed that shingles vaccination.  Advised patient that there is a soft tissue nodule noted to the left lateral neck and he is seeing his dermatologist shortly and will get this excised at that point.  This appears to be cystlike.    2. Essential hypertension, benign  Stable on therapy as directed  - CBC with platelets    3. Hyperlipidemia LDL goal <130  Labs ordered per routine  - Lipid Profile    4. Sacroiliac joint pain  Followed at outside orthopedic clinic for noted hip and back pain and will be sending me the old record    5. Special screening for malignant neoplasm of prostate  Routine screening advised, offered trial of Flomax.  - PSA, screen    6. Colon cancer screening  ADVISED COLONOSCOPY FOR ROUTINE PREVENTATIVE CARE.  - GASTROENTEROLOGY ADULT REF PROCEDURE ONLY [facility] [phone]; No Provider Preference      8. Nocturia  Discussed use of Flomax as trial but patient is not interested at this point      End of Life Planning:  Patient currently has an advanced directive: No.  I have verified the patient's ablity to prepare an advanced directive/make health care decisions.  Literature was provided to assist patient in preparing an advanced  "directive.    COUNSELING:  Reviewed preventive health counseling, as reflected in patient instructions       Regular exercise       Healthy diet/nutrition    BP Readings from Last 1 Encounters:   07/02/18 130/74     Estimated body mass index is 25.88 kg/(m^2) as calculated from the following:    Height as of 11/6/17: 5' 10\" (1.778 m).    Weight as of 7/2/18: 180 lb 6.4 oz (81.8 kg).           reports that he has never smoked. He has never used smokeless tobacco.      Appropriate preventive services were discussed with this patient, including applicable screening as appropriate for cardiovascular disease, diabetes, osteopenia/osteoporosis, and glaucoma.  As appropriate for age/gender, discussed screening for colorectal cancer, prostate cancer, breast cancer, and cervical cancer. Checklist reviewing preventive services available has been given to the patient.    Reviewed patients plan of care and provided an AVS. The Basic Care Plan (routine screening as documented in Health Maintenance) for Saji meets the Care Plan requirement. This Care Plan has been established and reviewed with the Patient.    Counseling Resources:  ATP IV Guidelines  Pooled Cohorts Equation Calculator  Breast Cancer Risk Calculator  FRAX Risk Assessment  ICSI Preventive Guidelines  Dietary Guidelines for Americans, 2010  [employer]'s MyPlate  ASA Prophylaxis  Lung CA Screening    Yared Gallardo MD  [facility]  "

## 2018-08-29 ENCOUNTER — TRANSFERRED RECORDS (OUTPATIENT)
Dept: HEALTH INFORMATION MANAGEMENT | Facility: CLINIC | Age: 68
End: 2018-08-29

## 2018-08-29 LAB
ALT SERPL-CCNC: 17 U/L (ref 9–55)
AST SERPL-CCNC: 23 U/L (ref 10–40)
CREAT SERPL-MCNC: 0.76 MG/DL (ref 0.73–1.18)
GFR SERPL CREATININE-BSD FRML MDRD: >60 ML/MIN/1.73M2
GLUCOSE SERPL-MCNC: 111 MG/DL (ref 70–100)
POTASSIUM SERPL-SCNC: 4.2 MMOL/L (ref 3.5–5.2)

## 2018-10-01 ENCOUNTER — MYC MEDICAL ADVICE (OUTPATIENT)
Dept: INTERNAL MEDICINE | Facility: CLINIC | Age: 68
End: 2018-10-01

## 2018-10-01 DIAGNOSIS — N40.1 BENIGN PROSTATIC HYPERPLASIA WITH LOWER URINARY TRACT SYMPTOMS, SYMPTOM DETAILS UNSPECIFIED: Primary | ICD-10-CM

## 2018-10-01 RX ORDER — TAMSULOSIN HYDROCHLORIDE 0.4 MG/1
0.4 CAPSULE ORAL DAILY
Qty: 30 CAPSULE | Refills: 1 | Status: SHIPPED | OUTPATIENT
Start: 2018-10-01 | End: 2019-09-04

## 2018-10-03 ENCOUNTER — HOSPITAL ENCOUNTER (OUTPATIENT)
Facility: CLINIC | Age: 68
Discharge: HOME | End: 2018-10-03
Attending: COLON & RECTAL SURGERY | Admitting: COLON & RECTAL SURGERY
Payer: MEDICARE

## 2018-10-03 ENCOUNTER — SURGERY (OUTPATIENT)
Age: 68
End: 2018-10-03

## 2018-10-03 VITALS
WEIGHT: 182 LBS | BODY MASS INDEX: 26.05 KG/M2 | HEIGHT: 70 IN | RESPIRATION RATE: 17 BRPM | OXYGEN SATURATION: 95 % | SYSTOLIC BLOOD PRESSURE: 135 MMHG | DIASTOLIC BLOOD PRESSURE: 102 MMHG

## 2018-10-03 LAB — COLONOSCOPY: NORMAL

## 2018-10-03 PROCEDURE — G0121 COLON CA SCRN NOT HI RSK IND: HCPCS | Performed by: COLON & RECTAL SURGERY

## 2018-10-03 PROCEDURE — G0500 MOD SEDAT ENDO SERVICE >5YRS: HCPCS | Performed by: COLON & RECTAL SURGERY

## 2018-10-03 PROCEDURE — 45378 DIAGNOSTIC COLONOSCOPY: CPT | Performed by: COLON & RECTAL SURGERY

## 2018-10-03 PROCEDURE — 25000128 H RX IP 250 OP 636: Performed by: COLON & RECTAL SURGERY

## 2018-10-03 RX ORDER — LIDOCAINE 40 MG/G
CREAM TOPICAL
Status: DISCONTINUED | OUTPATIENT
Start: 2018-10-03 | End: 2018-10-03 | Stop reason: HOSPADM

## 2018-10-03 RX ORDER — FENTANYL CITRATE 50 UG/ML
INJECTION, SOLUTION INTRAMUSCULAR; INTRAVENOUS PRN
Status: DISCONTINUED | OUTPATIENT
Start: 2018-10-03 | End: 2018-10-03 | Stop reason: HOSPADM

## 2018-10-03 RX ORDER — ONDANSETRON 2 MG/ML
4 INJECTION INTRAMUSCULAR; INTRAVENOUS
Status: DISCONTINUED | OUTPATIENT
Start: 2018-10-03 | End: 2018-10-03 | Stop reason: HOSPADM

## 2018-10-03 RX ADMIN — MIDAZOLAM 2 MG: 1 INJECTION INTRAMUSCULAR; INTRAVENOUS at 07:38

## 2018-10-03 RX ADMIN — FENTANYL CITRATE 100 MCG: 50 INJECTION, SOLUTION INTRAMUSCULAR; INTRAVENOUS at 07:38

## 2018-10-03 NOTE — H&P
Colon & Rectal Surgery History and Physical  Pre-Endoscopy Procedure Note    History of Present Illness   I have been asked by Dr. Gallardo to evaluate this 68 year old male for colorectal cancer screening. He had a normal screening colonoscopy in April 2008.  S/he denies any abdominal pain, weight loss, bleeding per rectum, or recent change in bowel habits.    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     Infectious mononucleosis 1960       Past Surgical History  Procedure Laterality Date     NONSPECIFIC PROCEDURE  1967    ski accident L leg (torn ligaments)     LAPAROSCOPIC HERNIORRHAPHY INGUINAL BILATERAL Bilateral 12/5/2016    Procedure: LAPAROSCOPIC HERNIORRHAPHY INGUINAL BILATERAL;  Surgeon: Alec Johnson MD;  Location:  OR        Medications  Medication Sig     diclofenac (VOLTAREN) 75 MG EC tablet      losartan (COZAAR) 100 MG tablet Take 1 tablet (100 mg) by mouth daily     sildenafil (VIAGRA) 100 MG tablet Take 1 tablet (100 mg) by mouth daily as needed at least 30 minutes before intercourse.     tamsulosin (FLOMAX) 0.4 MG capsule Take 1 capsule (0.4 mg) by mouth daily       Allergies  Allergen Reactions     Cialis [Tadalafil]      Lisinopril      cough     Thiazide-Type Diuretics      PRINZIDE        Family History   Family history includes C.A.D. in his father; Hypertension in his brother, father, and mother.     Social History   He reports that he has never smoked. He has never used smokeless tobacco. He reports that he drinks alcohol. He reports that he does not use illicit drugs.    Review of Systems   Constitutional:  No fever, weight change or fatigue.    Eyes:     No dry eyes or vision changes.   Ears/Nose/Throat/Neck:  No oral ulcers, sore throat or voice change.    Cardiovascular:   No palpitations,  "syncope, angina or edema.   Respiratory:    No chest pain, excessive sleepiness, shortness of breath or hemoptysis.    Gastrointestinal:   No abdominal pain, nausea, vomiting, diarrhea or heartburn.    Genitourinary:   No dysuria, hematuria, urinary retention or urinary frequency.   Musculoskeletal:  No joint swelling or arthralgias.    Dermatologic:  No skin rash or other skin changes.   Neurologic:    No focal weakness or numbness. No neuropathy.   Psychiatric:    No depression, anxiety, suicidal ideation, or paranoid ideation.   Endocrine:   No cold or heat intolerance, polydipsia, hirsutism, change in libido, or flushing.   Hematology/Lymphatic:  No bleeding or lymphadenopathy.    Allergy/Immunology:  No rhinitis or hives.     Physical Exam   Vitals:  BP (!) 141/94, RR 16, HR 64, height 1.778 m (5' 10\"), weight 82.6 kg (182 lb), SpO2 96 %.    General:  Alert and oriented to person, place and time   Airway: Normal oropharyngeal airway and neck mobility   Lungs:  Clear bilaterally   Heart:  Regular rate and rhythm   Abdomen: Soft, NT, ND, no masses   Rectal:  Perianal skin without excoriation, hemorrhoidal disease or anal fissure        Digital rectal examination reveals normal sphincter tone without masses    ASA Grade: II (mild systemic disease)    Impression: Cleared for use of conscious sedation for colorectal cancer screening    Plan: Proceed with colonoscopy     Lia Cosme MD  [facility]  [phone]  "

## 2018-11-21 ENCOUNTER — APPOINTMENT (OUTPATIENT)
Age: 68
Setting detail: DERMATOLOGY
End: 2018-11-23

## 2018-11-21 DIAGNOSIS — L82.0 INFLAMED SEBORRHEIC KERATOSIS: ICD-10-CM

## 2018-11-21 DIAGNOSIS — L72.8 OTHER FOLLICULAR CYSTS OF THE SKIN AND SUBCUTANEOUS TISSUE: ICD-10-CM

## 2018-11-21 PROCEDURE — 17110 DESTRUCT B9 LESION 1-14: CPT

## 2018-11-21 PROCEDURE — OTHER COUNSELING: OTHER

## 2018-11-21 PROCEDURE — OTHER DEFER: OTHER

## 2018-11-21 PROCEDURE — OTHER MIPS QUALITY: OTHER

## 2018-11-21 PROCEDURE — 99213 OFFICE O/P EST LOW 20 MIN: CPT | Mod: 25

## 2018-11-21 PROCEDURE — OTHER LIQUID NITROGEN: OTHER

## 2018-11-21 ASSESSMENT — LOCATION DETAILED DESCRIPTION DERM
LOCATION DETAILED: LEFT CENTRAL LATERAL NECK
LOCATION DETAILED: LEFT VENTRAL PROXIMAL FOREARM
LOCATION DETAILED: RIGHT CENTRAL LATERAL NECK

## 2018-11-21 ASSESSMENT — LOCATION ZONE DERM
LOCATION ZONE: NECK
LOCATION ZONE: ARM

## 2018-11-21 ASSESSMENT — LOCATION SIMPLE DESCRIPTION DERM
LOCATION SIMPLE: LEFT FOREARM
LOCATION SIMPLE: NECK

## 2018-11-21 NOTE — PROCEDURE: LIQUID NITROGEN
Duration Of Freeze Thaw-Cycle (Seconds): 15
Number Of Freeze-Thaw Cycles: 1 freeze-thaw cycle
Detail Level: Simple
Include Z78.9 (Other Specified Conditions Influencing Health Status) As An Associated Diagnosis?: No
Medical Necessity Clause: This procedure was medically necessary because the lesions that were treated were:
Post-Care Instructions: I reviewed with the patient in detail post-care instructions. Patient is to wear sunprotection, and avoid picking at any of the treated lesions. Pt may apply Vaseline to crusted or scabbing areas.
Render Post Care In The Note?: yes
Consent: The patient's verbal consent was obtained including but not limited to risks of crusting, scabbing, blistering, scarring, darker or lighter pigmentary change, recurrence, incomplete removal and infection.
Total Number Of Lesions Treated: 1
Medical Necessity Information: It is in your best interest to select a reason for this procedure from the list below. All of these items fulfill various CMS LCD requirements except the new and changing color options.

## 2018-11-21 NOTE — PROCEDURE: MIPS QUALITY
Quality 431: Preventive Care And Screening: Unhealthy Alcohol Use - Screening: Patient screened for unhealthy alcohol use using a single question and scores less than 2 times per year
Detail Level: Detailed
Quality 130: Documentation Of Current Medications In The Medical Record: Current Medications Documented
Quality 226: Preventive Care And Screening: Tobacco Use: Screening And Cessation Intervention: Patient screened for tobacco and never smoked
Quality 131: Pain Assessment And Follow-Up: Pain assessment using a standardized tool is documented as negative, no follow-up plan required
Quality 110: Preventive Care And Screening: Influenza Immunization: Influenza Immunization previously received during influenza season

## 2018-11-21 NOTE — PROCEDURE: DEFER
Procedure To Be Performed At Next Visit: Excision
Detail Level: Simple
Instructions (Optional): Patient states the cyst located on the left lateral neck is itchy and occasionally painful; the cyst located on the right lateral neck is asymptomatic at this time.

## 2018-12-12 ENCOUNTER — MYC MEDICAL ADVICE (OUTPATIENT)
Dept: INTERNAL MEDICINE | Facility: CLINIC | Age: 68
End: 2018-12-12

## 2018-12-12 ENCOUNTER — TELEPHONE (OUTPATIENT)
Dept: INTERNAL MEDICINE | Facility: CLINIC | Age: 68
End: 2018-12-12

## 2018-12-12 NOTE — TELEPHONE ENCOUNTER
Reason for Call:  Other FYI    Detailed comments: Pt is requesting a change in pharmacy starting 1/1/19 to [facility] in [place] on [address] Please change in pt's chart.  Phone Number Patient can be reached at: Home number on file [phone] (home)    Best Time: anytime    Can we leave a detailed message on this number? YES    Call taken on 12/12/2018 at 2:01 PM by ELOINA RENEE

## 2018-12-21 ENCOUNTER — OFFICE VISIT (OUTPATIENT)
Dept: INTERNAL MEDICINE | Facility: CLINIC | Age: 68
End: 2018-12-21
Payer: COMMERCIAL

## 2018-12-21 VITALS
DIASTOLIC BLOOD PRESSURE: 85 MMHG | OXYGEN SATURATION: 97 % | BODY MASS INDEX: 26.16 KG/M2 | HEART RATE: 80 BPM | TEMPERATURE: 97.9 F | SYSTOLIC BLOOD PRESSURE: 140 MMHG | WEIGHT: 182.3 LBS | RESPIRATION RATE: 16 BRPM

## 2018-12-21 DIAGNOSIS — F41.1 GAD (GENERALIZED ANXIETY DISORDER): Primary | ICD-10-CM

## 2018-12-21 LAB
ANION GAP SERPL CALCULATED.3IONS-SCNC: 7 MMOL/L (ref 3–14)
BUN SERPL-MCNC: 15 MG/DL (ref 7–30)
CALCIUM SERPL-MCNC: 9 MG/DL (ref 8.5–10.1)
CHLORIDE SERPL-SCNC: 108 MMOL/L (ref 94–109)
CO2 SERPL-SCNC: 26 MMOL/L (ref 20–32)
CREAT SERPL-MCNC: 0.83 MG/DL (ref 0.66–1.25)
GFR SERPL CREATININE-BSD FRML MDRD: 89 ML/MIN/{1.73_M2}
GLUCOSE SERPL-MCNC: 96 MG/DL (ref 70–99)
POTASSIUM SERPL-SCNC: 3.8 MMOL/L (ref 3.4–5.3)
SODIUM SERPL-SCNC: 141 MMOL/L (ref 133–144)
TSH SERPL DL<=0.005 MIU/L-ACNC: 1.66 MU/L (ref 0.4–4)

## 2018-12-21 PROCEDURE — 36415 COLL VENOUS BLD VENIPUNCTURE: CPT | Performed by: INTERNAL MEDICINE

## 2018-12-21 PROCEDURE — 84443 ASSAY THYROID STIM HORMONE: CPT | Performed by: INTERNAL MEDICINE

## 2018-12-21 PROCEDURE — 99214 OFFICE O/P EST MOD 30 MIN: CPT | Performed by: INTERNAL MEDICINE

## 2018-12-21 PROCEDURE — 80048 BASIC METABOLIC PNL TOTAL CA: CPT | Performed by: INTERNAL MEDICINE

## 2018-12-21 RX ORDER — SULFADIAZINE 500 MG/1
500 TABLET ORAL 4 TIMES DAILY
COMMUNITY
End: 2019-09-04

## 2018-12-21 RX ORDER — FOLIC ACID 1 MG/1
1 TABLET ORAL DAILY
COMMUNITY
End: 2019-12-02

## 2018-12-21 ASSESSMENT — ANXIETY QUESTIONNAIRES
7. FEELING AFRAID AS IF SOMETHING AWFUL MIGHT HAPPEN: MORE THAN HALF THE DAYS
5. BEING SO RESTLESS THAT IT IS HARD TO SIT STILL: MORE THAN HALF THE DAYS
1. FEELING NERVOUS, ANXIOUS, OR ON EDGE: MORE THAN HALF THE DAYS
3. WORRYING TOO MUCH ABOUT DIFFERENT THINGS: NEARLY EVERY DAY
IF YOU CHECKED OFF ANY PROBLEMS ON THIS QUESTIONNAIRE, HOW DIFFICULT HAVE THESE PROBLEMS MADE IT FOR YOU TO DO YOUR WORK, TAKE CARE OF THINGS AT HOME, OR GET ALONG WITH OTHER PEOPLE: SOMEWHAT DIFFICULT
2. NOT BEING ABLE TO STOP OR CONTROL WORRYING: NEARLY EVERY DAY
GAD7 TOTAL SCORE: 15
6. BECOMING EASILY ANNOYED OR IRRITABLE: SEVERAL DAYS

## 2018-12-21 ASSESSMENT — PATIENT HEALTH QUESTIONNAIRE - PHQ9
SUM OF ALL RESPONSES TO PHQ QUESTIONS 1-9: 10
5. POOR APPETITE OR OVEREATING: MORE THAN HALF THE DAYS

## 2018-12-21 NOTE — PATIENT INSTRUCTIONS
- Start taking sertraline (Zoloft) once daily.  (Prescription has been sent to your pharmacy)    - [facility] will contact you to schedule your talk therapy.  Please contact us if you do not hear from them.     - Please follow-up with Dr. Gallardo in clinic in 1 month.

## 2018-12-21 NOTE — PROGRESS NOTES
SUBJECTIVE:   Saji Morrison is a 68 year old male who presents to clinic today for the following health issues:      Abnormal Mood Symptoms      Duration: x3 month    Description:  Depression: YES  Anxiety: YES  Panic attacks: YES     Accompanying signs and symptoms: see PHQ-9 and ANJU scores    History (similar episodes/previous evaluation): None    Precipitating or alleviating factors: None    Therapies tried and outcome: none          Problem list and histories reviewed & adjusted, as indicated.  Additional history: as documented    Team appointment, patient usually sees Dr. Gallardo.    Patient has been dealing with increased depressive and especially anxiety symptoms over the last few months.  He has a number of stressors currently - he has recently been diagnosed with ankylosing spondylitis and the anti-inflammatories tried thus far have been relatively ineffective.  He has not been able to play golf like usual, which has affected his mood.  He is in the midst of some financial difficulties.  He recently ended a relationship.    All of these have resulted in significant depressive and anxiety symptoms, today's PHQ 9 and ANJU 7 noted.  His sister has had similar problems and recommended that he be seen to be considered for medication.  He would be agreeable to medication if appropriate.  He has not sought any talk therapy.    Reviewed and updated as needed this visit by clinical staff  Tobacco  Allergies  Meds  Problems  Med Hx  Surg Hx  Fam Hx       Reviewed and updated as needed this visit by Provider  Tobacco  Allergies  Meds  Problems  Med Hx  Surg Hx  Fam Hx         ROS:  Constitutional, HEENT, cardiovascular, pulmonary, GI, , musculoskeletal, neuro, skin, endocrine and psych systems are negative, except as otherwise noted.    OBJECTIVE:     /85   Pulse 80   Temp 97.9  F (36.6  C) (Oral)   Resp 16   Wt 82.7 kg (182 lb 4.8 oz)   SpO2 97%   BMI 26.16 kg/m    Body mass index  is 26.16 kg/m .  GENERAL: healthy, alert and no distress  NECK: no adenopathy, no asymmetry, masses, or scars and thyroid normal to palpation  RESP: lungs clear to auscultation - no rales, rhonchi or wheezes  CV: regular rate and rhythm, normal S1 S2, no S3 or S4, no murmur, click or rub, no peripheral edema and peripheral pulses strong  ABDOMEN: soft, nontender, no hepatosplenomegaly, no masses and bowel sounds normal  MS: no gross musculoskeletal defects noted, no edema        ASSESSMENT/PLAN:     1. ANJU (generalized anxiety disorder)  We will check labs as ordered, including thyroid function.  Just had hemoglobin checked recently at outside facility, was normal.  Discussed treatment options, will try sertraline once daily, needs to follow-up with PCP in 1 month for reassessment.  Also recommended talk therapy through [facility], referral placed.  - Basic metabolic panel  (Ca, Cl, CO2, Creat, Gluc, K, Na, BUN)  - TSH with free T4 reflex  - sertraline (ZOLOFT) 50 MG tablet; Take 1 tablet (50 mg) by mouth daily  Dispense: 30 tablet; Refill: 3  - MENTAL HEALTH REFERRAL  - Adult; Outpatient Treatment; Individual/Couples/Family/Group Therapy/Health Psychology; Other: [facility] [phone]; We will contact you to schedule the appointment or please call with any questi...    See Patient Instructions    Javan Castillo MD  [facility]

## 2018-12-22 ASSESSMENT — ANXIETY QUESTIONNAIRES: GAD7 TOTAL SCORE: 15

## 2018-12-26 ENCOUNTER — MYC MEDICAL ADVICE (OUTPATIENT)
Dept: INTERNAL MEDICINE | Facility: CLINIC | Age: 68
End: 2018-12-26

## 2019-01-02 ENCOUNTER — MYC MEDICAL ADVICE (OUTPATIENT)
Dept: INTERNAL MEDICINE | Facility: CLINIC | Age: 69
End: 2019-01-02

## 2019-01-16 ENCOUNTER — APPOINTMENT (OUTPATIENT)
Age: 69
Setting detail: DERMATOLOGY
End: 2019-01-16

## 2019-01-16 DIAGNOSIS — L72.8 OTHER FOLLICULAR CYSTS OF THE SKIN AND SUBCUTANEOUS TISSUE: ICD-10-CM

## 2019-01-16 PROCEDURE — OTHER MIPS QUALITY: OTHER

## 2019-01-16 PROCEDURE — OTHER EXCISION: OTHER

## 2019-01-16 PROCEDURE — OTHER COUNSELING: OTHER

## 2019-01-16 PROCEDURE — 14040 TIS TRNFR F/C/C/M/N/A/G/H/F: CPT

## 2019-01-16 ASSESSMENT — LOCATION ZONE DERM: LOCATION ZONE: NECK

## 2019-01-16 ASSESSMENT — LOCATION SIMPLE DESCRIPTION DERM: LOCATION SIMPLE: NECK

## 2019-01-16 ASSESSMENT — LOCATION DETAILED DESCRIPTION DERM: LOCATION DETAILED: LEFT SUPERIOR LATERAL NECK

## 2019-01-16 NOTE — PROCEDURE: MIPS QUALITY
Quality 226: Preventive Care And Screening: Tobacco Use: Screening And Cessation Intervention: Patient screened for tobacco and never smoked
Detail Level: Detailed
Quality 110: Preventive Care And Screening: Influenza Immunization: Influenza Immunization previously received during influenza season
Quality 431: Preventive Care And Screening: Unhealthy Alcohol Use - Screening: Patient screened for unhealthy alcohol use using a single question and scores less than 2 times per year
Quality 130: Documentation Of Current Medications In The Medical Record: Current Medications Documented
Quality 131: Pain Assessment And Follow-Up: Pain assessment using a standardized tool is documented as negative, no follow-up plan required

## 2019-01-16 NOTE — PROCEDURE: EXCISION
Bill For Surgical Tray: no
Trilobed Flap Text: The defect edges were debeveled with a #15 scalpel blade.  Given the location of the defect and the proximity to free margins a trilobed flap was deemed most appropriate.  Using a sterile surgical marker, an appropriate trilobed flap drawn around the defect.    The area thus outlined was incised deep to adipose tissue with a #15 scalpel blade.  The skin margins were undermined to an appropriate distance in all directions utilizing iris scissors.
Anesthesia Type: 1% lidocaine with epinephrine
Excisional Biopsy Additional Text (Leave Blank If You Do Not Want): The margin was drawn around the clinically apparent lesion.  An elliptical shape was then drawn on the skin incorporating the lesion and margins.  Incisions were then made along these lines to the appropriate tissue plane and the lesion was extirpated.
Complex Repair And Xenograft Text: The defect edges were debeveled with a #15 scalpel blade.  The primary defect was closed partially with a complex linear closure.  Given the location of the defect, shape of the defect and the proximity to free margins an tissue cultured epidermal autograft was deemed most appropriate to repair the remaining defect.  The graft was trimmed to fit the size of the remaining defect.  The graft was then placed in the primary defect, oriented appropriately, and sutured into place.
Show Asc Variables: Yes
Helical Rim Advancement Flap Text: The defect edges were debeveled with a #15 blade scalpel.  Given the location of the defect and the proximity to free margins (helical rim) a double helical rim advancement flap was deemed most appropriate.  Using a sterile surgical marker, the appropriate advancement flaps were drawn incorporating the defect and placing the expected incisions between the helical rim and antihelix where possible.  The area thus outlined was incised through and through with a #15 scalpel blade.  With a skin hook and iris scissors, the flaps were gently and sharply undermined and freed up.
Double O-Z Plasty Text: The defect edges were debeveled with a #15 scalpel blade.  Given the location of the defect, shape of the defect and the proximity to free margins a Double O-Z plasty (double transposition flap) was deemed most appropriate.  Using a sterile surgical marker, the appropriate transposition flaps were drawn incorporating the defect and placing the expected incisions within the relaxed skin tension lines where possible. The area thus outlined was incised deep to adipose tissue with a #15 scalpel blade.  The skin margins were undermined to an appropriate distance in all directions utilizing iris scissors.  Hemostasis was achieved with electrocautery.  The flaps were then transposed into place, one clockwise and the other counterclockwise, and anchored with interrupted buried subcutaneous sutures.
Flap Type: O-T Advancement Flap
Split-Thickness Skin Graft Text: The defect edges were debeveled with a #15 scalpel blade.  Given the location of the defect, shape of the defect and the proximity to free margins a split thickness skin graft was deemed most appropriate.  Using a sterile surgical marker, the primary defect shape was transferred to the donor site. The split thickness graft was then harvested.  The skin graft was then placed in the primary defect and oriented appropriately.
Muscle Hinge Flap Text: The defect edges were debeveled with a #15 scalpel blade.  Given the size, depth and location of the defect and the proximity to free margins a muscle hinge flap was deemed most appropriate.  Using a sterile surgical marker, an appropriate hinge flap was drawn incorporating the defect. The area thus outlined was incised with a #15 scalpel blade.  The skin margins were undermined to an appropriate distance in all directions utilizing iris scissors.
Hatchet Flap Text: The defect edges were debeveled with a #15 scalpel blade.  Given the location of the defect, shape of the defect and the proximity to free margins a hatchet flap was deemed most appropriate.  Using a sterile surgical marker, an appropriate hatchet flap was drawn incorporating the defect and placing the expected incisions within the relaxed skin tension lines where possible.    The area thus outlined was incised deep to adipose tissue with a #15 scalpel blade.  The skin margins were undermined to an appropriate distance in all directions utilizing iris scissors.
Double Island Pedicle Flap Text: The defect edges were debeveled with a #15 scalpel blade.  Given the location of the defect, shape of the defect and the proximity to free margins a double island pedicle advancement flap was deemed most appropriate.  Using a sterile surgical marker, an appropriate advancement flap was drawn incorporating the defect, outlining the appropriate donor tissue and placing the expected incisions within the relaxed skin tension lines where possible.    The area thus outlined was incised deep to adipose tissue with a #15 scalpel blade.  The skin margins were undermined to an appropriate distance in all directions around the primary defect and laterally outward around the island pedicle utilizing iris scissors.  There was minimal undermining beneath the pedicle flap.
Hemostasis: Electrocautery
Banner Transposition Flap Text: The defect edges were debeveled with a #15 scalpel blade.  Given the location of the defect and the proximity to free margins a Banner transposition flap was deemed most appropriate.  Using a sterile surgical marker, an appropriate flap drawn around the defect. The area thus outlined was incised deep to adipose tissue with a #15 scalpel blade.  The skin margins were undermined to an appropriate distance in all directions utilizing iris scissors.
Post-Care Instructions: I reviewed with the patient in detail post-care instructions. Patient is not to engage in any heavy lifting, exercise, or swimming for the next 14 days. Should the patient develop any fevers, chills, bleeding, severe pain patient will contact the office immediately.
O-L Flap Text: The defect edges were debeveled with a #15 scalpel blade.  Given the location of the defect, shape of the defect and the proximity to free margins an O-L flap was deemed most appropriate.  Using a sterile surgical marker, an appropriate advancement flap was drawn incorporating the defect and placing the expected incisions within the relaxed skin tension lines where possible.    The area thus outlined was incised deep to adipose tissue with a #15 scalpel blade.  The skin margins were undermined to an appropriate distance in all directions utilizing iris scissors.
O-T Plasty Text: The defect edges were debeveled with a #15 scalpel blade.  Given the location of the defect, shape of the defect and the proximity to free margins an O-T plasty was deemed most appropriate.  Using a sterile surgical marker, an appropriate O-T plasty was drawn incorporating the defect and placing the expected incisions within the relaxed skin tension lines where possible.    The area thus outlined was incised deep to adipose tissue with a #15 scalpel blade.  The skin margins were undermined to an appropriate distance in all directions utilizing iris scissors.
Repair Type: Flap
Composite Graft Text: The defect edges were debeveled with a #15 scalpel blade.  Given the location of the defect, shape of the defect, the proximity to free margins and the fact the defect was full thickness a composite graft was deemed most appropriate.  The defect was outline and then transferred to the donor site.  A full thickness graft was then excised from the donor site. The graft was then placed in the primary defect, oriented appropriately and then sutured into place.  The secondary defect was then repaired using a primary closure.
Skin Substitute Units (Will Override Primary Defect Units If Greater Than 0): 0
Mercedes Flap Text: The defect edges were debeveled with a #15 scalpel blade.  Given the location of the defect, shape of the defect and the proximity to free margins a Mercedes flap was deemed most appropriate.  Using a sterile surgical marker, an appropriate advancement flap was drawn incorporating the defect and placing the expected incisions within the relaxed skin tension lines where possible. The area thus outlined was incised deep to adipose tissue with a #15 scalpel blade.  The skin margins were undermined to an appropriate distance in all directions utilizing iris scissors.
Secondary Defect Width (In Cm): 1.5
Complex Repair And Melolabial Flap Text: The defect edges were debeveled with a #15 scalpel blade.  The primary defect was closed partially with a complex linear closure.  Given the location of the remaining defect, shape of the defect and the proximity to free margins a melolabial flap was deemed most appropriate for complete closure of the defect.  Using a sterile surgical marker, an appropriate advancement flap was drawn incorporating the defect and placing the expected incisions within the relaxed skin tension lines where possible.    The area thus outlined was incised deep to adipose tissue with a #15 scalpel blade.  The skin margins were undermined to an appropriate distance in all directions utilizing iris scissors.
Epidermal Autograft Text: The defect edges were debeveled with a #15 scalpel blade.  Given the location of the defect, shape of the defect and the proximity to free margins an epidermal autograft was deemed most appropriate.  Using a sterile surgical marker, the primary defect shape was transferred to the donor site. The epidermal graft was then harvested.  The skin graft was then placed in the primary defect and oriented appropriately.
Estimated Blood Loss (Cc): minimal
Dressing: pressure dressing with telfa
M-Plasty Intermediate Repair Preamble Text (Leave Blank If You Do Not Want): Undermining was performed with blunt dissection.
Island Pedicle Flap-Requiring Vessel Identification Text: The defect edges were debeveled with a #15 scalpel blade.  Given the location of the defect, shape of the defect and the proximity to free margins an island pedicle advancement flap was deemed most appropriate.  Using a sterile surgical marker, an appropriate advancement flap was drawn, based on the axial vessel mentioned above, incorporating the defect, outlining the appropriate donor tissue and placing the expected incisions within the relaxed skin tension lines where possible.    The area thus outlined was incised deep to adipose tissue with a #15 scalpel blade.  The skin margins were undermined to an appropriate distance in all directions around the primary defect and laterally outward around the island pedicle utilizing iris scissors.  There was minimal undermining beneath the pedicle flap.
Complex Repair And Double Advancement Flap Text: The defect edges were debeveled with a #15 scalpel blade.  The primary defect was closed partially with a complex linear closure.  Given the location of the remaining defect, shape of the defect and the proximity to free margins a double advancement flap was deemed most appropriate for complete closure of the defect.  Using a sterile surgical marker, an appropriate advancement flap was drawn incorporating the defect and placing the expected incisions within the relaxed skin tension lines where possible.    The area thus outlined was incised deep to adipose tissue with a #15 scalpel blade.  The skin margins were undermined to an appropriate distance in all directions utilizing iris scissors.
Perilesional Excision Additional Text (Leave Blank If You Do Not Want): The margin was drawn around the clinically apparent lesion. Incisions were then made along these lines to the appropriate tissue plane and the lesion was extirpated.
Saucerization Excision Additional Text (Leave Blank If You Do Not Want): The margin was drawn around the clinically apparent lesion.  Incisions were then made along these lines, in a tangential fashion, to the appropriate tissue plane and the lesion was extirpated.
Advancement Flap (Double) Text: The defect edges were debeveled with a #15 scalpel blade.  Given the location of the defect and the proximity to free margins a double advancement flap was deemed most appropriate.  Using a sterile surgical marker, the appropriate advancement flaps were drawn incorporating the defect and placing the expected incisions within the relaxed skin tension lines where possible.    The area thus outlined was incised deep to adipose tissue with a #15 scalpel blade.  The skin margins were undermined to an appropriate distance in all directions utilizing iris scissors.
Cheek-To-Nose Interpolation Flap Text: A decision was made to reconstruct the defect utilizing an interpolation axial flap and a staged reconstruction.  A telfa template was made of the defect.  This telfa template was then used to outline the Cheek-To-Nose Interpolation flap.  The donor area for the pedicle flap was then injected with anesthesia.  The flap was excised through the skin and subcutaneous tissue down to the layer of the underlying musculature.  The interpolation flap was carefully excised within this deep plane to maintain its blood supply.  The edges of the donor site were undermined.   The donor site was closed in a primary fashion.  The pedicle was then rotated into position and sutured.  Once the tube was sutured into place, adequate blood supply was confirmed with blanching and refill.  The pedicle was then wrapped with xeroform gauze and dressed appropriately with a telfa and gauze bandage to ensure continued blood supply and protect the attached pedicle.
Melolabial Interpolation Flap Text: A decision was made to reconstruct the defect utilizing an interpolation axial flap and a staged reconstruction.  A telfa template was made of the defect.  This telfa template was then used to outline the melolabial interpolation flap.  The donor area for the pedicle flap was then injected with anesthesia.  The flap was excised through the skin and subcutaneous tissue down to the layer of the underlying musculature.  The pedicle flap was carefully excised within this deep plane to maintain its blood supply.  The edges of the donor site were undermined.   The donor site was closed in a primary fashion.  The pedicle was then rotated into position and sutured.  Once the tube was sutured into place, adequate blood supply was confirmed with blanching and refill.  The pedicle was then wrapped with xeroform gauze and dressed appropriately with a telfa and gauze bandage to ensure continued blood supply and protect the attached pedicle.
Medical Necessity Information: It is in your best interest to select a reason for this procedure from the list below. All of these items fulfill various CMS LCD requirements except lesion extends to a margin.
A-T Advancement Flap Text: The defect edges were debeveled with a #15 scalpel blade.  Given the location of the defect, shape of the defect and the proximity to free margins an A-T advancement flap was deemed most appropriate.  Using a sterile surgical marker, an appropriate advancement flap was drawn incorporating the defect and placing the expected incisions within the relaxed skin tension lines where possible.    The area thus outlined was incised deep to adipose tissue with a #15 scalpel blade.  The skin margins were undermined to an appropriate distance in all directions utilizing iris scissors.
Complex Repair And M Plasty Text: The defect edges were debeveled with a #15 scalpel blade.  The primary defect was closed partially with a complex linear closure.  Given the location of the remaining defect, shape of the defect and the proximity to free margins an M plasty was deemed most appropriate for complete closure of the defect.  Using a sterile surgical marker, an appropriate advancement flap was drawn incorporating the defect and placing the expected incisions within the relaxed skin tension lines where possible.    The area thus outlined was incised deep to adipose tissue with a #15 scalpel blade.  The skin margins were undermined to an appropriate distance in all directions utilizing iris scissors.
Ear Star Wedge Flap Text: The defect edges were debeveled with a #15 blade scalpel.  Given the location of the defect and the proximity to free margins (helical rim) an ear star wedge flap was deemed most appropriate.  Using a sterile surgical marker, the appropriate flap was drawn incorporating the defect and placing the expected incisions between the helical rim and antihelix where possible.  The area thus outlined was incised through and through with a #15 scalpel blade.
Deep Sutures: 4-0 Monocryl
Complex Repair And Modified Advancement Flap Text: The defect edges were debeveled with a #15 scalpel blade.  The primary defect was closed partially with a complex linear closure.  Given the location of the remaining defect, shape of the defect and the proximity to free margins a modified advancement flap was deemed most appropriate for complete closure of the defect.  Using a sterile surgical marker, an appropriate advancement flap was drawn incorporating the defect and placing the expected incisions within the relaxed skin tension lines where possible.    The area thus outlined was incised deep to adipose tissue with a #15 scalpel blade.  The skin margins were undermined to an appropriate distance in all directions utilizing iris scissors.
Path Notes (To The Dermatopathologist): Please Check margins
Purse String (Simple) Text: Given the location of the defect and the characteristics of the surrounding skin a purse string simple closure was deemed most appropriate.  Undermining was performed circumferentially around the surgical defect.  A purse string suture was then placed and tightened.
Star Wedge Flap Text: The defect edges were debeveled with a #15 scalpel blade.  Given the location of the defect, shape of the defect and the proximity to free margins a star wedge flap was deemed most appropriate.  Using a sterile surgical marker, an appropriate rotation flap was drawn incorporating the defect and placing the expected incisions within the relaxed skin tension lines where possible. The area thus outlined was incised deep to adipose tissue with a #15 scalpel blade.  The skin margins were undermined to an appropriate distance in all directions utilizing iris scissors.
V-Y Flap Text: The defect edges were debeveled with a #15 scalpel blade.  Given the location of the defect, shape of the defect and the proximity to free margins a V-Y flap was deemed most appropriate.  Using a sterile surgical marker, an appropriate advancement flap was drawn incorporating the defect and placing the expected incisions within the relaxed skin tension lines where possible.    The area thus outlined was incised deep to adipose tissue with a #15 scalpel blade.  The skin margins were undermined to an appropriate distance in all directions utilizing iris scissors.
Advancement Flap (Single) Text: The defect edges were debeveled with a #15 scalpel blade.  Given the location of the defect and the proximity to free margins a single advancement flap was deemed most appropriate.  Using a sterile surgical marker, an appropriate advancement flap was drawn incorporating the defect and placing the expected incisions within the relaxed skin tension lines where possible.    The area thus outlined was incised deep to adipose tissue with a #15 scalpel blade.  The skin margins were undermined to an appropriate distance in all directions utilizing iris scissors.
M-Plasty Complex Repair Preamble Text (Leave Blank If You Do Not Want): Extensive wide undermining was performed.
Intermediate / Complex Repair - Final Wound Length In Cm: 3
Complex Repair And Z Plasty Text: The defect edges were debeveled with a #15 scalpel blade.  The primary defect was closed partially with a complex linear closure.  Given the location of the remaining defect, shape of the defect and the proximity to free margins a Z plasty was deemed most appropriate for complete closure of the defect.  Using a sterile surgical marker, an appropriate advancement flap was drawn incorporating the defect and placing the expected incisions within the relaxed skin tension lines where possible.    The area thus outlined was incised deep to adipose tissue with a #15 scalpel blade.  The skin margins were undermined to an appropriate distance in all directions utilizing iris scissors.
X Size Of Lesion In Cm (Optional): 1.4
V-Y Plasty Text: The defect edges were debeveled with a #15 scalpel blade.  Given the location of the defect, shape of the defect and the proximity to free margins an V-Y advancement flap was deemed most appropriate.  Using a sterile surgical marker, an appropriate advancement flap was drawn incorporating the defect and placing the expected incisions within the relaxed skin tension lines where possible.    The area thus outlined was incised deep to adipose tissue with a #15 scalpel blade.  The skin margins were undermined to an appropriate distance in all directions utilizing iris scissors.
O-T Advancement Flap Text: The defect edges were debeveled with a #15 scalpel blade.  Given the location of the defect, shape of the defect and the proximity to free margins an O-T advancement flap was deemed most appropriate.  Using a sterile surgical marker, an appropriate advancement flap was drawn incorporating the defect and placing the expected incisions within the relaxed skin tension lines where possible.    The area thus outlined was incised deep to adipose tissue with a #15 scalpel blade.  The skin margins were undermined to an appropriate distance in all directions utilizing iris scissors.
Excision Depth: adipose tissue
Wound Care: Vaseline
Xenograft Text: The defect edges were debeveled with a #15 scalpel blade.  Given the location of the defect, shape of the defect and the proximity to free margins a xenograft was deemed most appropriate.  The graft was then trimmed to fit the size of the defect.  The graft was then placed in the primary defect and oriented appropriately.
Transposition Flap Text: The defect edges were debeveled with a #15 scalpel blade.  Given the location of the defect and the proximity to free margins a transposition flap was deemed most appropriate.  Using a sterile surgical marker, an appropriate transposition flap was drawn incorporating the defect.    The area thus outlined was incised deep to adipose tissue with a #15 scalpel blade.  The skin margins were undermined to an appropriate distance in all directions utilizing iris scissors.
Mastoid Interpolation Flap Text: A decision was made to reconstruct the defect utilizing an interpolation axial flap and a staged reconstruction.  A telfa template was made of the defect.  This telfa template was then used to outline the mastoid interpolation flap.  The donor area for the pedicle flap was then injected with anesthesia.  The flap was excised through the skin and subcutaneous tissue down to the layer of the underlying musculature.  The pedicle flap was carefully excised within this deep plane to maintain its blood supply.  The edges of the donor site were undermined.   The donor site was closed in a primary fashion.  The pedicle was then rotated into position and sutured.  Once the tube was sutured into place, adequate blood supply was confirmed with blanching and refill.  The pedicle was then wrapped with xeroform gauze and dressed appropriately with a telfa and gauze bandage to ensure continued blood supply and protect the attached pedicle.
Modified Advancement Flap Text: The defect edges were debeveled with a #15 scalpel blade.  Given the location of the defect, shape of the defect and the proximity to free margins a modified advancement flap was deemed most appropriate.  Using a sterile surgical marker, an appropriate advancement flap was drawn incorporating the defect and placing the expected incisions within the relaxed skin tension lines where possible.    The area thus outlined was incised deep to adipose tissue with a #15 scalpel blade.  The skin margins were undermined to an appropriate distance in all directions utilizing iris scissors.
Rotation Flap Text: The defect edges were debeveled with a #15 scalpel blade.  Given the location of the defect, shape of the defect and the proximity to free margins a rotation flap was deemed most appropriate.  Using a sterile surgical marker, an appropriate rotation flap was drawn incorporating the defect and placing the expected incisions within the relaxed skin tension lines where possible.    The area thus outlined was incised deep to adipose tissue with a #15 scalpel blade.  The skin margins were undermined to an appropriate distance in all directions utilizing iris scissors.
Consent was obtained from the patient. The risks and benefits to therapy were discussed in detail. Specifically, the risks of infection, scarring, bleeding, prolonged wound healing, incomplete removal, allergy to anesthesia, nerve injury and recurrence were addressed. Prior to the procedure, the treatment site was clearly identified and confirmed by the patient. All components of Universal Protocol/PAUSE Rule completed.
Billing Type: Third-Party Bill
Fusiform Excision Additional Text (Leave Blank If You Do Not Want): The margin was drawn around the clinically apparent lesion.  A fusiform shape was then drawn on the skin incorporating the lesion and margins.  Incisions were then made along these lines to the appropriate tissue plane and the lesion was extirpated.
Information: Selecting Yes will display possible errors in your note based on the variables you have selected. This validation is only offered as a suggestion for you. PLEASE NOTE THAT THE VALIDATION TEXT WILL BE REMOVED WHEN YOU FINALIZE YOUR NOTE. IF YOU WANT TO FAX A PRELIMINARY NOTE YOU WILL NEED TO TOGGLE THIS TO 'NO' IF YOU DO NOT WANT IT IN YOUR FAXED NOTE.
Detail Level: Detailed
Excision Method: Round
Melolabial Transposition Flap Text: The defect edges were debeveled with a #15 scalpel blade.  Given the location of the defect and the proximity to free margins a melolabial flap was deemed most appropriate.  Using a sterile surgical marker, an appropriate melolabial transposition flap was drawn incorporating the defect.    The area thus outlined was incised deep to adipose tissue with a #15 scalpel blade.  The skin margins were undermined to an appropriate distance in all directions utilizing iris scissors.
Bilobed Transposition Flap Text: The defect edges were debeveled with a #15 scalpel blade.  Given the location of the defect and the proximity to free margins a bilobed transposition flap was deemed most appropriate.  Using a sterile surgical marker, an appropriate bilobe flap drawn around the defect.    The area thus outlined was incised deep to adipose tissue with a #15 scalpel blade.  The skin margins were undermined to an appropriate distance in all directions utilizing iris scissors.
Complex Repair And Rotation Flap Text: The defect edges were debeveled with a #15 scalpel blade.  The primary defect was closed partially with a complex linear closure.  Given the location of the remaining defect, shape of the defect and the proximity to free margins a rotation flap was deemed most appropriate for complete closure of the defect.  Using a sterile surgical marker, an appropriate advancement flap was drawn incorporating the defect and placing the expected incisions within the relaxed skin tension lines where possible.    The area thus outlined was incised deep to adipose tissue with a #15 scalpel blade.  The skin margins were undermined to an appropriate distance in all directions utilizing iris scissors.
Purse String (Intermediate) Text: Given the location of the defect and the characteristics of the surrounding skin a pursestring intermediate closure was deemed most appropriate.  Undermining was performed circumfirentially around the surgical defect.  A purstring suture was then placed and tightened.
Spiral Flap Text: The defect edges were debeveled with a #15 scalpel blade.  Given the location of the defect, shape of the defect and the proximity to free margins a spiral flap was deemed most appropriate.  Using a sterile surgical marker, an appropriate rotation flap was drawn incorporating the defect and placing the expected incisions within the relaxed skin tension lines where possible. The area thus outlined was incised deep to adipose tissue with a #15 scalpel blade.  The skin margins were undermined to an appropriate distance in all directions utilizing iris scissors.
Scalpel Size: 15 blade
Cheek Interpolation Flap Text: A decision was made to reconstruct the defect utilizing an interpolation axial flap and a staged reconstruction.  A telfa template was made of the defect.  This telfa template was then used to outline the Cheek Interpolation flap.  The donor area for the pedicle flap was then injected with anesthesia.  The flap was excised through the skin and subcutaneous tissue down to the layer of the underlying musculature.  The interpolation flap was carefully excised within this deep plane to maintain its blood supply.  The edges of the donor site were undermined.   The donor site was closed in a primary fashion.  The pedicle was then rotated into position and sutured.  Once the tube was sutured into place, adequate blood supply was confirmed with blanching and refill.  The pedicle was then wrapped with xeroform gauze and dressed appropriately with a telfa and gauze bandage to ensure continued blood supply and protect the attached pedicle.
H Plasty Text: Given the location of the defect, shape of the defect and the proximity to free margins a H-plasty was deemed most appropriate for repair.  Using a sterile surgical marker, the appropriate advancement arms of the H-plasty were drawn incorporating the defect and placing the expected incisions within the relaxed skin tension lines where possible. The area thus outlined was incised deep to adipose tissue with a #15 scalpel blade. The skin margins were undermined to an appropriate distance in all directions utilizing iris scissors.  The opposing advancement arms were then advanced into place in opposite direction and anchored with interrupted buried subcutaneous sutures.
Crescentic Advancement Flap Text: The defect edges were debeveled with a #15 scalpel blade.  Given the location of the defect and the proximity to free margins a crescentic advancement flap was deemed most appropriate.  Using a sterile surgical marker, the appropriate advancement flap was drawn incorporating the defect and placing the expected incisions within the relaxed skin tension lines where possible.    The area thus outlined was incised deep to adipose tissue with a #15 scalpel blade.  The skin margins were undermined to an appropriate distance in all directions utilizing iris scissors.
O-Z Plasty Text: The defect edges were debeveled with a #15 scalpel blade.  Given the location of the defect, shape of the defect and the proximity to free margins an O-Z plasty (double transposition flap) was deemed most appropriate.  Using a sterile surgical marker, the appropriate transposition flaps were drawn incorporating the defect and placing the expected incisions within the relaxed skin tension lines where possible.    The area thus outlined was incised deep to adipose tissue with a #15 scalpel blade.  The skin margins were undermined to an appropriate distance in all directions utilizing iris scissors.  Hemostasis was achieved with electrocautery.  The flaps were then transposed into place, one clockwise and the other counterclockwise, and anchored with interrupted buried subcutaneous sutures.
Slit Excision Additional Text (Leave Blank If You Do Not Want): A linear line was drawn on the skin overlying the lesion. An incision was made slowly until the lesion was visualized.  Once visualized, the lesion was removed with blunt dissection.
Anesthesia Type: 1% lidocaine with epinephrine and a 1:10 solution of 8.4% sodium bicarbonate
Complex Repair And O-L Flap Text: The defect edges were debeveled with a #15 scalpel blade.  The primary defect was closed partially with a complex linear closure.  Given the location of the remaining defect, shape of the defect and the proximity to free margins an O-L flap was deemed most appropriate for complete closure of the defect.  Using a sterile surgical marker, an appropriate flap was drawn incorporating the defect and placing the expected incisions within the relaxed skin tension lines where possible.    The area thus outlined was incised deep to adipose tissue with a #15 scalpel blade.  The skin margins were undermined to an appropriate distance in all directions utilizing iris scissors.
Complex Repair And Single Advancement Flap Text: The defect edges were debeveled with a #15 scalpel blade.  The primary defect was closed partially with a complex linear closure.  Given the location of the remaining defect, shape of the defect and the proximity to free margins a single advancement flap was deemed most appropriate for complete closure of the defect.  Using a sterile surgical marker, an appropriate advancement flap was drawn incorporating the defect and placing the expected incisions within the relaxed skin tension lines where possible.    The area thus outlined was incised deep to adipose tissue with a #15 scalpel blade.  The skin margins were undermined to an appropriate distance in all directions utilizing iris scissors.
Ftsg Text: The defect edges were debeveled with a #15 scalpel blade.  Given the location of the defect, shape of the defect and the proximity to free margins a full thickness skin graft was deemed most appropriate.  Using a sterile surgical marker, the primary defect shape was transferred to the donor site. The area thus outlined was incised deep to adipose tissue with a #15 scalpel blade.  The harvested graft was then trimmed of adipose tissue until only dermis and epidermis was left.  The skin margins of the secondary defect were undermined to an appropriate distance in all directions utilizing iris scissors.  The secondary defect was closed with interrupted buried subcutaneous sutures.  The skin edges were then re-apposed with running  sutures.  The skin graft was then placed in the primary defect and oriented appropriately.
No Repair - Repaired With Adjacent Surgical Defect Text (Leave Blank If You Do Not Want): After the excision the defect was repaired concurrently with another surgical defect which was in close approximation.
Tissue Cultured Epidermal Autograft Text: The defect edges were debeveled with a #15 scalpel blade.  Given the location of the defect, shape of the defect and the proximity to free margins a tissue cultured epidermal autograft was deemed most appropriate.  The graft was then trimmed to fit the size of the defect.  The graft was then placed in the primary defect and oriented appropriately.
Body Location Override (Optional - Billing Will Still Be Based On Selected Body Map Location If Applicable): l lateral neck
Primary Defect Width (In Cm): 1.6
Lip Wedge Excision Repair Text: Given the location of the defect and the proximity to free margins a full thickness wedge repair was deemed most appropriate.  Using a sterile surgical marker, the appropriate repair was drawn incorporating the defect and placing the expected incisions perpendicular to the vermillion border.  The vermillion border was also meticulously outlined to ensure appropriate reapproximation during the repair.  The area thus outlined was incised through and through with a #15 scalpel blade.  The muscularis and dermis were reaproximated with deep sutures following hemostasis. Care was taken to realign the vermillion border before proceeding with the superficial closure.  Once the vermillion was realigned the superfical and mucosal closure was finished.
Complex Repair And Skin Substitute Graft Text: The defect edges were debeveled with a #15 scalpel blade.  The primary defect was closed partially with a complex linear closure.  Given the location of the remaining defect, shape of the defect and the proximity to free margins a skin substitute graft was deemed most appropriate to repair the remaining defect.  The graft was trimmed to fit the size of the remaining defect.  The graft was then placed in the primary defect, oriented appropriately, and sutured into place.
Dermal Autograft Text: The defect edges were debeveled with a #15 scalpel blade.  Given the location of the defect, shape of the defect and the proximity to free margins a dermal autograft was deemed most appropriate.  Using a sterile surgical marker, the primary defect shape was transferred to the donor site. The area thus outlined was incised deep to adipose tissue with a #15 scalpel blade.  The harvested graft was then trimmed of adipose and epidermal tissue until only dermis was left.  The skin graft was then placed in the primary defect and oriented appropriately.
Keystone Flap Text: The defect edges were debeveled with a #15 scalpel blade.  Given the location of the defect, shape of the defect a keystone flap was deemed most appropriate.  Using a sterile surgical marker, an appropriate keystone flap was drawn incorporating the defect, outlining the appropriate donor tissue and placing the expected incisions within the relaxed skin tension lines where possible. The area thus outlined was incised deep to adipose tissue with a #15 scalpel blade.  The skin margins were undermined to an appropriate distance in all directions around the primary defect and laterally outward around the flap utilizing iris scissors.
Skin Substitute Text: The defect edges were debeveled with a #15 scalpel blade.  Given the location of the defect, shape of the defect and the proximity to free margins a skin substitute graft was deemed most appropriate.  The graft material was trimmed to fit the size of the defect. The graft was then placed in the primary defect and oriented appropriately.
Interpolation Flap Text: A decision was made to reconstruct the defect utilizing an interpolation axial flap and a staged reconstruction.  A telfa template was made of the defect.  This telfa template was then used to outline the interpolation flap.  The donor area for the pedicle flap was then injected with anesthesia.  The flap was excised through the skin and subcutaneous tissue down to the layer of the underlying musculature.  The interpolation flap was carefully excised within this deep plane to maintain its blood supply.  The edges of the donor site were undermined.   The donor site was closed in a primary fashion.  The pedicle was then rotated into position and sutured.  Once the tube was sutured into place, adequate blood supply was confirmed with blanching and refill.  The pedicle was then wrapped with xeroform gauze and dressed appropriately with a telfa and gauze bandage to ensure continued blood supply and protect the attached pedicle.
Complex Repair And Epidermal Autograft Text: The defect edges were debeveled with a #15 scalpel blade.  The primary defect was closed partially with a complex linear closure.  Given the location of the defect, shape of the defect and the proximity to free margins an epidermal autograft was deemed most appropriate to repair the remaining defect.  The graft was trimmed to fit the size of the remaining defect.  The graft was then placed in the primary defect, oriented appropriately, and sutured into place.
W Plasty Text: The lesion was extirpated to the level of the fat with a #15 scalpel blade.  Given the location of the defect, shape of the defect and the proximity to free margins a W-plasty was deemed most appropriate for repair.  Using a sterile surgical marker, the appropriate transposition arms of the W-plasty were drawn incorporating the defect and placing the expected incisions within the relaxed skin tension lines where possible.    The area thus outlined was incised deep to adipose tissue with a #15 scalpel blade.  The skin margins were undermined to an appropriate distance in all directions utilizing iris scissors.  The opposing transposition arms were then transposed into place in opposite direction and anchored with interrupted buried subcutaneous sutures.
Dorsal Nasal Flap Text: The defect edges were debeveled with a #15 scalpel blade.  Given the location of the defect and the proximity to free margins a dorsal nasal flap was deemed most appropriate.  Using a sterile surgical marker, an appropriate dorsal nasal flap was drawn around the defect.    The area thus outlined was incised deep to adipose tissue with a #15 scalpel blade.  The skin margins were undermined to an appropriate distance in all directions utilizing iris scissors.
Repair Performed By Another Provider Text (Leave Blank If You Do Not Want): After the tissue was excised the defect was repaired by another provider.
Rhombic Flap Text: The defect edges were debeveled with a #15 scalpel blade.  Given the location of the defect and the proximity to free margins a rhombic flap was deemed most appropriate.  Using a sterile surgical marker, an appropriate rhombic flap was drawn incorporating the defect.    The area thus outlined was incised deep to adipose tissue with a #15 scalpel blade.  The skin margins were undermined to an appropriate distance in all directions utilizing iris scissors.
Complex Repair And W Plasty Text: The defect edges were debeveled with a #15 scalpel blade.  The primary defect was closed partially with a complex linear closure.  Given the location of the remaining defect, shape of the defect and the proximity to free margins a W plasty was deemed most appropriate for complete closure of the defect.  Using a sterile surgical marker, an appropriate advancement flap was drawn incorporating the defect and placing the expected incisions within the relaxed skin tension lines where possible.    The area thus outlined was incised deep to adipose tissue with a #15 scalpel blade.  The skin margins were undermined to an appropriate distance in all directions utilizing iris scissors.
Medical Necessity Clause: This procedure was medically necessary because the lesion that was treated was: atypical
Rhomboid Transposition Flap Text: The defect edges were debeveled with a #15 scalpel blade.  Given the location of the defect and the proximity to free margins a rhomboid transposition flap was deemed most appropriate.  Using a sterile surgical marker, an appropriate rhomboid flap was drawn incorporating the defect.    The area thus outlined was incised deep to adipose tissue with a #15 scalpel blade.  The skin margins were undermined to an appropriate distance in all directions utilizing iris scissors.
Complex Repair And Transposition Flap Text: The defect edges were debeveled with a #15 scalpel blade.  The primary defect was closed partially with a complex linear closure.  Given the location of the remaining defect, shape of the defect and the proximity to free margins a transposition flap was deemed most appropriate for complete closure of the defect.  Using a sterile surgical marker, an appropriate advancement flap was drawn incorporating the defect and placing the expected incisions within the relaxed skin tension lines where possible.    The area thus outlined was incised deep to adipose tissue with a #15 scalpel blade.  The skin margins were undermined to an appropriate distance in all directions utilizing iris scissors.
Complex Repair And Ftsg Text: The defect edges were debeveled with a #15 scalpel blade.  The primary defect was closed partially with a complex linear closure.  Given the location of the defect, shape of the defect and the proximity to free margins a full thickness skin graft was deemed most appropriate to repair the remaining defect.  The graft was trimmed to fit the size of the remaining defect.  The graft was then placed in the primary defect, oriented appropriately, and sutured into place.
Complex Repair And A-T Advancement Flap Text: The defect edges were debeveled with a #15 scalpel blade.  The primary defect was closed partially with a complex linear closure.  Given the location of the remaining defect, shape of the defect and the proximity to free margins an A-T advancement flap was deemed most appropriate for complete closure of the defect.  Using a sterile surgical marker, an appropriate advancement flap was drawn incorporating the defect and placing the expected incisions within the relaxed skin tension lines where possible.    The area thus outlined was incised deep to adipose tissue with a #15 scalpel blade.  The skin margins were undermined to an appropriate distance in all directions utilizing iris scissors.
Island Pedicle Flap With Canthal Suspension Text: The defect edges were debeveled with a #15 scalpel blade.  Given the location of the defect, shape of the defect and the proximity to free margins an island pedicle advancement flap was deemed most appropriate.  Using a sterile surgical marker, an appropriate advancement flap was drawn incorporating the defect, outlining the appropriate donor tissue and placing the expected incisions within the relaxed skin tension lines where possible. The area thus outlined was incised deep to adipose tissue with a #15 scalpel blade.  The skin margins were undermined to an appropriate distance in all directions around the primary defect and laterally outward around the island pedicle utilizing iris scissors.  There was minimal undermining beneath the pedicle flap. A suspension suture was placed in the canthal tendon to prevent tension and prevent ectropion.
Alar Island Pedicle Flap Text: The defect edges were debeveled with a #15 scalpel blade.  Given the location of the defect, shape of the defect and the proximity to the alar rim an island pedicle advancement flap was deemed most appropriate.  Using a sterile surgical marker, an appropriate advancement flap was drawn incorporating the defect, outlining the appropriate donor tissue and placing the expected incisions within the nasal ala running parallel to the alar rim. The area thus outlined was incised with a #15 scalpel blade.  The skin margins were undermined minimally to an appropriate distance in all directions around the primary defect and laterally outward around the island pedicle utilizing iris scissors.  There was minimal undermining beneath the pedicle flap.
Mucosal Advancement Flap Text: Given the location of the defect, shape of the defect and the proximity to free margins a mucosal advancement flap was deemed most appropriate. Incisions were made with a 15 blade scalpel in the appropriate fashion along the cutaneous vermillion border and the mucosal lip. The remaining actinically damaged mucosal tissue was excised.  The mucosal advancement flap was then elevated to the gingival sulcus with care taken to preserve the neurovascular structures and advanced into the primary defect. Care was taken to ensure that precise realignment of the vermillion border was achieved.
Cartilage Graft Text: The defect edges were debeveled with a #15 scalpel blade.  Given the location of the defect, shape of the defect, the fact the defect involved a full thickness cartilage defect a cartilage graft was deemed most appropriate.  An appropriate donor site was identified, cleansed, and anesthetized. The cartilage graft was then harvested and transferred to the recipient site, oriented appropriately and then sutured into place.  The secondary defect was then repaired using a primary closure.
Burow's Advancement Flap Text: The defect edges were debeveled with a #15 scalpel blade.  Given the location of the defect and the proximity to free margins a Burow's advancement flap was deemed most appropriate.  Using a sterile surgical marker, the appropriate advancement flap was drawn incorporating the defect and placing the expected incisions within the relaxed skin tension lines where possible.    The area thus outlined was incised deep to adipose tissue with a #15 scalpel blade.  The skin margins were undermined to an appropriate distance in all directions utilizing iris scissors.
Z Plasty Text: The lesion was extirpated to the level of the fat with a #15 scalpel blade.  Given the location of the defect, shape of the defect and the proximity to free margins a Z-plasty was deemed most appropriate for repair.  Using a sterile surgical marker, the appropriate transposition arms of the Z-plasty were drawn incorporating the defect and placing the expected incisions within the relaxed skin tension lines where possible.    The area thus outlined was incised deep to adipose tissue with a #15 scalpel blade.  The skin margins were undermined to an appropriate distance in all directions utilizing iris scissors.  The opposing transposition arms were then transposed into place in opposite direction and anchored with interrupted buried subcutaneous sutures.
Complex Repair And Bilobe Flap Text: The defect edges were debeveled with a #15 scalpel blade.  The primary defect was closed partially with a complex linear closure.  Given the location of the remaining defect, shape of the defect and the proximity to free margins a bilobe flap was deemed most appropriate for complete closure of the defect.  Using a sterile surgical marker, an appropriate advancement flap was drawn incorporating the defect and placing the expected incisions within the relaxed skin tension lines where possible.    The area thus outlined was incised deep to adipose tissue with a #15 scalpel blade.  The skin margins were undermined to an appropriate distance in all directions utilizing iris scissors.
Posterior Auricular Interpolation Flap Text: A decision was made to reconstruct the defect utilizing an interpolation axial flap and a staged reconstruction.  A telfa template was made of the defect.  This telfa template was then used to outline the posterior auricular interpolation flap.  The donor area for the pedicle flap was then injected with anesthesia.  The flap was excised through the skin and subcutaneous tissue down to the layer of the underlying musculature.  The pedicle flap was carefully excised within this deep plane to maintain its blood supply.  The edges of the donor site were undermined.   The donor site was closed in a primary fashion.  The pedicle was then rotated into position and sutured.  Once the tube was sutured into place, adequate blood supply was confirmed with blanching and refill.  The pedicle was then wrapped with xeroform gauze and dressed appropriately with a telfa and gauze bandage to ensure continued blood supply and protect the attached pedicle.
Complex Repair And Dorsal Nasal Flap Text: The defect edges were debeveled with a #15 scalpel blade.  The primary defect was closed partially with a complex linear closure.  Given the location of the remaining defect, shape of the defect and the proximity to free margins a dorsal nasal flap was deemed most appropriate for complete closure of the defect.  Using a sterile surgical marker, an appropriate flap was drawn incorporating the defect and placing the expected incisions within the relaxed skin tension lines where possible.    The area thus outlined was incised deep to adipose tissue with a #15 scalpel blade.  The skin margins were undermined to an appropriate distance in all directions utilizing iris scissors.
Paramedian Forehead Flap Text: A decision was made to reconstruct the defect utilizing an interpolation axial flap and a staged reconstruction.  A telfa template was made of the defect.  This telfa template was then used to outline the paramedian forehead pedicle flap.  The donor area for the pedicle flap was then injected with anesthesia.  The flap was excised through the skin and subcutaneous tissue down to the layer of the underlying musculature.  The pedicle flap was carefully excised within this deep plane to maintain its blood supply.  The edges of the donor site were undermined.   The donor site was closed in a primary fashion.  The pedicle was then rotated into position and sutured.  Once the tube was sutured into place, adequate blood supply was confirmed with blanching and refill.  The pedicle was then wrapped with xeroform gauze and dressed appropriately with a telfa and gauze bandage to ensure continued blood supply and protect the attached pedicle.
Complex Repair And O-T Advancement Flap Text: The defect edges were debeveled with a #15 scalpel blade.  The primary defect was closed partially with a complex linear closure.  Given the location of the remaining defect, shape of the defect and the proximity to free margins an O-T advancement flap was deemed most appropriate for complete closure of the defect.  Using a sterile surgical marker, an appropriate advancement flap was drawn incorporating the defect and placing the expected incisions within the relaxed skin tension lines where possible.    The area thus outlined was incised deep to adipose tissue with a #15 scalpel blade.  The skin margins were undermined to an appropriate distance in all directions utilizing iris scissors.
Epidermal Sutures: 5-0 Surgipro
Complex Repair And Split-Thickness Skin Graft Text: The defect edges were debeveled with a #15 scalpel blade.  The primary defect was closed partially with a complex linear closure.  Given the location of the defect, shape of the defect and the proximity to free margins a split thickness skin graft was deemed most appropriate to repair the remaining defect.  The graft was trimmed to fit the size of the remaining defect.  The graft was then placed in the primary defect, oriented appropriately, and sutured into place.
Bilateral Helical Rim Advancement Flap Text: The defect edges were debeveled with a #15 blade scalpel.  Given the location of the defect and the proximity to free margins (helical rim) a bilateral helical rim advancement flap was deemed most appropriate.  Using a sterile surgical marker, the appropriate advancement flaps were drawn incorporating the defect and placing the expected incisions between the helical rim and antihelix where possible.  The area thus outlined was incised through and through with a #15 scalpel blade.  With a skin hook and iris scissors, the flaps were gently and sharply undermined and freed up.
Epidermal Closure Graft Donor Site (Optional): simple interrupted
Bilobed Flap Text: The defect edges were debeveled with a #15 scalpel blade.  Given the location of the defect and the proximity to free margins a bilobe flap was deemed most appropriate.  Using a sterile surgical marker, an appropriate bilobe flap drawn around the defect.    The area thus outlined was incised deep to adipose tissue with a #15 scalpel blade.  The skin margins were undermined to an appropriate distance in all directions utilizing iris scissors.
Island Pedicle Flap Text: The defect edges were debeveled with a #15 scalpel blade.  Given the location of the defect, shape of the defect and the proximity to free margins an island pedicle advancement flap was deemed most appropriate.  Using a sterile surgical marker, an appropriate advancement flap was drawn incorporating the defect, outlining the appropriate donor tissue and placing the expected incisions within the relaxed skin tension lines where possible.    The area thus outlined was incised deep to adipose tissue with a #15 scalpel blade.  The skin margins were undermined to an appropriate distance in all directions around the primary defect and laterally outward around the island pedicle utilizing iris scissors.  There was minimal undermining beneath the pedicle flap.
Complex Repair And Rhombic Flap Text: The defect edges were debeveled with a #15 scalpel blade.  The primary defect was closed partially with a complex linear closure.  Given the location of the remaining defect, shape of the defect and the proximity to free margins a rhombic flap was deemed most appropriate for complete closure of the defect.  Using a sterile surgical marker, an appropriate advancement flap was drawn incorporating the defect and placing the expected incisions within the relaxed skin tension lines where possible.    The area thus outlined was incised deep to adipose tissue with a #15 scalpel blade.  The skin margins were undermined to an appropriate distance in all directions utilizing iris scissors.
Complex Repair And Dermal Autograft Text: The defect edges were debeveled with a #15 scalpel blade.  The primary defect was closed partially with a complex linear closure.  Given the location of the defect, shape of the defect and the proximity to free margins an dermal autograft was deemed most appropriate to repair the remaining defect.  The graft was trimmed to fit the size of the remaining defect.  The graft was then placed in the primary defect, oriented appropriately, and sutured into place.
Complex Repair And V-Y Plasty Text: The defect edges were debeveled with a #15 scalpel blade.  The primary defect was closed partially with a complex linear closure.  Given the location of the remaining defect, shape of the defect and the proximity to free margins a V-Y plasty was deemed most appropriate for complete closure of the defect.  Using a sterile surgical marker, an appropriate advancement flap was drawn incorporating the defect and placing the expected incisions within the relaxed skin tension lines where possible.    The area thus outlined was incised deep to adipose tissue with a #15 scalpel blade.  The skin margins were undermined to an appropriate distance in all directions utilizing iris scissors.
Complex Repair And Double M Plasty Text: The defect edges were debeveled with a #15 scalpel blade.  The primary defect was closed partially with a complex linear closure.  Given the location of the remaining defect, shape of the defect and the proximity to free margins a double M plasty was deemed most appropriate for complete closure of the defect.  Using a sterile surgical marker, an appropriate advancement flap was drawn incorporating the defect and placing the expected incisions within the relaxed skin tension lines where possible.    The area thus outlined was incised deep to adipose tissue with a #15 scalpel blade.  The skin margins were undermined to an appropriate distance in all directions utilizing iris scissors.
Size Of Margin In Cm: 0.1
Primary Defect Length (In Cm): 1.7
Bi-Rhombic Flap Text: The defect edges were debeveled with a #15 scalpel blade.  Given the location of the defect and the proximity to free margins a bi-rhombic flap was deemed most appropriate.  Using a sterile surgical marker, an appropriate rhombic flap was drawn incorporating the defect. The area thus outlined was incised deep to adipose tissue with a #15 scalpel blade.  The skin margins were undermined to an appropriate distance in all directions utilizing iris scissors.
S Plasty Text: Given the location and shape of the defect, and the orientation of relaxed skin tension lines, an S-plasty was deemed most appropriate for repair.  Using a sterile surgical marker, the appropriate outline of the S-plasty was drawn, incorporating the defect and placing the expected incisions within the relaxed skin tension lines where possible.  The area thus outlined was incised deep to adipose tissue with a #15 scalpel blade.  The skin margins were undermined to an appropriate distance in all directions utilizing iris scissors. The skin flaps were advanced over the defect.  The opposing margins were then approximated with interrupted buried subcutaneous sutures.

## 2019-01-30 ENCOUNTER — APPOINTMENT (OUTPATIENT)
Age: 69
Setting detail: DERMATOLOGY
End: 2019-01-31

## 2019-01-30 DIAGNOSIS — Z48.02 ENCOUNTER FOR REMOVAL OF SUTURES: ICD-10-CM

## 2019-01-30 PROCEDURE — OTHER COUNSELING: OTHER

## 2019-01-30 PROCEDURE — OTHER DIAGNOSIS COMMENT: OTHER

## 2019-01-30 PROCEDURE — OTHER MIPS QUALITY: OTHER

## 2019-01-30 PROCEDURE — OTHER SUTURE REMOVAL (GLOBAL PERIOD): OTHER

## 2019-01-30 ASSESSMENT — LOCATION SIMPLE DESCRIPTION DERM: LOCATION SIMPLE: NECK

## 2019-01-30 ASSESSMENT — LOCATION DETAILED DESCRIPTION DERM: LOCATION DETAILED: LEFT SUPERIOR LATERAL NECK

## 2019-01-30 ASSESSMENT — LOCATION ZONE DERM: LOCATION ZONE: NECK

## 2019-01-30 NOTE — PROCEDURE: SUTURE REMOVAL (GLOBAL PERIOD)
Add 01304 Cpt? (Important Note: In 2017 The Use Of 95994 Is Being Tracked By Cms To Determine Future Global Period Reimbursement For Global Periods): no
Body Location Override (Optional - Billing Will Still Be Based On Selected Body Map Location If Applicable): L lateral neck
Detail Level: Detailed

## 2019-04-12 ENCOUNTER — OFFICE VISIT (OUTPATIENT)
Dept: URGENT CARE | Facility: URGENT CARE | Age: 69
End: 2019-04-12
Payer: MEDICARE

## 2019-04-12 VITALS
OXYGEN SATURATION: 96 % | DIASTOLIC BLOOD PRESSURE: 88 MMHG | HEART RATE: 86 BPM | TEMPERATURE: 97.9 F | SYSTOLIC BLOOD PRESSURE: 150 MMHG | WEIGHT: 187.4 LBS | BODY MASS INDEX: 26.89 KG/M2

## 2019-04-12 DIAGNOSIS — L72.3 INFECTED SEBACEOUS CYST OF SKIN: Primary | ICD-10-CM

## 2019-04-12 DIAGNOSIS — L08.9 INFECTED SEBACEOUS CYST OF SKIN: Primary | ICD-10-CM

## 2019-04-12 DIAGNOSIS — I10 ESSENTIAL HYPERTENSION, BENIGN: ICD-10-CM

## 2019-04-12 LAB
GRAM STN SPEC: ABNORMAL
Lab: ABNORMAL
SPECIMEN SOURCE: ABNORMAL

## 2019-04-12 PROCEDURE — 87186 SC STD MICRODIL/AGAR DIL: CPT | Performed by: FAMILY MEDICINE

## 2019-04-12 PROCEDURE — 87205 SMEAR GRAM STAIN: CPT | Performed by: FAMILY MEDICINE

## 2019-04-12 PROCEDURE — 87077 CULTURE AEROBIC IDENTIFY: CPT | Performed by: FAMILY MEDICINE

## 2019-04-12 PROCEDURE — 87070 CULTURE OTHR SPECIMN AEROBIC: CPT | Performed by: FAMILY MEDICINE

## 2019-04-12 PROCEDURE — 10060 I&D ABSCESS SIMPLE/SINGLE: CPT | Performed by: FAMILY MEDICINE

## 2019-04-12 PROCEDURE — 99214 OFFICE O/P EST MOD 30 MIN: CPT | Mod: 25 | Performed by: FAMILY MEDICINE

## 2019-04-12 RX ORDER — SULFAMETHOXAZOLE/TRIMETHOPRIM 800-160 MG
1 TABLET ORAL 2 TIMES DAILY
Qty: 14 TABLET | Refills: 0 | Status: SHIPPED | OUTPATIENT
Start: 2019-04-12 | End: 2019-09-04

## 2019-04-12 NOTE — PROGRESS NOTES
SUBJECTIVE:   Saji Morrison is a 68 year old male history of hypertension hyperlipidemia presenting with a chief complaint of painful swelling in the lower mid back , patient has history of sebaceous cyst in that area for several years did get infected in the past.  Has been noticing increasing pain redness and swelling in the area for last 3 days.  Patient is here for follow-up on the painful lump.  During the office visit noted his blood pressure was elevated and patient is aware of it patient has been taking his blood pressure lowering medication on a regular basis He is an established patient of [facility].  Onset of symptoms was 3 day(s) ago.  Course of illness is worsening.    Severity moderate  Current and Associated symptoms: Painful erythematous lump in the mid back  Treatment measures tried include None tried.  Predisposing factors include None.    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 100mg daily) at least 30 minutes before intercourse. 30 tablet 11     sulfADIAZINE 500 MG tablet Take 500 mg by mouth 4 times daily       sulfamethoxazole-trimethoprim (BACTRIM DS/SEPTRA DS) 800-160 MG tablet Take 1 tablet by mouth 2 times daily for 7 days 14 tablet 0     tamsulosin (FLOMAX) 0.4 MG capsule Take 1 capsule (0.4 mg) by mouth daily 30 capsule 1     Social History     Tobacco Use     Smoking status: Never  Smoker     Smokeless tobacco: Never Used   Substance Use Topics     Alcohol use: Yes     Alcohol/week: 0.0 oz     Comment: Occasionally.     Family History   Problem Relation Age of Onset     C.A.D. Father      Hypertension Father      Hypertension Mother      Hypertension Brother            ASSESSMENT:  Sebaceous cyst with abscess formation.      ROS:    10 point ROS of systems including Constitutional, Eyes, Respiratory, Cardiovascular, Gastroenterology, Genitourinary, Integumentary,  Psychiatric were all negative except for pertinent positives noted in my HPI         OBJECTIVE:  /88   Pulse 86   Temp 97.9  F (36.6  C) (Oral)   Wt 85 kg (187 lb 6.4 oz)   SpO2 96%   BMI 26.89 kg/m    GENERAL APPEARANCE: healthy, alert and no distress  EYES: EOMI,  PERRL, conjunctiva clear  RESP: lungs clear to auscultation - no rales, rhonchi or wheezes  CV: regular rates and rhythm, normal S1 S2, no murmur noted  SKIN: Fluctuant abscess noted on the mid back .  Size 3cm by 4cm  cm. There is   surrounding induration, erythema and tenderness. Afebrile.      After informed consent was obtained, using Betadine for cleansing   and 1% Lidocaine  with epinephrine for anesthetic, with sterile   technique, an incision was made into the abscess cavity which was   then drained of purulent material.  The cavity was irrigated, and   packed with iodoform guaze.  Culture obtained. Procedure well   tolerated.  Dressing applied and wound care instructions provided.   May remove packing in 48 hours, and continue frequent warm tub soaks   until abscess resolves    ASSESSMENT:  Saji was seen today for urgent care.    Diagnoses and all orders for this visit:    Infected sebaceous cyst of skin  -     sulfamethoxazole-trimethoprim (BACTRIM DS/SEPTRA DS) 800-160 MG tablet; Take 1 tablet by mouth 2 times daily for 7 days  -     Wound Culture Aerobic Bacterial  -     Gram stain  -     DRAIN SKIN ABSCESS SIMPLE/SINGLE    Essential hypertension,  benign      High blood pressure number with patient and advised to continue taking blood pressure medication as directed    PLAN:  Tylenol, Fluids, Rest and apply warm packs to area  Will treat patient with antibiotic for 7 days  Discussed with patient follow-up with PCP for wound dressing  Discussed the packing should come out on its own if notices worsening  Drainage and pain should follow-up  See orders in Epic    Paty Gonzalez MD

## 2019-04-12 NOTE — PATIENT INSTRUCTIONS
Patient Education     Epidermoid Cyst (Sebaceous Cyst), Infected (Antibiotic Treatment)  You have an epidermoid cyst. This is a small, painless lump under your skin. An epidermoid cyst (often called a sebaceous cyst, epidermal cyst, or epidermal inclusion cyst) is a term most often used for 2 similar types of cysts:    Epidermoid cysts. These cysts form slowly under the skin. They can be found on most parts of the body. But they are most often found on areas with more hair such as the scalp, face, upper back, and genitals.    Pilar cysts. These are similar to epidermoid cysts. But they start from a different part of the hair follicle. They are more likely to be on the scalp.  Here are some general facts about these cysts:    A cyst is a sac filled with material that is often cheesy, fatty, oily, or stringy. The material inside can be thick. Or it can be a liquid.    You can usually move the cyst slightly if you try.    The cysts can be smaller than a pea or as large as a few inches.    The cysts are usually not painful, unless they become inflamed or infected.    The area around the cyst may smell bad. If the cyst breaks open, the material inside it often smells bad as well.  Your cyst became infected and your healthcare provider wanted to treat it with antibiotics. If the antibiotics don t clear up the infection, the cyst will need to be drained by making a small cut (incision). Local anesthesia will be used to numb the area.  Home care    Resist the temptation to squeeze or pop the cyst, stick a needle in it, or cut it open. This often leads to a worsening infection and scarring.    Take the antibiotic as directed until it is all used up.    Soak the affected area in hot water or apply a hot pack (a thin, clean towel soaked in hot water) for 20 minutes at a time. Do this 3 to 4 times a day.    Apply antibiotic cream or ointment 3 times a day.    You may use over-the-counter pain medicine to control pain, unless  another medicine was given. If you have chronic liver or kidney disease or ever had a stomach ulcer or GI bleeding, talk with your healthcare provider before using these medicines.  Prevention  Once this infection has healed, reduce the risk of future infections by:    Keeping the cyst area clean by bathing or showering daily    Avoiding tight-fitting clothing in the cyst area  Follow-up care  Follow up with your healthcare provider, or as advised. If a gauze packing was put in your wound, it should be removed in a few days as advised by your healthcare provider. Check your wound every day for the signs listed below.  When to seek medical advice  Call your healthcare provider right away if any of these occur:    Pus coming from the cyst    Increasing redness around the wound    Increasing local pain or swelling    Fever of 100.4 F (38 C) or higher, or as directed by your provider  Date Last Reviewed: 10/5/2016    2668-2940 [employer]. [address] [place], [place] [id number]. All rights reserved. This information is not intended as a substitute for professional medical care. Always follow your healthcare professional's instructions.         Saji to follow up with Primary Care provider regarding elevated blood pressure.

## 2019-04-16 LAB
BACTERIA SPEC CULT: ABNORMAL
BACTERIA SPEC CULT: ABNORMAL
Lab: ABNORMAL
SPECIMEN SOURCE: ABNORMAL

## 2019-09-04 ENCOUNTER — OFFICE VISIT (OUTPATIENT)
Dept: INTERNAL MEDICINE | Facility: CLINIC | Age: 69
End: 2019-09-04
Payer: MEDICARE

## 2019-09-04 VITALS
SYSTOLIC BLOOD PRESSURE: 124 MMHG | DIASTOLIC BLOOD PRESSURE: 82 MMHG | TEMPERATURE: 97.7 F | HEIGHT: 70 IN | OXYGEN SATURATION: 98 % | BODY MASS INDEX: 26.84 KG/M2 | WEIGHT: 187.5 LBS | HEART RATE: 66 BPM | RESPIRATION RATE: 15 BRPM

## 2019-09-04 DIAGNOSIS — Z12.11 COLON CANCER SCREENING: ICD-10-CM

## 2019-09-04 DIAGNOSIS — E78.5 HYPERLIPIDEMIA LDL GOAL <130: ICD-10-CM

## 2019-09-04 DIAGNOSIS — M45.8 ANKYLOSING SPONDYLITIS OF SACRAL REGION (H): ICD-10-CM

## 2019-09-04 DIAGNOSIS — Z00.00 MEDICARE ANNUAL WELLNESS VISIT, SUBSEQUENT: Primary | ICD-10-CM

## 2019-09-04 DIAGNOSIS — Z12.5 SCREENING PSA (PROSTATE SPECIFIC ANTIGEN): ICD-10-CM

## 2019-09-04 DIAGNOSIS — I10 ESSENTIAL HYPERTENSION, BENIGN: ICD-10-CM

## 2019-09-04 LAB
ALBUMIN SERPL-MCNC: 3.9 G/DL (ref 3.4–5)
ALP SERPL-CCNC: 76 U/L (ref 40–150)
ALT SERPL W P-5'-P-CCNC: 22 U/L (ref 0–70)
ANION GAP SERPL CALCULATED.3IONS-SCNC: 7 MMOL/L (ref 3–14)
AST SERPL W P-5'-P-CCNC: 21 U/L (ref 0–45)
BILIRUB SERPL-MCNC: 0.7 MG/DL (ref 0.2–1.3)
BUN SERPL-MCNC: 18 MG/DL (ref 7–30)
CALCIUM SERPL-MCNC: 8.5 MG/DL (ref 8.5–10.1)
CHLORIDE SERPL-SCNC: 108 MMOL/L (ref 94–109)
CHOLEST SERPL-MCNC: 242 MG/DL
CO2 SERPL-SCNC: 26 MMOL/L (ref 20–32)
CREAT SERPL-MCNC: 0.81 MG/DL (ref 0.66–1.25)
GFR SERPL CREATININE-BSD FRML MDRD: 90 ML/MIN/{1.73_M2}
GLUCOSE SERPL-MCNC: 94 MG/DL (ref 70–99)
HDLC SERPL-MCNC: 58 MG/DL
HGB BLD-MCNC: 13.6 G/DL (ref 13.3–17.7)
LDLC SERPL CALC-MCNC: 134 MG/DL
NONHDLC SERPL-MCNC: 184 MG/DL
POTASSIUM SERPL-SCNC: 4.1 MMOL/L (ref 3.4–5.3)
PROT SERPL-MCNC: 7.6 G/DL (ref 6.8–8.8)
PSA SERPL-ACNC: 2.19 UG/L (ref 0–4)
SODIUM SERPL-SCNC: 141 MMOL/L (ref 133–144)
TRIGL SERPL-MCNC: 252 MG/DL

## 2019-09-04 PROCEDURE — G0439 PPPS, SUBSEQ VISIT: HCPCS | Performed by: INTERNAL MEDICINE

## 2019-09-04 PROCEDURE — 85018 HEMOGLOBIN: CPT | Performed by: INTERNAL MEDICINE

## 2019-09-04 PROCEDURE — 80061 LIPID PANEL: CPT | Performed by: INTERNAL MEDICINE

## 2019-09-04 PROCEDURE — 36415 COLL VENOUS BLD VENIPUNCTURE: CPT | Performed by: INTERNAL MEDICINE

## 2019-09-04 PROCEDURE — G0103 PSA SCREENING: HCPCS | Performed by: INTERNAL MEDICINE

## 2019-09-04 PROCEDURE — 80053 COMPREHEN METABOLIC PANEL: CPT | Performed by: INTERNAL MEDICINE

## 2019-09-04 RX ORDER — LOSARTAN POTASSIUM 100 MG/1
100 TABLET ORAL DAILY
Qty: 90 TABLET | Refills: 3 | Status: SHIPPED | OUTPATIENT
Start: 2019-09-04 | End: 2020-02-04 | Stop reason: ALTCHOICE

## 2019-09-04 RX ORDER — INFLIXIMAB 100 MG/10ML
INJECTION, POWDER, LYOPHILIZED, FOR SOLUTION INTRAVENOUS
Qty: 1 EACH | Refills: 0
Start: 2019-09-04 | End: 2020-12-10

## 2019-09-04 ASSESSMENT — MIFFLIN-ST. JEOR: SCORE: 1616.74

## 2019-09-04 ASSESSMENT — ACTIVITIES OF DAILY LIVING (ADL): CURRENT_FUNCTION: NO ASSISTANCE NEEDED

## 2019-09-04 ASSESSMENT — PATIENT HEALTH QUESTIONNAIRE - PHQ9: SUM OF ALL RESPONSES TO PHQ QUESTIONS 1-9: 1

## 2019-09-04 NOTE — LETTER
[facility]  [address] [address]  [phone]      9/4/2019       Saji Morrison  [address] [place] [address]        Dear Saji,    I am pleased to inform you that your routine blood work including your hemoglobin, sodium, potassium, calcium, kidney and liver function tests are all normal.    Your cholesterol is high and could be treated more aggressively with better diet, exercise and weight loss.  Please follow-up with me to discuss your further medication options if you are interested.    In addition, your PSA or prostate screening antigen is normal and should be repeated annually.    Sincerely,      Yared Gallardo MD  Internal Medicine

## 2019-09-05 DIAGNOSIS — Z12.11 COLON CANCER SCREENING: ICD-10-CM

## 2019-09-05 PROCEDURE — 82274 ASSAY TEST FOR BLOOD FECAL: CPT | Performed by: INTERNAL MEDICINE

## 2019-09-11 LAB — HEMOCCULT STL QL IA: POSITIVE

## 2019-09-19 ENCOUNTER — OFFICE VISIT (OUTPATIENT)
Dept: INTERNAL MEDICINE | Facility: CLINIC | Age: 69
End: 2019-09-19
Payer: MEDICARE

## 2019-09-19 VITALS
BODY MASS INDEX: 27.2 KG/M2 | HEIGHT: 70 IN | WEIGHT: 190 LBS | OXYGEN SATURATION: 97 % | TEMPERATURE: 97.8 F | RESPIRATION RATE: 15 BRPM | DIASTOLIC BLOOD PRESSURE: 68 MMHG | SYSTOLIC BLOOD PRESSURE: 120 MMHG | HEART RATE: 81 BPM

## 2019-09-19 DIAGNOSIS — Z12.11 COLON CANCER SCREENING: ICD-10-CM

## 2019-09-19 DIAGNOSIS — I10 ESSENTIAL HYPERTENSION, BENIGN: ICD-10-CM

## 2019-09-19 DIAGNOSIS — R19.5 OCCULT BLOOD IN STOOLS: Primary | ICD-10-CM

## 2019-09-19 PROCEDURE — 90662 IIV NO PRSV INCREASED AG IM: CPT | Performed by: INTERNAL MEDICINE

## 2019-09-19 PROCEDURE — G0008 ADMIN INFLUENZA VIRUS VAC: HCPCS | Performed by: INTERNAL MEDICINE

## 2019-09-19 PROCEDURE — 99214 OFFICE O/P EST MOD 30 MIN: CPT | Mod: 25 | Performed by: INTERNAL MEDICINE

## 2019-09-19 ASSESSMENT — MIFFLIN-ST. JEOR: SCORE: 1628.08

## 2019-09-19 NOTE — PROGRESS NOTES
Subjective     Saji Morrison is a 69 year old male who presents to clinic today for the following health issues:    HPI     Follow up positive FIT from 9/5/19.  He has not had noted colonoscopy in October 2018 reported as stable.  He denies any active bleeding or obvious precipitating source for his recent positive occult stool.  He has been an has been taking Voltaren.    He denies any abdominal pain.  He does inform me that in addition to taking the Voltaren after breakfast he has been dosing Advil on a frequent basis prior to playing golf.      Patient Active Problem List   Diagnosis     Essential hypertension, benign     Impotence of organic origin     HYPERLIPIDEMIA LDL GOAL <130     ACP (advance care planning)     Ankylosing spondylitis of sacral region (H)     Past Surgical History:   Procedure Laterality Date     C NONSPECIFIC PROCEDURE  1967    ski accident L leg (torn ligaments)     COLONOSCOPY N/A 10/3/2018    Procedure: COLONOSCOPY;  colonoscopy;  Surgeon: Lia Cosme MD;  Location:  GI     LAPAROSCOPIC HERNIORRHAPHY INGUINAL BILATERAL Bilateral 12/5/2016    Procedure: LAPAROSCOPIC HERNIORRHAPHY INGUINAL BILATERAL;  Surgeon: Alec Johnson MD;  Location:  OR       Social History     Tobacco Use     Smoking status: Never Smoker     Smokeless tobacco: Never Used   Substance Use Topics     Alcohol use: Yes     Alcohol/week: 0.0 oz     Comment: Occasionally.     Family History   Problem Relation Age of Onset     C.A.D. Father      Hypertension Father      Hypertension Mother      Hypertension Brother          Current Outpatient Medications   Medication Sig Dispense Refill     diclofenac (VOLTAREN) 75 MG EC tablet        folic acid (FOLVITE) 1 MG tablet Take 1 mg by mouth daily       inFLIXimab (REMICADE) 100 MG injection Dosing being done per his outside rheumatologist as scheduled 1 each 0     losartan (COZAAR) 100 MG tablet Take 1 tablet (100 mg) by mouth daily 90 tablet 3      "sildenafil (VIAGRA) 100 MG tablet Take 1 tablet (100 mg) by mouth daily as needed (max. 100mg daily) at least 30 minutes before intercourse. 30 tablet 11     Allergies   Allergen Reactions     Cialis [Tadalafil]      Lisinopril      cough     Sertraline      Shakiness       Thiazide-Type Diuretics      PRINZIDE     BP Readings from Last 3 Encounters:   09/19/19 120/68   09/04/19 124/82   04/12/19 150/88    Wt Readings from Last 3 Encounters:   09/19/19 86.2 kg (190 lb)   09/04/19 85 kg (187 lb 8 oz)   04/12/19 85 kg (187 lb 6.4 oz)         Reviewed and updated as needed this visit by Provider         Review of Systems   ROS COMP: CONSTITUTIONAL: NEGATIVE for fever, chills, change in weight  ENT/MOUTH: NEGATIVE for ear, mouth and throat problems  RESP: NEGATIVE for significant cough or SOB  CV: NEGATIVE for chest pain, palpitations or peripheral edema  GI: NEGATIVE for nausea, abdominal pain, heartburn, or change in bowel habits  : NEGATIVE for frequency, dysuria, or hematuria  MUSCULOSKELETAL: NEGATIVE for significant arthralgias or myalgia  NEURO: NEGATIVE for weakness, dizziness or paresthesias  HEME: NEGATIVE for bleeding problems  PSYCHIATRIC: NEGATIVE for changes in mood or affect      Objective    /68   Pulse 81   Temp 97.8  F (36.6  C) (Tympanic)   Resp 15   Ht 1.778 m (5' 10\")   Wt 86.2 kg (190 lb)   SpO2 97%   BMI 27.26 kg/m    Body mass index is 27.26 kg/m .  Physical Exam   GENERAL: healthy, alert and no distress  EYES: Eyes grossly normal to inspection, PERRL and conjunctivae and sclerae normal  HENT: ear canals and TM's normal, nose and mouth without ulcers or lesions  NECK: no adenopathy, no asymmetry, masses, or scars and thyroid normal to palpation  RESP: lungs clear to auscultation - no rales, rhonchi or wheezes  CV: regular rate and rhythm, normal S1 S2, no S3 or S4, no murmur, click or rub, no peripheral edema and peripheral pulses strong  MS: no gross musculoskeletal defects " "noted  NEURO: No focal changes  PSYCH: mentation appears normal, affect normal/bright        Assessment & Plan     1. Occult blood in stools  Discussed with patient issues in regards to evaluation of his GI tract with a positive occult stool.  There is no obvious precipitating factors here and the patient has had a relatively recent colonoscopy.  It is unclear what degree the anti-inflammatories may be playing a role but the patient is currently asymptomatic at present.  His hemoglobin is also stable.  I have suggested he stop all anti-inflammatories and consider rechecking the occult stool test in 10 days.  We did discuss an upper GI and potential evaluation of his small bowel but we will await the repeat test pending his follow-up results  - Fecal colorectal cancer screen (FIT); Future    2. Essential hypertension, benign  Stable on therapy continuing with current medical management.    3. Colon cancer screening  Prior colonoscopy reviewed  - Fecal colorectal cancer screen (FIT); Future     BMI:   Estimated body mass index is 27.26 kg/m  as calculated from the following:    Height as of this encounter: 1.778 m (5' 10\").    Weight as of this encounter: 86.2 kg (190 lb).     See Patient Instructions    No follow-ups on file.    Yared Gallardo MD  [facility]      "

## 2019-09-24 ENCOUNTER — MYC MEDICAL ADVICE (OUTPATIENT)
Dept: INTERNAL MEDICINE | Facility: CLINIC | Age: 69
End: 2019-09-24

## 2019-10-01 DIAGNOSIS — R19.5 OCCULT BLOOD IN STOOLS: ICD-10-CM

## 2019-10-01 DIAGNOSIS — Z12.11 COLON CANCER SCREENING: ICD-10-CM

## 2019-10-01 PROCEDURE — 82274 ASSAY TEST FOR BLOOD FECAL: CPT | Performed by: INTERNAL MEDICINE

## 2019-10-03 ENCOUNTER — HEALTH MAINTENANCE LETTER (OUTPATIENT)
Age: 69
End: 2019-10-03

## 2019-10-07 LAB — HEMOCCULT STL QL IA: NEGATIVE

## 2019-10-09 ENCOUNTER — APPOINTMENT (OUTPATIENT)
Age: 69
Setting detail: DERMATOLOGY
End: 2019-11-04

## 2019-10-09 DIAGNOSIS — D485 NEOPLASM OF UNCERTAIN BEHAVIOR OF SKIN: ICD-10-CM

## 2019-10-09 DIAGNOSIS — Z85.828 PERSONAL HISTORY OF OTHER MALIGNANT NEOPLASM OF SKIN: ICD-10-CM

## 2019-10-09 DIAGNOSIS — Z71.89 OTHER SPECIFIED COUNSELING: ICD-10-CM

## 2019-10-09 DIAGNOSIS — L82.0 INFLAMED SEBORRHEIC KERATOSIS: ICD-10-CM

## 2019-10-09 PROBLEM — D48.5 NEOPLASM OF UNCERTAIN BEHAVIOR OF SKIN: Status: ACTIVE | Noted: 2019-10-09

## 2019-10-09 PROCEDURE — 11102 TANGNTL BX SKIN SINGLE LES: CPT | Mod: 59

## 2019-10-09 PROCEDURE — 17110 DESTRUCT B9 LESION 1-14: CPT

## 2019-10-09 PROCEDURE — OTHER COUNSELING: OTHER

## 2019-10-09 PROCEDURE — OTHER BIOPSY BY SHAVE METHOD: OTHER

## 2019-10-09 PROCEDURE — 11103 TANGNTL BX SKIN EA SEP/ADDL: CPT

## 2019-10-09 PROCEDURE — OTHER MIPS QUALITY: OTHER

## 2019-10-09 PROCEDURE — 99213 OFFICE O/P EST LOW 20 MIN: CPT | Mod: 25

## 2019-10-09 PROCEDURE — OTHER SUNSCREEN TREATMENT REGIMEN: OTHER

## 2019-10-09 PROCEDURE — OTHER DIAGNOSIS COMMENT: OTHER

## 2019-10-09 PROCEDURE — OTHER LIQUID NITROGEN: OTHER

## 2019-10-09 ASSESSMENT — LOCATION DETAILED DESCRIPTION DERM
LOCATION DETAILED: LEFT MEDIAL ZYGOMA
LOCATION DETAILED: LEFT POSTERIOR SHOULDER
LOCATION DETAILED: LEFT ANTERIOR PROXIMAL THIGH
LOCATION DETAILED: LEFT RADIAL DORSAL HAND

## 2019-10-09 ASSESSMENT — LOCATION ZONE DERM
LOCATION ZONE: LEG
LOCATION ZONE: FACE
LOCATION ZONE: HAND
LOCATION ZONE: ARM

## 2019-10-09 ASSESSMENT — LOCATION SIMPLE DESCRIPTION DERM
LOCATION SIMPLE: LEFT ZYGOMA
LOCATION SIMPLE: LEFT THIGH
LOCATION SIMPLE: LEFT HAND
LOCATION SIMPLE: LEFT SHOULDER

## 2019-10-09 NOTE — PROCEDURE: BIOPSY BY SHAVE METHOD
Billing Type: Third-Party Bill
Depth Of Biopsy: dermis
Destruction After The Procedure: Yes
Cryotherapy Text: The wound bed was treated with cryotherapy after the biopsy was performed.
Biopsy Method: double edge Personna blade
X Size Of Lesion In Cm: 1.5
Notification Instructions: Patient will be notified of biopsy results. However, patient instructed to call the office if not contacted within 2 weeks.
Detail Level: Detailed
Anesthesia Volume In Cc (Will Not Render If 0): 1
Anesthesia Type: 1% lidocaine with epinephrine and a 1:10 solution of 8.4% sodium bicarbonate
Size Of Lesion In Cm: 1.7
Body Location Override (Optional - Billing Will Still Be Based On Selected Body Map Location If Applicable): L lateral malar cheek
Additional Anesthesia Volume In Cc (Will Not Render If 0): 0
Electrodesiccation And Curettage Text: The wound bed was treated with electrodesiccation and curettage after the biopsy was performed.
Wound Care: Vaseline
Bill 37538 For Specimen Handling/Conveyance To Laboratory?: no
Hemostasis: Electrocautery
Type Of Destruction Used: Electrodesiccation
Path Notes (To The Dermatopathologist): Please check margins
Post-Care Instructions: I verbally reviewed with the patient in detail post-care instructions. Patient is to keep the biopsy site dry overnight, and then apply H2O2, Vaseline and a bandage daily until healed.
Electrodesiccation Text: The wound bed was treated with electrodesiccation after the biopsy was performed.
Curettage Text: The wound bed was treated with curettage after the biopsy was performed.
Dressing: bandage
Body Location Override (Optional - Billing Will Still Be Based On Selected Body Map Location If Applicable): L upper dorsal hand second ray
Hemostasis: Aluminum Chloride and Electrocautery
Consent: Verbal consent was obtained and risks were reviewed including but not limited to scarring, infection, bleeding, scabbing, incomplete removal, nerve damage and allergy to anesthesia.
Anesthesia Volume In Cc (Will Not Render If 0): 0.3
Silver Nitrate Text: The wound bed was treated with silver nitrate after the biopsy was performed.
Biopsy Type: H and E

## 2019-10-09 NOTE — PROCEDURE: LIQUID NITROGEN
Render Post-Care Instructions In Note?: yes
Number Of Freeze-Thaw Cycles: 2 freeze-thaw cycles
Post-Care Instructions: I reviewed with the patient in detail post-care instructions. Patient is to wear sunprotection, and avoid picking at any of the treated lesions. Pt may apply Vaseline to crusted or scabbing areas.
Duration Of Freeze Thaw-Cycle (Seconds): 10-15
Consent: The patient's consent was obtained including but not limited to risks of crusting, scabbing, blistering, scarring, darker or lighter pigmentary change, recurrence, incomplete removal and infection.
Medical Necessity Information: It is in your best interest to select a reason for this procedure from the list below. All of these items fulfill various CMS LCD requirements except the new and changing color options.
Render Note In Bullet Format When Appropriate: No
Detail Level: Detailed
Medical Necessity Clause: This procedure was medically necessary because the lesions that were treated were:

## 2019-10-09 NOTE — PROCEDURE: MIPS QUALITY
Quality 474: Zoster Vaccination Status: Shingrix Vaccination not Administered or Previously Received, Reason not Otherwise Specified
Quality 226: Preventive Care And Screening: Tobacco Use: Screening And Cessation Intervention: Patient screened for tobacco and is a smoker AND received Cessation Counseling
Quality 110: Preventive Care And Screening: Influenza Immunization: Influenza Immunization previously received during influenza season
Quality 130: Documentation Of Current Medications In The Medical Record: Current Medications Documented
Quality 131: Pain Assessment And Follow-Up: Pain assessment using a standardized tool is documented as negative, no follow-up plan required
Detail Level: Detailed
Quality 431: Preventive Care And Screening: Unhealthy Alcohol Use - Screening: Patient screened for unhealthy alcohol use using a single question and scores less than 2 times per year

## 2019-10-10 ENCOUNTER — MYC MEDICAL ADVICE (OUTPATIENT)
Dept: INTERNAL MEDICINE | Facility: CLINIC | Age: 69
End: 2019-10-10

## 2019-10-10 NOTE — TELEPHONE ENCOUNTER
PCP please advise of MyChart message regarding ED and possible alternative medication    Radha SALAZAR RN, BSN, PHN

## 2019-10-31 ENCOUNTER — APPOINTMENT (OUTPATIENT)
Age: 69
Setting detail: DERMATOLOGY
End: 2019-11-04

## 2019-10-31 DIAGNOSIS — L57.0 ACTINIC KERATOSIS: ICD-10-CM

## 2019-10-31 DIAGNOSIS — Z71.89 OTHER SPECIFIED COUNSELING: ICD-10-CM

## 2019-10-31 PROBLEM — C44.619 BASAL CELL CARCINOMA OF SKIN OF LEFT UPPER LIMB, INCLUDING SHOULDER: Status: ACTIVE | Noted: 2019-10-31

## 2019-10-31 PROCEDURE — OTHER LIQUID NITROGEN: OTHER

## 2019-10-31 PROCEDURE — OTHER MIPS QUALITY: OTHER

## 2019-10-31 PROCEDURE — 17311 MOHS 1 STAGE H/N/HF/G: CPT

## 2019-10-31 PROCEDURE — OTHER COUNSELING: OTHER

## 2019-10-31 PROCEDURE — 17003 DESTRUCT PREMALG LES 2-14: CPT

## 2019-10-31 PROCEDURE — 17000 DESTRUCT PREMALG LESION: CPT

## 2019-10-31 PROCEDURE — OTHER MOHS SURGERY: OTHER

## 2019-10-31 ASSESSMENT — LOCATION ZONE DERM: LOCATION ZONE: FACE

## 2019-10-31 ASSESSMENT — LOCATION DETAILED DESCRIPTION DERM
LOCATION DETAILED: RIGHT CENTRAL ZYGOMA
LOCATION DETAILED: RIGHT CENTRAL MALAR CHEEK
LOCATION DETAILED: LEFT SUPERIOR CENTRAL MALAR CHEEK

## 2019-10-31 ASSESSMENT — LOCATION SIMPLE DESCRIPTION DERM
LOCATION SIMPLE: RIGHT ZYGOMA
LOCATION SIMPLE: LEFT CHEEK
LOCATION SIMPLE: RIGHT CHEEK

## 2019-10-31 NOTE — PROCEDURE: MOHS SURGERY
Rhomboid Transposition Flap Text: In order to preserve normal anatomic and functional relationships, a rhomboid transposition flap was deemed the most appropriate reconstructive procedure.  The beveled edges of the Mohs defect were excised with a #15 scalpel blade.    The flap was designed to fall along relaxed skin tension lines and/or free margins, incised, and elevated using blunt curved tissue scissors.  Hemostasis was achieved.  Interrupted buried vertical mattress sutures were employed to transpose and secure the flap in place.  Redundant cutaneous columns defined by the flap's movement were removed to the adipose layer using the triangulation technique and repaired in similar fashion.  Final epidermal approximation along the length of the flap was achieved using epidermal sutures.  The wound was stressed in various directions to ensure the adequacy of the closure and of hemostasis.  The flap edges appeared pink and well perfused.  Reconstruction was considered complete.

## 2019-10-31 NOTE — PROCEDURE: MOHS SURGERY
Transposition Flap Text: In order to preserve normal anatomic and functional relationships, a trannsposition flap was deemed the most appropriate reconstructive procedure.  The beveled edges of the Mohs defect were excised with a #15 scalpel blade.    The flap was designed to fall along relaxed skin tension lines and/or free margins, incised, and elevated using blunt curved tissue scissors.  Hemostasis was achieved.  Interrupted buried vertical mattress sutures were employed to transpose and secure the flap in place.  Redundant cutaneous columns defined by the flap's movement were removed to the adipose layer using the triangulation technique and repaired in similar fashion.  Final epidermal approximation along the length of the flap was achieved using epidermal sutures.  The wound was stressed in various directions to ensure the adequacy of the closure and of hemostasis.  The flap edges appeared pink and well perfused.  Reconstruction was considered complete.

## 2019-10-31 NOTE — PROCEDURE: LIQUID NITROGEN
Detail Level: Detailed
Render Note In Bullet Format When Appropriate: No
Post-Care Instructions: I reviewed with the patient in detail post-care instructions. Patient is to wear sunprotection, and avoid picking at any of the treated lesions. Pt may apply Vaseline to crusted or scabbing areas.
Duration Of Freeze Thaw-Cycle (Seconds): 10
Render Post-Care Instructions In Note?: yes
Consent: The patient's consent was obtained including but not limited to risks of crusting, scabbing, blistering, scarring, darker or lighter pigmentary change, recurrence, incomplete removal and infection.
Number Of Freeze-Thaw Cycles: 1 freeze-thaw cycle

## 2019-10-31 NOTE — PROCEDURE: MOHS SURGERY
Rhombic Flap Text: In order to preserve normal anatomic and functional relationships, a rhombic transposition flap was deemed the most appropriate reconstructive procedure.  The beveled edges of the Mohs defect were excised with a #15 scalpel blade.    The flap was designed to fall along relaxed skin tension lines and/or free margins, incised, and elevated using blunt curved tissue scissors.  Hemostasis was achieved.  Interrupted buried vertical mattress sutures were employed to transpose and secure the flap in place.  Redundant cutaneous columns defined by the flap's movement were removed to the adipose layer using the triangulation technique and repaired in similar fashion.  Final epidermal approximation along the length of the flap was achieved using epidermal sutures.  The wound was stressed in various directions to ensure the adequacy of the closure and of hemostasis.  The flap edges appeared pink and well perfused.  Reconstruction was considered complete.

## 2019-10-31 NOTE — PROCEDURE: MIPS QUALITY
Quality 226: Preventive Care And Screening: Tobacco Use: Screening And Cessation Intervention: Patient screened for tobacco and is a smoker AND received Cessation Counseling
Quality 474: Zoster Vaccination Status: Shingrix Vaccination not Administered or Previously Received, Reason not Otherwise Specified
Detail Level: Detailed
Quality 143: Oncology: Medical And Radiation- Pain Intensity Quantified: Pain severity quantified, no pain present
Quality 110: Preventive Care And Screening: Influenza Immunization: Influenza Immunization previously received during influenza season
Quality 431: Preventive Care And Screening: Unhealthy Alcohol Use - Screening: Patient screened for unhealthy alcohol use using a single question and scores less than 2 times per year
Quality 130: Documentation Of Current Medications In The Medical Record: Current Medications Documented

## 2019-10-31 NOTE — PROCEDURE: MOHS SURGERY
Body Location Override (Optional - Billing Will Still Be Based On Selected Body Map Location If Applicable): Left Dorsal Hand (2nd Ray)

## 2019-11-05 ENCOUNTER — APPOINTMENT (OUTPATIENT)
Age: 69
Setting detail: DERMATOLOGY
End: 2019-12-12

## 2019-11-05 DIAGNOSIS — Z48.817 ENCOUNTER FOR SURGICAL AFTERCARE FOLLOWING SURGERY ON THE SKIN AND SUBCUTANEOUS TISSUE: ICD-10-CM

## 2019-11-05 PROCEDURE — 99212 OFFICE O/P EST SF 10 MIN: CPT | Mod: 24

## 2019-11-05 PROCEDURE — OTHER DIAGNOSIS COMMENT: OTHER

## 2019-11-05 PROCEDURE — OTHER COUNSELING: OTHER

## 2019-11-05 PROCEDURE — OTHER ORDER TESTS: OTHER

## 2019-11-05 PROCEDURE — OTHER DRESSING CHANGE: OTHER

## 2019-11-05 ASSESSMENT — LOCATION DETAILED DESCRIPTION DERM: LOCATION DETAILED: LEFT RADIAL DORSAL HAND

## 2019-11-05 ASSESSMENT — LOCATION ZONE DERM: LOCATION ZONE: HAND

## 2019-11-05 ASSESSMENT — LOCATION SIMPLE DESCRIPTION DERM: LOCATION SIMPLE: LEFT HAND

## 2019-11-05 NOTE — PROCEDURE: DRESSING CHANGE
Add 81836 Cpt? (Important Note: In 2017 The Use Of 84160 Is Being Tracked By Cms To Determine Future Global Period Reimbursement For Global Periods): no
Wound Evaluated By: JAZMIN Sheikh NP-C and Vicky Guthrie MD
Detail Level: Detailed

## 2019-11-05 NOTE — PROCEDURE: COUNSELING
Detail Level: Detailed
Patient Specific Counseling (Will Not Stick From Patient To Patient): The patient now understands there will be an increase in drainage with activity and that it may take up to 1 month for the wound to completely heal.  Patient instructed to continue wound care as he has been dipping but to also include elevation, ice packs and moderate activity with his hands,  he has been cleared to perform physical activity and lifting but to modify activity should he begin to experience pain or discomfort.  The wound does not clinically appear to be infected.  A culture of the wound bed was sent to confirm no infection.

## 2019-11-08 ENCOUNTER — APPOINTMENT (OUTPATIENT)
Age: 69
Setting detail: DERMATOLOGY
End: 2019-12-15

## 2019-11-08 DIAGNOSIS — T814XXA OTHER POSTOPERATIVE INFECTION: ICD-10-CM

## 2019-11-08 DIAGNOSIS — K6811 OTHER POSTOPERATIVE INFECTION: ICD-10-CM

## 2019-11-08 PROBLEM — T81.41XA INFECTION FOLLOWING A PROCEDURE, SUPERFICIAL INCISIONAL SURGICAL SITE, INITIAL ENCOUNTER: Status: ACTIVE | Noted: 2019-11-08

## 2019-11-08 PROCEDURE — OTHER COUNSELING: OTHER

## 2019-11-08 PROCEDURE — OTHER PRESCRIPTION: OTHER

## 2019-11-08 RX ORDER — CIPROFLOXACIN 500 MG/1
TABLET, FILM COATED ORAL BID
Qty: 14 | Refills: 0 | Status: ERX | COMMUNITY
Start: 2019-11-08

## 2019-11-08 ASSESSMENT — LOCATION ZONE DERM: LOCATION ZONE: HAND

## 2019-11-08 ASSESSMENT — LOCATION DETAILED DESCRIPTION DERM: LOCATION DETAILED: LEFT RADIAL DORSAL HAND

## 2019-11-08 ASSESSMENT — LOCATION SIMPLE DESCRIPTION DERM: LOCATION SIMPLE: LEFT HAND

## 2019-11-15 ENCOUNTER — APPOINTMENT (OUTPATIENT)
Age: 69
Setting detail: DERMATOLOGY
End: 2019-11-16

## 2019-11-15 DIAGNOSIS — L98429 CHRONIC ULCER OF OTHER SPECIFIED SITES: ICD-10-CM

## 2019-11-15 DIAGNOSIS — Z71.89 OTHER SPECIFIED COUNSELING: ICD-10-CM

## 2019-11-15 DIAGNOSIS — L98419 CHRONIC ULCER OF OTHER SPECIFIED SITES: ICD-10-CM

## 2019-11-15 PROBLEM — L98.499 NON-PRESSURE CHRONIC ULCER OF SKIN OF OTHER SITES WITH UNSPECIFIED SEVERITY: Status: ACTIVE | Noted: 2019-11-15

## 2019-11-15 PROCEDURE — 99213 OFFICE O/P EST LOW 20 MIN: CPT

## 2019-11-15 PROCEDURE — OTHER MIPS QUALITY: OTHER

## 2019-11-15 PROCEDURE — OTHER POST-OP WOUND EVALUATION: OTHER

## 2019-11-15 PROCEDURE — OTHER COUNSELING: OTHER

## 2019-11-15 PROCEDURE — OTHER DIAGNOSIS COMMENT: OTHER

## 2019-11-15 ASSESSMENT — LOCATION ZONE DERM: LOCATION ZONE: HAND

## 2019-11-15 ASSESSMENT — LOCATION SIMPLE DESCRIPTION DERM: LOCATION SIMPLE: LEFT HAND

## 2019-11-15 ASSESSMENT — LOCATION DETAILED DESCRIPTION DERM: LOCATION DETAILED: LEFT RADIAL DORSAL HAND

## 2019-11-15 NOTE — PROCEDURE: DIAGNOSIS COMMENT
Comment: S/P Mohs Micrographic surgery for primary nodular basal cell carcinoma on the L upper dorsal hand 2nd ray, S/P 2nd intention on 10/31/2019\\nWound measurements: 1.9 x 1.7 cm\\n
Detail Level: Simple

## 2019-11-15 NOTE — PROCEDURE: MIPS QUALITY
Detail Level: Detailed
Quality 226: Preventive Care And Screening: Tobacco Use: Screening And Cessation Intervention: Patient screened for tobacco and is an ex-smoker
Quality 130: Documentation Of Current Medications In The Medical Record: Current Medications Documented
Quality 131: Pain Assessment And Follow-Up: Pain assessment using a standardized tool is documented as negative, no follow-up plan required

## 2019-11-15 NOTE — PROCEDURE: POST-OP WOUND EVALUATION
Patient To Follow-Up With?: our clinic
Wound Evaluated By (Optional): Vijaya Guthrie, DNP APRN NPC
Detail Level: Detailed
Follow Up Time Frame (Optional): days
Add 69065 Cpt? (Important Note: In 2017 The Use Of 02864 Is Being Tracked By Cms To Determine Future Global Period Reimbursement For Global Periods): no
Lymphadenopathy?: absent
Wound Diameter In Cm(Optional): 1.9
Follow Up Units (Optional): 7
Wound Crusting?: clean
Wound Edema?: mild
Wound Granulation?: early
Wound Color?: pink
Body Location Override (Optional): L upper dorsal hand 2nd ray

## 2019-11-22 ENCOUNTER — APPOINTMENT (OUTPATIENT)
Age: 69
Setting detail: DERMATOLOGY
End: 2019-12-17

## 2019-11-22 DIAGNOSIS — L98419 CHRONIC ULCER OF OTHER SPECIFIED SITES: ICD-10-CM

## 2019-11-22 DIAGNOSIS — Z71.89 OTHER SPECIFIED COUNSELING: ICD-10-CM

## 2019-11-22 DIAGNOSIS — L98429 CHRONIC ULCER OF OTHER SPECIFIED SITES: ICD-10-CM

## 2019-11-22 PROBLEM — L98.499 NON-PRESSURE CHRONIC ULCER OF SKIN OF OTHER SITES WITH UNSPECIFIED SEVERITY: Status: ACTIVE | Noted: 2019-11-22

## 2019-11-22 PROCEDURE — OTHER POST-OP WOUND EVALUATION: OTHER

## 2019-11-22 PROCEDURE — OTHER MIPS QUALITY: OTHER

## 2019-11-22 PROCEDURE — OTHER COUNSELING: OTHER

## 2019-11-22 PROCEDURE — OTHER DIAGNOSIS COMMENT: OTHER

## 2019-11-22 PROCEDURE — 99213 OFFICE O/P EST LOW 20 MIN: CPT

## 2019-11-22 ASSESSMENT — LOCATION DETAILED DESCRIPTION DERM: LOCATION DETAILED: LEFT RADIAL DORSAL HAND

## 2019-11-22 ASSESSMENT — LOCATION SIMPLE DESCRIPTION DERM: LOCATION SIMPLE: LEFT HAND

## 2019-11-22 ASSESSMENT — LOCATION ZONE DERM: LOCATION ZONE: HAND

## 2019-11-22 NOTE — PROCEDURE: DIAGNOSIS COMMENT
Detail Level: Simple
Comment: S/P Mohs Micrographic surgery for primary nodular basal cell carcinoma on the L upper dorsal hand 2nd ray, S/P 2nd intention on 10/31/2019\\nWound measurements: 1.6 x 1.2 cm\\n

## 2019-11-22 NOTE — PROCEDURE: POST-OP WOUND EVALUATION
Wound Edema?: mild
Body Location Override (Optional): L upper dorsal hand 2nd ray
Wound Color?: pink
Patient To Follow-Up With?: our clinic
Wound Granulation?: early
Wound Crusting?: clean
Add 43160 Cpt? (Important Note: In 2017 The Use Of 65715 Is Being Tracked By Cms To Determine Future Global Period Reimbursement For Global Periods): no
Wound Evaluated By (Optional): Vijaya Guthrie, DNP APRN NPC
Detail Level: Detailed
Wound Diameter In Cm(Optional): 1.6
Lymphadenopathy?: absent
Follow Up Units (Optional): 7
Follow Up Time Frame (Optional): days

## 2019-11-22 NOTE — PROCEDURE: MIPS QUALITY
Detail Level: Detailed
Quality 131: Pain Assessment And Follow-Up: Pain assessment using a standardized tool is documented as negative, no follow-up plan required
Quality 130: Documentation Of Current Medications In The Medical Record: Current Medications Documented
Quality 226: Preventive Care And Screening: Tobacco Use: Screening And Cessation Intervention: Patient screened for tobacco and is an ex-smoker

## 2019-11-25 ENCOUNTER — MYC MEDICAL ADVICE (OUTPATIENT)
Dept: INTERNAL MEDICINE | Facility: CLINIC | Age: 69
End: 2019-11-25

## 2019-11-25 NOTE — TELEPHONE ENCOUNTER
PCP please see eFlixt message with update on Erectile Dysfunction and further medication questions.     Radha HERNANDEZ, RN, PHN

## 2019-12-02 ENCOUNTER — OFFICE VISIT (OUTPATIENT)
Dept: INTERNAL MEDICINE | Facility: CLINIC | Age: 69
End: 2019-12-02
Payer: MEDICARE

## 2019-12-02 VITALS
BODY MASS INDEX: 27.77 KG/M2 | HEIGHT: 70 IN | DIASTOLIC BLOOD PRESSURE: 82 MMHG | RESPIRATION RATE: 14 BRPM | WEIGHT: 194 LBS | TEMPERATURE: 97.9 F | HEART RATE: 77 BPM | OXYGEN SATURATION: 99 % | SYSTOLIC BLOOD PRESSURE: 130 MMHG

## 2019-12-02 DIAGNOSIS — N52.9 IMPOTENCE OF ORGANIC ORIGIN: Primary | ICD-10-CM

## 2019-12-02 PROCEDURE — 84403 ASSAY OF TOTAL TESTOSTERONE: CPT | Performed by: INTERNAL MEDICINE

## 2019-12-02 PROCEDURE — 36415 COLL VENOUS BLD VENIPUNCTURE: CPT | Performed by: INTERNAL MEDICINE

## 2019-12-02 PROCEDURE — 99214 OFFICE O/P EST MOD 30 MIN: CPT | Performed by: INTERNAL MEDICINE

## 2019-12-02 RX ORDER — SILDENAFIL 100 MG/1
100 TABLET, FILM COATED ORAL DAILY PRN
Qty: 30 TABLET | Refills: 11 | Status: CANCELLED | OUTPATIENT
Start: 2019-12-02

## 2019-12-02 RX ORDER — TADALAFIL 20 MG/1
20 TABLET ORAL DAILY PRN
Qty: 30 TABLET | Refills: 0 | Status: SHIPPED | OUTPATIENT
Start: 2019-12-02 | End: 2019-12-02

## 2019-12-02 RX ORDER — TADALAFIL 20 MG/1
20 TABLET ORAL DAILY PRN
Qty: 30 TABLET | Refills: 0 | COMMUNITY
Start: 2019-12-02 | End: 2020-08-31

## 2019-12-02 ASSESSMENT — MIFFLIN-ST. JEOR: SCORE: 1646.23

## 2019-12-02 NOTE — PROGRESS NOTES
Subjective     Saji Morrison is a 69 year old male who presents to clinic today for the following health issues:    HPI     Patient here to discuss medication for ED. he has been on Viagra in the past.  He is also tried Levitra in the past.  His chart initially stated he was intolerant to Cialis but that is not the case and the patient informs me he is quite adamant about the fact that he is never tried Cialis.    He reports the Viagra was working well and he is receiving the brand name product but upon receiving the generic is noticed now that he has difficulty maintaining an erection at his adequate for intercourse.    He does not use any nitroglycerin products.    Patient Active Problem List   Diagnosis     Essential hypertension, benign     Impotence of organic origin     HYPERLIPIDEMIA LDL GOAL <130     ACP (advance care planning)     Ankylosing spondylitis of sacral region (H)     Past Surgical History:   Procedure Laterality Date     C NONSPECIFIC PROCEDURE  1967    ski accident L leg (torn ligaments)     COLONOSCOPY N/A 10/3/2018    Procedure: COLONOSCOPY;  colonoscopy;  Surgeon: Lia Cosme MD;  Location:  GI     LAPAROSCOPIC HERNIORRHAPHY INGUINAL BILATERAL Bilateral 12/5/2016    Procedure: LAPAROSCOPIC HERNIORRHAPHY INGUINAL BILATERAL;  Surgeon: Alec Johnson MD;  Location:  OR       Social History     Tobacco Use     Smoking status: Never Smoker     Smokeless tobacco: Never Used   Substance Use Topics     Alcohol use: Yes     Alcohol/week: 0.0 standard drinks     Comment: Occasionally.     Family History   Problem Relation Age of Onset     C.A.D. Father      Hypertension Father      Hypertension Mother      Hypertension Brother          Current Outpatient Medications   Medication Sig Dispense Refill     diclofenac (VOLTAREN) 75 MG EC tablet        folic acid (FOLVITE) 1 MG tablet Take 1 mg by mouth daily       inFLIXimab (REMICADE) 100 MG injection Dosing being done per his  "outside rheumatologist as scheduled 1 each 0     losartan (COZAAR) 100 MG tablet Take 1 tablet (100 mg) by mouth daily 90 tablet 3     sildenafil (VIAGRA) 100 MG tablet Take 1 tablet (100 mg) by mouth daily as needed (max. 100mg daily) at least 30 minutes before intercourse. 30 tablet 11     Allergies   Allergen Reactions     Cialis [Tadalafil]      Lisinopril      cough     Sertraline      Shakiness       Thiazide-Type Diuretics      PRINZIDE     BP Readings from Last 3 Encounters:   09/19/19 120/68   09/04/19 124/82   04/12/19 150/88    Wt Readings from Last 3 Encounters:   09/19/19 86.2 kg (190 lb)   09/04/19 85 kg (187 lb 8 oz)   04/12/19 85 kg (187 lb 6.4 oz)           Reviewed and updated as needed this visit by Provider       Review of Systems   ROS COMP: CONSTITUTIONAL: NEGATIVE for fever, chills, change in weight  ENT/MOUTH: NEGATIVE for ear, mouth and throat problems  RESP: NEGATIVE for significant cough or SOB  CV: NEGATIVE for chest pain, palpitations or peripheral edema  GI: NEGATIVE for nausea, abdominal pain, heartburn, or change in bowel habits  : NEGATIVE for frequency, dysuria, or hematuria  MUSCULOSKELETAL: NEGATIVE for significant arthralgias or myalgia  NEURO: NEGATIVE for weakness, dizziness or paresthesias  HEME: NEGATIVE for bleeding problems  PSYCHIATRIC: NEGATIVE for changes in mood or affect      Objective    /82   Pulse 77   Temp 97.9  F (36.6  C) (Oral)   Resp 14   Ht 1.778 m (5' 10\")   Wt 88 kg (194 lb)   SpO2 99%   BMI 27.84 kg/m    Body mass index is 27.84 kg/m .  Physical Exam   GENERAL: alert and no distress  EYES: Eyes grossly normal to inspection, PERRL and conjunctivae and sclerae normal  HENT: ear canals and TM's normal, nose and mouth without ulcers or lesions  NECK: no adenopathy, no asymmetry, masses, or scars and thyroid normal to palpation  RESP: lungs clear to auscultation - no rales, rhonchi or wheezes  CV: regular rate and rhythm, normal S1 S2, no S3 or " "S4, no murmur, click or rub, no peripheral edema and peripheral pulses strong.  MS: no gross musculoskeletal defects noted  PSYCH: mentation appears normal, affect normal/bright        Assessment & Plan     1. IMPOTENCE, ORGANIC ORIGN  Suggest stopping Viagra and trying Cialis 20 mGy as directed as needed erectile dysfunction with no nitroglycerin products recommended with use  - Testosterone, total  - UROLOGY ADULT REFERRAL  - tadalafil (CIALIS) 20 MG tablet; Take 1 tablet (20 mg) by mouth daily as needed (erectile dysfunction, max. 20mg daily) Do not use with NTG  Dispense: 30 tablet; Refill: 0     BMI:   Estimated body mass index is 27.26 kg/m  as calculated from the following:    Height as of 9/19/19: 1.778 m (5' 10\").    Weight as of 9/19/19: 86.2 kg (190 lb).     See Patient Instructions    Return for if symptoms recur or worsen.    Yared Gallardo MD  [facility]      "

## 2019-12-02 NOTE — LETTER
[facility]  [address] [address]  [phone]      12/4/2019       Saji Morrison  [address] [place] [address]        Dear Saji,    This is a quick follow-up letter to let you know that your testosterone level did return normal as expected.    Sincerely,      Yared Gallardo MD  Internal Medicine

## 2019-12-04 LAB — TESTOST SERPL-MCNC: 489 NG/DL (ref 240–950)

## 2020-02-04 ENCOUNTER — TELEPHONE (OUTPATIENT)
Dept: INTERNAL MEDICINE | Facility: CLINIC | Age: 70
End: 2020-02-04

## 2020-02-04 DIAGNOSIS — I10 ESSENTIAL HYPERTENSION, BENIGN: Primary | ICD-10-CM

## 2020-02-04 RX ORDER — IRBESARTAN 150 MG/1
150 TABLET ORAL AT BEDTIME
Qty: 90 TABLET | Refills: 0 | Status: SHIPPED | OUTPATIENT
Start: 2020-02-04 | End: 2020-05-01

## 2020-02-04 NOTE — TELEPHONE ENCOUNTER
PCP please advise if willing to switch medication d/t slow production or send script to alternate pharmacy.     Radha CHILELN, RN, PHN

## 2020-02-04 NOTE — TELEPHONE ENCOUNTER
We can switch the medication although again I need to know what alternatives are covered in order to switch.

## 2020-02-04 NOTE — TELEPHONE ENCOUNTER
Per [facility] protocol you may try sending over then we can see if patient has issues with formulary plan:     *Irbesartan (Avapro)   **Olmesartan (Benicar)   Candesartan (Atacand)   Eprosartan (Teveten)  Telmisartan (Micardis)  Valsartan (Diovan)   Azilsartan (Edarbi)      * Irbesartan 75mg dose is reserved for patients with volume or salt depletion and children <12  ** Olmesartan (Benicar) lowers the DBP & SBP approximately 3- 4 mmHg more than the corresponding dose of the other ARBs      Radha CHILELN, RN, PHN

## 2020-02-04 NOTE — TELEPHONE ENCOUNTER
losartan (COZAAR) 100 MG tablet is on back order, please send an alternative medication to pharmacy.

## 2020-02-20 NOTE — TELEPHONE ENCOUNTER
Prescription approved per [facility] Refill Protocol.     She will need to be seen to discuss breakthrough bleeding.  I think she is followed by GYN dr. Fallon Avila, so would need to schedule appt

## 2020-03-06 ENCOUNTER — OFFICE VISIT (OUTPATIENT)
Dept: UROLOGY | Facility: CLINIC | Age: 70
End: 2020-03-06
Attending: INTERNAL MEDICINE
Payer: MEDICARE

## 2020-03-06 VITALS
BODY MASS INDEX: 26.2 KG/M2 | WEIGHT: 183 LBS | DIASTOLIC BLOOD PRESSURE: 82 MMHG | HEIGHT: 70 IN | SYSTOLIC BLOOD PRESSURE: 124 MMHG

## 2020-03-06 DIAGNOSIS — N52.9 ERECTILE DYSFUNCTION, UNSPECIFIED ERECTILE DYSFUNCTION TYPE: Primary | ICD-10-CM

## 2020-03-06 PROCEDURE — 99204 OFFICE O/P NEW MOD 45 MIN: CPT | Performed by: UROLOGY

## 2020-03-06 RX ORDER — TADALAFIL 20 MG/1
20 TABLET ORAL DAILY PRN
Qty: 18 TABLET | Refills: 11 | Status: SHIPPED | OUTPATIENT
Start: 2020-03-06 | End: 2020-05-14

## 2020-03-06 ASSESSMENT — MIFFLIN-ST. JEOR: SCORE: 1596.33

## 2020-03-06 NOTE — NURSING NOTE
Chief Complaint   Patient presents with     Erectile Dysfunction     discuss options for ED     Jessica Velez, KARTIK    
No other interventions needed as per MD ,other than juice and breakfast

## 2020-03-06 NOTE — LETTER
3/6/2020       RE: Saji Morrison  [address] [place] [address]     Dear Colleague,    Thank you for referring your patient, Saji Morrison, to the [facility] [place] at [facility]. Please see a copy of my visit note below.    Urology Consult History and Physical  [facility]   Name: Saji Morrison    MRN: [id number]   YOB: 1950       We were asked to see Saji Morrison at the request of Dr. Gallardo for evaluation and treatment of Erectile dysfunction.        Chief Complaint:   Erectile dysfunction     History is obtained from the patient            History of Present Illness:   Saji Morrison is a 69 year old male who is being seen for evaluation of Erectile dysfunction     Was using sildenafil 100mg (Viagra) which was working well until about late summer 2019.  He is having issues maintaining the erection  Saw Dr. Gallardo in December and was switched to Cialis 20mg  He only took this once and stated that it worked fairly well, however wanted to keep the appointment today to discuss and hear about all of his options.  His libido remains intact with no symptoms of low testosterone               Past Medical History:     Past Medical History:   Diagnosis Date     Ankylosing spondylitis of sacral region (H)      BENIGN HYPERTENSION 9/29/2003     Diaphragmatic hernia without mention of obstruction or gangrene 1989     Hyperlipidemia LDL goal <130 10/31/2010     Impotence of organic origin      IMPOTENCE, ORGANIC ORIGN 11/26/2003     Infectious mononucleosis 1960            Past Surgical History:     Past Surgical History:   Procedure Laterality Date     C NONSPECIFIC PROCEDURE  1967    ski accident L leg (torn ligaments)     COLONOSCOPY N/A 10/3/2018    Procedure: COLONOSCOPY;  colonoscopy;  Surgeon: Lia Cosme MD;  Location:  GI     LAPAROSCOPIC HERNIORRHAPHY INGUINAL BILATERAL  "Bilateral 12/5/2016    Procedure: LAPAROSCOPIC HERNIORRHAPHY INGUINAL BILATERAL;  Surgeon: Alec Johnson MD;  Location:  OR            Social History:     Social History     Tobacco Use     Smoking status: Never Smoker     Smokeless tobacco: Never Used   Substance Use Topics     Alcohol use: Yes     Alcohol/week: 0.0 standard drinks     Comment: Occasionally.       History   Smoking Status     Never Smoker   Smokeless Tobacco     Never Used            Family History:     Family History   Problem Relation Age of Onset     C.A.D. Father      Hypertension Father      Hypertension Mother      Hypertension Brother               Allergies:     Allergies   Allergen Reactions     Lisinopril      cough     Sertraline      Shakiness       Thiazide-Type Diuretics      PRINZIDE            Medications:     Current Outpatient Medications   Medication Sig     inFLIXimab (REMICADE) 100 MG injection Dosing being done per his outside rheumatologist as scheduled     irbesartan (AVAPRO) 150 MG tablet Take 1 tablet (150 mg) by mouth At Bedtime     tadalafil (CIALIS) 20 MG tablet Take 1 tablet (20 mg) by mouth daily as needed (erectile dysfunction, max. 20mg daily) Do not use with NTG     diclofenac (VOLTAREN) 75 MG EC tablet      No current facility-administered medications for this visit.              Review of Systems:     Skin: negative  Eyes: negative  Ears/Nose/Throat: negative  Respiratory: No shortness of breath, dyspnea on exertion, cough, or hemoptysis  Cardiovascular: negative  Gastrointestinal: negative  Genitourinary: as above  Musculoskeletal: negative  Neurologic: negative  Psychiatric: negative  Hematologic/Lymphatic/Immunologic: negative  Endocrine: negative          Physical Exam:     Patient Vitals for the past 24 hrs:   BP Height Weight   03/06/20 1435 124/82 1.778 m (5' 10\") 83 kg (183 lb)     Body mass index is 26.26 kg/m .     General: age-appropriate appearing male in NAD  HEENT: Head AT/NC, EOMI, CN " Grossly intact  Lungs: no respiratory distress, or pursed lip breathing  Heart: No obvious jugular venous distension present  Back: no bony midline tenderness, no CVAT bilaterally.  Abdomen: soft, non-distended, non-tender. No organomegaly  Lymph: no palpable inguinal lymphadenopathy.  LE: no edema.   Musculoskeltal: extremities normal, no peripheral edema  Skin: no suspicious lesions or rashes  Neuro: Alert, oriented, speech and mentation normal;  moving all 4 extremities equally.  Psych: affect and mood normal          Data:   All laboratory data reviewed:    UA RESULTS:  Recent Labs   Lab Test 07/02/18  1627   COLOR Yellow   APPEARANCE Clear   URINEGLC Negative   URINEBILI Small*   URINEKETONE Negative   SG >1.030   UBLD Large*   URINEPH 5.5   PROTEIN 100*   UROBILINOGEN 2.0*   NITRITE Negative   LEUKEST Negative   RBCU 25-50*   WBCU 0 - 5      PSA   Date Value Ref Range Status   09/04/2019 2.19 0 - 4 ug/L Final     Comment:     Assay Method:  Chemiluminescence using Siemens Vista analyzer      Lab Results   Component Value Date    CR 0.81 09/04/2019             Impression and Plan:   Impression:   69-year-old man with erectile dysfunction      Plan:   Erectile dysfunction  -We discussed the pathophysiology of erectile dysfunction and that this is very common process in the aging adult male  -We discussed that there is no need for further imaging studies at this time  -We discussed main treatment options with good efficacy for treatment of erectile dysfunction include oral medications with PDE 5 inhibitors, intracavernosal injections, and Inflatable Penile Prosthesis (IPP)   -For PDE 5 inhibitors we discussed the proper use, mechanism of action, and various dosing options.  -Given that he just started on Cialis 20 mg as needed, with 1 use with fairly good response, we discussed that he should continue on this at this time.  I provided him with a new prescription  -Should this provide in adequate results, we  discussed other options would be a trial of daily Cialis, another PDE 5 inhibitor such as Stendra or consideration of intracavernosal injections  -He will communicate on his progress in the next few months via my chart.     Thank you for the kind consultation.    Time spent: 30 minutes of which >50% was spent counseling.    Haseeb Nazario MD   Urology  [facility]  [facility] Phone: [phone]  [facility] Phone: [phone]

## 2020-03-06 NOTE — PROGRESS NOTES
Urology Consult History and Physical  [facility]   Name: Saji Morrison    MRN: [id number]   YOB: 1950       We were asked to see Saji Morrison at the request of Dr. Gallardo for evaluation and treatment of Erectile dysfunction.        Chief Complaint:   Erectile dysfunction     History is obtained from the patient            History of Present Illness:   Saji Morrison is a 69 year old male who is being seen for evaluation of Erectile dysfunction     Was using sildenafil 100mg (Viagra) which was working well until about late summer 2019.  He is having issues maintaining the erection  Saw Dr. Gallardo in December and was switched to Cialis 20mg  He only took this once and stated that it worked fairly well, however wanted to keep the appointment today to discuss and hear about all of his options.  His libido remains intact with no symptoms of low testosterone               Past Medical History:     Past Medical History:   Diagnosis Date     Ankylosing spondylitis of sacral region (H)      BENIGN HYPERTENSION 9/29/2003     Diaphragmatic hernia without mention of obstruction or gangrene 1989     Hyperlipidemia LDL goal <130 10/31/2010     Impotence of organic origin      IMPOTENCE, ORGANIC ORIGN 11/26/2003     Infectious mononucleosis 1960            Past Surgical History:     Past Surgical History:   Procedure Laterality Date     C NONSPECIFIC PROCEDURE  1967    ski accident L leg (torn ligaments)     COLONOSCOPY N/A 10/3/2018    Procedure: COLONOSCOPY;  colonoscopy;  Surgeon: Lia Cosme MD;  Location:  GI     LAPAROSCOPIC HERNIORRHAPHY INGUINAL BILATERAL Bilateral 12/5/2016    Procedure: LAPAROSCOPIC HERNIORRHAPHY INGUINAL BILATERAL;  Surgeon: Alec Johnson MD;  Location:  OR            Social History:     Social History     Tobacco Use     Smoking status: Never Smoker     Smokeless tobacco: Never Used   Substance Use Topics     Alcohol use: Yes     Alcohol/week:  "0.0 standard drinks     Comment: Occasionally.       History   Smoking Status     Never Smoker   Smokeless Tobacco     Never Used            Family History:     Family History   Problem Relation Age of Onset     C.A.D. Father      Hypertension Father      Hypertension Mother      Hypertension Brother               Allergies:     Allergies   Allergen Reactions     Lisinopril      cough     Sertraline      Shakiness       Thiazide-Type Diuretics      PRINZIDE            Medications:     Current Outpatient Medications   Medication Sig     inFLIXimab (REMICADE) 100 MG injection Dosing being done per his outside rheumatologist as scheduled     irbesartan (AVAPRO) 150 MG tablet Take 1 tablet (150 mg) by mouth At Bedtime     tadalafil (CIALIS) 20 MG tablet Take 1 tablet (20 mg) by mouth daily as needed (erectile dysfunction, max. 20mg daily) Do not use with NTG     diclofenac (VOLTAREN) 75 MG EC tablet      No current facility-administered medications for this visit.              Review of Systems:     Skin: negative  Eyes: negative  Ears/Nose/Throat: negative  Respiratory: No shortness of breath, dyspnea on exertion, cough, or hemoptysis  Cardiovascular: negative  Gastrointestinal: negative  Genitourinary: as above  Musculoskeletal: negative  Neurologic: negative  Psychiatric: negative  Hematologic/Lymphatic/Immunologic: negative  Endocrine: negative          Physical Exam:     Patient Vitals for the past 24 hrs:   BP Height Weight   03/06/20 1435 124/82 1.778 m (5' 10\") 83 kg (183 lb)     Body mass index is 26.26 kg/m .     General: age-appropriate appearing male in NAD  HEENT: Head AT/NC, EOMI, CN Grossly intact  Lungs: no respiratory distress, or pursed lip breathing  Heart: No obvious jugular venous distension present  Back: no bony midline tenderness, no CVAT bilaterally.  Abdomen: soft, non-distended, non-tender. No organomegaly  Lymph: no palpable inguinal lymphadenopathy.  LE: no edema.   Musculoskeltal: " extremities normal, no peripheral edema  Skin: no suspicious lesions or rashes  Neuro: Alert, oriented, speech and mentation normal;  moving all 4 extremities equally.  Psych: affect and mood normal          Data:   All laboratory data reviewed:    UA RESULTS:  Recent Labs   Lab Test 07/02/18  1627   COLOR Yellow   APPEARANCE Clear   URINEGLC Negative   URINEBILI Small*   URINEKETONE Negative   SG >1.030   UBLD Large*   URINEPH 5.5   PROTEIN 100*   UROBILINOGEN 2.0*   NITRITE Negative   LEUKEST Negative   RBCU 25-50*   WBCU 0 - 5      PSA   Date Value Ref Range Status   09/04/2019 2.19 0 - 4 ug/L Final     Comment:     Assay Method:  Chemiluminescence using Siemens Vista analyzer      Lab Results   Component Value Date    CR 0.81 09/04/2019             Impression and Plan:   Impression:   69-year-old man with erectile dysfunction      Plan:   Erectile dysfunction  -We discussed the pathophysiology of erectile dysfunction and that this is very common process in the aging adult male  -We discussed that there is no need for further imaging studies at this time  -We discussed main treatment options with good efficacy for treatment of erectile dysfunction include oral medications with PDE 5 inhibitors, intracavernosal injections, and Inflatable Penile Prosthesis (IPP)   -For PDE 5 inhibitors we discussed the proper use, mechanism of action, and various dosing options.  -Given that he just started on Cialis 20 mg as needed, with 1 use with fairly good response, we discussed that he should continue on this at this time.  I provided him with a new prescription  -Should this provide in adequate results, we discussed other options would be a trial of daily Cialis, another PDE 5 inhibitor such as Stendra or consideration of intracavernosal injections  -He will communicate on his progress in the next few months via my chart.     Thank you for the kind consultation.    Time spent: 30 minutes of which >50% was spent  counseling.    aHseeb Nazario MD   Urology  [facility]  [facility] Phone: [phone]  [facility] Phone: [phone]

## 2020-03-06 NOTE — PATIENT INSTRUCTIONS
Discussion today:  - We discussed treatment options for Erectile dysfunction   - Pills (PDE-5i): Viagra, Cialis, Levitra, or Stendra (avanafil)  - intracavernosal injections   - Inflatable Penile Prosthesis (IPP)     Haseeb Nazario M.D.

## 2020-03-12 ENCOUNTER — MYC MEDICAL ADVICE (OUTPATIENT)
Dept: INTERNAL MEDICINE | Facility: CLINIC | Age: 70
End: 2020-03-12

## 2020-05-01 DIAGNOSIS — I10 ESSENTIAL HYPERTENSION, BENIGN: ICD-10-CM

## 2020-05-01 RX ORDER — IRBESARTAN 150 MG/1
TABLET ORAL
Qty: 90 TABLET | Refills: 2 | Status: SHIPPED | OUTPATIENT
Start: 2020-05-01 | End: 2021-05-10

## 2020-05-14 DIAGNOSIS — N52.9 ERECTILE DYSFUNCTION, UNSPECIFIED ERECTILE DYSFUNCTION TYPE: ICD-10-CM

## 2020-05-14 RX ORDER — TADALAFIL 20 MG/1
TABLET ORAL
Qty: 30 TABLET | Refills: 7 | Status: SHIPPED | OUTPATIENT
Start: 2020-05-14 | End: 2020-12-10

## 2020-05-20 ENCOUNTER — APPOINTMENT (OUTPATIENT)
Age: 70
Setting detail: DERMATOLOGY
End: 2020-05-29

## 2020-05-20 DIAGNOSIS — L82.1 OTHER SEBORRHEIC KERATOSIS: ICD-10-CM

## 2020-05-20 DIAGNOSIS — L57.0 ACTINIC KERATOSIS: ICD-10-CM

## 2020-05-20 DIAGNOSIS — L81.4 OTHER MELANIN HYPERPIGMENTATION: ICD-10-CM

## 2020-05-20 DIAGNOSIS — D18.0 HEMANGIOMA: ICD-10-CM

## 2020-05-20 DIAGNOSIS — D485 NEOPLASM OF UNCERTAIN BEHAVIOR OF SKIN: ICD-10-CM

## 2020-05-20 DIAGNOSIS — D22 MELANOCYTIC NEVI: ICD-10-CM

## 2020-05-20 PROBLEM — D18.01 HEMANGIOMA OF SKIN AND SUBCUTANEOUS TISSUE: Status: ACTIVE | Noted: 2020-05-20

## 2020-05-20 PROBLEM — D48.5 NEOPLASM OF UNCERTAIN BEHAVIOR OF SKIN: Status: ACTIVE | Noted: 2020-05-20

## 2020-05-20 PROBLEM — D22.5 MELANOCYTIC NEVI OF TRUNK: Status: ACTIVE | Noted: 2020-05-20

## 2020-05-20 PROCEDURE — OTHER MIPS QUALITY: OTHER

## 2020-05-20 PROCEDURE — OTHER COUNSELING: OTHER

## 2020-05-20 PROCEDURE — 17003 DESTRUCT PREMALG LES 2-14: CPT

## 2020-05-20 PROCEDURE — 99214 OFFICE O/P EST MOD 30 MIN: CPT | Mod: 25

## 2020-05-20 PROCEDURE — 11102 TANGNTL BX SKIN SINGLE LES: CPT

## 2020-05-20 PROCEDURE — OTHER BIOPSY BY SHAVE METHOD: OTHER

## 2020-05-20 PROCEDURE — 17000 DESTRUCT PREMALG LESION: CPT | Mod: 59

## 2020-05-20 PROCEDURE — OTHER LIQUID NITROGEN: OTHER

## 2020-05-20 ASSESSMENT — LOCATION DETAILED DESCRIPTION DERM
LOCATION DETAILED: RIGHT MEDIAL UPPER BACK
LOCATION DETAILED: RIGHT SUPERIOR FOREHEAD
LOCATION DETAILED: RIGHT LATERAL FOREHEAD
LOCATION DETAILED: RIGHT FOREHEAD
LOCATION DETAILED: LEFT SUPERIOR FOREHEAD
LOCATION DETAILED: RIGHT INFERIOR FOREHEAD
LOCATION DETAILED: LEFT MEDIAL FOREHEAD
LOCATION DETAILED: LEFT LATERAL INFERIOR CHEST
LOCATION DETAILED: LEFT SUPERIOR MEDIAL UPPER BACK
LOCATION DETAILED: LEFT INFERIOR FOREHEAD
LOCATION DETAILED: RIGHT INFERIOR MEDIAL UPPER BACK

## 2020-05-20 ASSESSMENT — LOCATION SIMPLE DESCRIPTION DERM
LOCATION SIMPLE: RIGHT UPPER BACK
LOCATION SIMPLE: RIGHT FOREHEAD
LOCATION SIMPLE: CHEST
LOCATION SIMPLE: LEFT FOREHEAD
LOCATION SIMPLE: LEFT UPPER BACK

## 2020-05-20 ASSESSMENT — LOCATION ZONE DERM
LOCATION ZONE: TRUNK
LOCATION ZONE: FACE

## 2020-05-20 NOTE — PROCEDURE: MIPS QUALITY
Detail Level: Detailed
Quality 111:Pneumonia Vaccination Status For Older Adults: Pneumococcal Vaccination Previously Received
Quality 130: Documentation Of Current Medications In The Medical Record: Current Medications Documented
Quality 110: Preventive Care And Screening: Influenza Immunization: Influenza Immunization Administered during Influenza season
Quality 431: Preventive Care And Screening: Unhealthy Alcohol Use - Screening: Patient screened for unhealthy alcohol use using a single question and scores less than 2 times per year
Quality 226: Preventive Care And Screening: Tobacco Use: Screening And Cessation Intervention: Patient screened for tobacco use and is an ex/non-smoker
Quality 265: Biopsy Follow-Up: Biopsy results reviewed, communicated, tracked, and documented

## 2020-05-20 NOTE — PROCEDURE: BIOPSY BY SHAVE METHOD
Electrodesiccation Text: The wound bed was treated with electrodesiccation after the biopsy was performed.
Size Of Lesion In Cm: 1.7
Path Notes (To The Dermatopathologist): Please check margins
Bill 90973 For Specimen Handling/Conveyance To Laboratory?: no
Electrodesiccation And Curettage Text: The wound bed was treated with electrodesiccation and curettage after the biopsy was performed.
Biopsy Type: H and E
Notification Instructions: Patient will be notified of biopsy results. However, patient instructed to call the office if not contacted within 2 weeks.
Body Location Override (Optional - Billing Will Still Be Based On Selected Body Map Location If Applicable): Left Upper Chest
Depth Of Biopsy: dermis
Silver Nitrate Text: The wound bed was treated with silver nitrate after the biopsy was performed.
Anesthesia Volume In Cc (Will Not Render If 0): 0.5
Additional Anesthesia Volume In Cc (Will Not Render If 0): 0
Hemostasis: Aluminum Chloride
Validate Note Data (See Information Below): Yes
Anesthesia Type: 1% lidocaine with epinephrine and a 1:10 solution of 8.4% sodium bicarbonate
Biopsy Method: Personna blade
Post-Care Instructions: I reviewed with the patient in detail post-care instructions. Patient is to keep the biopsy site dry overnight, and then apply bacitracin twice daily until healed. Patient may apply hydrogen peroxide soaks to remove any crusting.
Curettage Text: The wound bed was treated with curettage after the biopsy was performed.
Dressing: bandage
X Size Of Lesion In Cm: 1.2
Type Of Destruction Used: Curettage
Wound Care: Petrolatum
Cryotherapy Text: The wound bed was treated with cryotherapy after the biopsy was performed.
Information: Selecting Yes will display possible errors in your note based on the variables you have selected. This validation is only offered as a suggestion for you. PLEASE NOTE THAT THE VALIDATION TEXT WILL BE REMOVED WHEN YOU FINALIZE YOUR NOTE. IF YOU WANT TO FAX A PRELIMINARY NOTE YOU WILL NEED TO TOGGLE THIS TO 'NO' IF YOU DO NOT WANT IT IN YOUR FAXED NOTE.
Billing Type: Third-Party Bill
Consent: Written consent was obtained and risks were reviewed including but not limited to scarring, infection, bleeding, scabbing, incomplete removal, nerve damage and allergy to anesthesia.
Detail Level: Detailed

## 2020-05-20 NOTE — PROCEDURE: LIQUID NITROGEN
Render Note In Bullet Format When Appropriate: No
Post-Care Instructions: I reviewed with the patient in detail post-care instructions. Patient is to wear sunprotection, and avoid picking at any of the treated lesions. Pt may apply Vaseline to crusted or scabbing areas.
Consent: The patient's consent was obtained including but not limited to risks of crusting, scabbing, blistering, scarring, darker or lighter pigmentary change, recurrence, incomplete removal and infection.
Detail Level: Detailed
Number Of Freeze-Thaw Cycles: 1 freeze-thaw cycle
Duration Of Freeze Thaw-Cycle (Seconds): 10

## 2020-06-09 ENCOUNTER — APPOINTMENT (OUTPATIENT)
Age: 70
Setting detail: DERMATOLOGY
End: 2020-06-15

## 2020-06-09 PROBLEM — C44.519 BASAL CELL CARCINOMA OF SKIN OF OTHER PART OF TRUNK: Status: ACTIVE | Noted: 2020-06-09

## 2020-06-09 PROCEDURE — 17263 DSTRJ MAL LES T/A/L 2.1-3.0: CPT

## 2020-06-09 PROCEDURE — OTHER MIPS QUALITY: OTHER

## 2020-06-09 PROCEDURE — OTHER COUNSELING: OTHER

## 2020-06-09 PROCEDURE — OTHER CURETTAGE AND DESTRUCTION: OTHER

## 2020-06-09 PROCEDURE — OTHER DIAGNOSIS COMMENT: OTHER

## 2020-06-09 NOTE — PROCEDURE: MIPS QUALITY
Quality 111:Pneumonia Vaccination Status For Older Adults: Pneumococcal Vaccination Previously Received
Quality 110: Preventive Care And Screening: Influenza Immunization: Influenza Immunization Administered during Influenza season
Quality 431: Preventive Care And Screening: Unhealthy Alcohol Use - Screening: Patient screened for unhealthy alcohol use using a single question and scores less than 2 times per year
Quality 226: Preventive Care And Screening: Tobacco Use: Screening And Cessation Intervention: Patient screened for tobacco use and is an ex/non-smoker
Quality 130: Documentation Of Current Medications In The Medical Record: Current Medications Documented
Detail Level: Detailed

## 2020-06-09 NOTE — PROCEDURE: DIAGNOSIS COMMENT
Comment: Pre ED&C measurement : 1.8 x 1.3 cm\\nPost ED&C measurement: 2.4 x 1.9 cm
Detail Level: Simple

## 2020-06-09 NOTE — PROCEDURE: CURETTAGE AND DESTRUCTION
Bill For Surgical Tray: no
Total Volume (Ccs): 1
Cautery Type: electrodesiccation
What Was Performed First?: Curettage
Body Location Override (Optional - Billing Will Still Be Based On Selected Body Map Location If Applicable): Left Upper Chest
Size Of Lesion In Cm: 1.8
Consent was obtained from the patient. The risks, benefits and alternatives to therapy were discussed in detail. Specifically, the risks of infection, scarring, bleeding, prolonged wound healing, nerve injury, incomplete removal, allergy to anesthesia and recurrence were addressed. Alternatives to ED&C, such as: surgical removal and XRT were also discussed.  Prior to the procedure, the treatment site was clearly identified and confirmed by the patient. All components of Universal Protocol/PAUSE Rule completed.
Detail Level: Detailed
Post-Care Instructions: I reviewed with the patient in detail post-care instructions. Patient is to keep the area dry for 48 hours, and not to engage in any swimming until the area is healed. Should the patient develop any fevers, chills, bleeding, severe pain patient will contact the office immediately.
Size Of Lesion After Curettage: 0
Anesthesia Type: 1% lidocaine with epinephrine
Anesthesia Volume In Cc: 2
Number Of Curettages: 3
Bill As A Line Item Or As Units: Line Item
Additional Information: (Optional): The wound was cleaned, and a pressure dressing was applied.  The patient received detailed post-op instructions.

## 2020-08-31 ENCOUNTER — OFFICE VISIT (OUTPATIENT)
Dept: INTERNAL MEDICINE | Facility: CLINIC | Age: 70
End: 2020-08-31
Payer: MEDICARE

## 2020-08-31 VITALS
DIASTOLIC BLOOD PRESSURE: 82 MMHG | BODY MASS INDEX: 26.8 KG/M2 | RESPIRATION RATE: 15 BRPM | HEART RATE: 59 BPM | HEIGHT: 70 IN | TEMPERATURE: 97.7 F | OXYGEN SATURATION: 97 % | WEIGHT: 187.2 LBS | SYSTOLIC BLOOD PRESSURE: 130 MMHG

## 2020-08-31 DIAGNOSIS — I10 ESSENTIAL HYPERTENSION, BENIGN: ICD-10-CM

## 2020-08-31 DIAGNOSIS — M25.571 ARTHRALGIA OF RIGHT FOOT: Primary | ICD-10-CM

## 2020-08-31 PROCEDURE — 99214 OFFICE O/P EST MOD 30 MIN: CPT | Performed by: INTERNAL MEDICINE

## 2020-08-31 ASSESSMENT — MIFFLIN-ST. JEOR: SCORE: 1610.38

## 2020-08-31 NOTE — PROGRESS NOTES
"Subjective     Saji Morrison is a 70 year old male who presents to clinic today for the following health issues:    HPI     ED/UC Followup:    Facility:  [facility] - see care everywhere   Date of visit: 8/19/20  Reason for visit: Swelling of right foot, hypertension   Current Status: No change in swelling. Patient completed 3 days of lasix with no change symptoms.      Seen in urgent care after RV trip.  Noted negative Xray and Venous US at outside site 8/19/20.  Noted normal uric acid level.  No obvious precipitating event or trauma.    Review of Systems   CONSTITUTIONAL: NEGATIVE for fever, chills, change in weight  ENT/MOUTH: NEGATIVE for ear, mouth and throat problems  RESP: NEGATIVE for significant cough or SOB  GI: NEGATIVE for nausea, abdominal pain, heartburn, or change in bowel habits  : NEGATIVE for frequency, dysuria, or hematuria  NEURO: NEGATIVE for weakness, dizziness or paresthesias  HEME: NEGATIVE for bleeding problems  PSYCHIATRIC: NEGATIVE for changes in mood or affect      Objective    /82   Pulse 59   Temp 97.7  F (36.5  C) (Temporal)   Resp 15   Ht 1.778 m (5' 10\")   Wt 84.9 kg (187 lb 3.2 oz)   SpO2 97%   BMI 26.86 kg/m    There is no height or weight on file to calculate BMI.  Physical Exam   GENERAL: alert and no distress  EYES: Eyes grossly normal to inspection, PERRL and conjunctivae and sclerae normal  HENT: ear canals and TM's normal, nose and mouth without ulcers or lesions  NECK: no adenopathy, no asymmetry, masses, or scars and thyroid normal to palpation  RESP: lungs clear to auscultation - no rales, rhonchi or wheezes  CV: regular rate and rhythm, normal S1 S2, no S3 or S4, no click or rub, noted mild  peripheral edema and peripheral pulses strong  MS: Mild tenderness right ankle joint noted at dorsum of foot and also medial and lateral malleolus.  There is very mild edema noted that is not pitting.  This does not go beyond the ankle and " "appears to be located primarily in the foot..  NEURO: Normal strength and tone, mentation intact and speech normal  PSYCH: mentation appears normal, affect normal/bright        Assessment & Plan     Arthralgia of right foot  Atypical presentation in the setting of normal uric acid, negative x-ray as well as negative venous ultrasound.  Symptoms not consistent with DVT despite risk factors.  Question inflammatory joint process, pseudogout versus atypical presentation for gout.  Consider trial of Medrol Dosepak versus Indocin.  Patient is already hooked up with a rheumatologist that he sees on a regular basis and I suggested he contact him first before we give him a Medrol Dosepak and he will my chart message me    Essential hypertension, benign  Stable on therapy continue with current medical management.      BMI:   Estimated body mass index is 26.26 kg/m  as calculated from the following:    Height as of 3/6/20: 1.778 m (5' 10\").    Weight as of 3/6/20: 83 kg (183 lb).       See Patient Instructions    Return for if symptoms recur or worsen, follow-up [facility].    Yared Gallardo MD  [facility]    25 minutes spent with this patient, face to face, discussing treatment options for listed problems above as well as side effects of appropriate medications.  Counseling time extended beyond 50% of the clinic visit.  Medication dosing, treatment plan and follow-up were discussed. Also reviewed need for primary care testing for patient.     "

## 2020-09-01 ENCOUNTER — MYC MEDICAL ADVICE (OUTPATIENT)
Dept: INTERNAL MEDICINE | Facility: CLINIC | Age: 70
End: 2020-09-01

## 2020-10-30 ENCOUNTER — APPOINTMENT (OUTPATIENT)
Dept: URBAN - METROPOLITAN AREA CLINIC 255 | Age: 70
Setting detail: DERMATOLOGY
End: 2020-10-31

## 2020-10-30 DIAGNOSIS — D485 NEOPLASM OF UNCERTAIN BEHAVIOR OF SKIN: ICD-10-CM

## 2020-10-30 PROBLEM — D48.5 NEOPLASM OF UNCERTAIN BEHAVIOR OF SKIN: Status: ACTIVE | Noted: 2020-10-30

## 2020-10-30 PROCEDURE — OTHER MIPS QUALITY: OTHER

## 2020-10-30 PROCEDURE — OTHER COUNSELING: OTHER

## 2020-10-30 PROCEDURE — OTHER BIOPSY BY SHAVE METHOD: OTHER

## 2020-10-30 PROCEDURE — 11102 TANGNTL BX SKIN SINGLE LES: CPT

## 2020-10-30 ASSESSMENT — LOCATION ZONE DERM: LOCATION ZONE: LEG

## 2020-10-30 ASSESSMENT — LOCATION SIMPLE DESCRIPTION DERM: LOCATION SIMPLE: LEFT CALF

## 2020-10-30 ASSESSMENT — LOCATION DETAILED DESCRIPTION DERM: LOCATION DETAILED: LEFT DISTAL LATERAL CALF

## 2020-10-30 NOTE — PROCEDURE: MIPS QUALITY
Quality 110: Preventive Care And Screening: Influenza Immunization: Influenza Immunization previously received during influenza season
Quality 130: Documentation Of Current Medications In The Medical Record: Current Medications Documented
Quality 265: Biopsy Follow-Up: Biopsy results reviewed, communicated, tracked, and documented
Detail Level: Detailed
Quality 431: Preventive Care And Screening: Unhealthy Alcohol Use - Screening: Patient screened for unhealthy alcohol use using a single question and scores 2 or greater episodes per year and brief intervention occurred
Quality 226: Preventive Care And Screening: Tobacco Use: Screening And Cessation Intervention: Patient screened for tobacco use and is an ex/non-smoker

## 2020-10-30 NOTE — PROCEDURE: BIOPSY BY SHAVE METHOD
Hide Biopsy Depth?: No
Anesthesia Volume In Cc (Will Not Render If 0): 0.5
Additional Anesthesia Volume In Cc (Will Not Render If 0): 0
Body Location Override (Optional - Billing Will Still Be Based On Selected Body Map Location If Applicable): L lateral LE
Hemostasis: Electrodesiccation
Validate Note Data (See Information Below): Yes
Silver Nitrate Text: The wound bed was treated with silver nitrate after the biopsy was performed.
Size Of Lesion In Cm: 2.2
Detail Level: Detailed
Consent: Written consent was obtained and risks were reviewed including but not limited to scarring, infection, bleeding, scabbing, incomplete removal, nerve damage and allergy to anesthesia.
Curettage Text: The wound bed was treated with curettage after the biopsy was performed.
Information: Selecting Yes will display possible errors in your note based on the variables you have selected. This validation is only offered as a suggestion for you. PLEASE NOTE THAT THE VALIDATION TEXT WILL BE REMOVED WHEN YOU FINALIZE YOUR NOTE. IF YOU WANT TO FAX A PRELIMINARY NOTE YOU WILL NEED TO TOGGLE THIS TO 'NO' IF YOU DO NOT WANT IT IN YOUR FAXED NOTE.
Dressing: pressure dressing with telfa
Post-Care Instructions: I reviewed with the patient in detail post-care instructions. Patient is to keep the biopsy site dry overnight, and then apply bacitracin twice daily until healed. Patient may apply hydrogen peroxide soaks to remove any crusting.
X Size Of Lesion In Cm: 1.5
Notification Instructions: Patient will be notified of biopsy results. However, patient instructed to call the office if not contacted within 2 weeks.
Billing Type: Third-Party Bill
Anesthesia Type: 1% lidocaine with epinephrine and a 1:10 solution of 8.4% sodium bicarbonate
Biopsy Method: double edge Personna blade
Electrodesiccation Text: The wound bed was treated with electrodesiccation after the biopsy was performed.
Path Notes (To The Dermatopathologist): Please check margins
Biopsy Type: H and E
Electrodesiccation And Curettage Text: The wound bed was treated with electrodesiccation and curettage after the biopsy was performed.
Type Of Destruction Used: Curettage
Wound Care: Petrolatum
Depth Of Biopsy: dermis
Cryotherapy Text: The wound bed was treated with cryotherapy after the biopsy was performed.

## 2020-11-19 ENCOUNTER — APPOINTMENT (OUTPATIENT)
Dept: URBAN - METROPOLITAN AREA CLINIC 255 | Age: 70
Setting detail: DERMATOLOGY
End: 2020-11-22

## 2020-11-19 PROBLEM — C44.719 BASAL CELL CARCINOMA OF SKIN OF LEFT LOWER LIMB, INCLUDING HIP: Status: ACTIVE | Noted: 2020-11-19

## 2020-11-19 PROCEDURE — 13121 CMPLX RPR S/A/L 2.6-7.5 CM: CPT

## 2020-11-19 PROCEDURE — OTHER MIPS QUALITY: OTHER

## 2020-11-19 PROCEDURE — OTHER MOHS SURGERY: OTHER

## 2020-11-19 PROCEDURE — 17313 MOHS 1 STAGE T/A/L: CPT

## 2020-11-19 NOTE — PROCEDURE: MOHS SURGERY
Body Location Override (Optional - Billing Will Still Be Based On Selected Body Map Location If Applicable): Left Lateral Lower Extremity

## 2020-11-19 NOTE — PROCEDURE: MOHS SURGERY
Anxious H Plasty Text: Given the location of the defect, shape of the defect and the proximity to free margins a H-plasty was deemed most appropriate for repair.  Using a sterile surgical marker, the appropriate advancement arms of the H-plasty were drawn incorporating the defect and placing the expected incisions within the relaxed skin tension lines where possible. The area thus outlined was incised deep to adipose tissue with a #15 scalpel blade. The skin margins were undermined to an appropriate distance in all directions utilizing iris scissors.  The opposing advancement arms were then advanced into place in opposite direction and anchored with interrupted buried subcutaneous sutures.

## 2020-11-19 NOTE — PROCEDURE: MOHS SURGERY
Rotation Flap Text: In order to preserve normal anatomic and functional relationships, a rotation flap was deemed the most appropriate reconstructive procedure.  The beveled edges of the Mohs defect were excised with a #15 scalpel blade.    Where feasible, the flap was designed to fall along relaxed skin tension lines and/or free margins, then incised, and elevated using blunt curved tissue scissors.  Hemostasis was achieved.  Interrupted buried vertical mattress sutures were employed to rotate and secure the flap in place.  Redundant cutaneous columns defined by the flap's movement were removed to the adipose layer using the triangulation technique and repaired in similar fashion.  Final epidermal approximation along the length of the flap was achieved using epidermal sutures.  The wound was stressed in various directions to ensure the adequacy of the closure and of hemostasis.  The flap edges appeared pink and well perfused.  Reconstruction was considered complete.

## 2020-11-19 NOTE — PROCEDURE: MIPS QUALITY
Quality 110: Preventive Care And Screening: Influenza Immunization: Influenza Immunization previously received during influenza season
Detail Level: Detailed
Quality 130: Documentation Of Current Medications In The Medical Record: Current Medications Documented
Quality 143: Oncology: Medical And Radiation- Pain Intensity Quantified: Pain severity quantified, no pain present
Quality 226: Preventive Care And Screening: Tobacco Use: Screening And Cessation Intervention: Patient screened for tobacco use and is an ex/non-smoker
Quality 431: Preventive Care And Screening: Unhealthy Alcohol Use - Screening: Patient screened for unhealthy alcohol use using a single question and scores 2 or greater episodes per year and brief intervention occurred

## 2020-12-03 ENCOUNTER — APPOINTMENT (OUTPATIENT)
Dept: URBAN - METROPOLITAN AREA CLINIC 255 | Age: 70
Setting detail: DERMATOLOGY
End: 2020-12-07

## 2020-12-03 DIAGNOSIS — Z48.02 ENCOUNTER FOR REMOVAL OF SUTURES: ICD-10-CM

## 2020-12-03 PROCEDURE — OTHER COUNSELING: OTHER

## 2020-12-03 PROCEDURE — OTHER SUTURE REMOVAL (GLOBAL PERIOD): OTHER

## 2020-12-03 PROCEDURE — OTHER MIPS QUALITY: OTHER

## 2020-12-03 PROCEDURE — OTHER DIAGNOSIS COMMENT: OTHER

## 2020-12-03 ASSESSMENT — LOCATION ZONE DERM: LOCATION ZONE: LEG

## 2020-12-03 ASSESSMENT — LOCATION DETAILED DESCRIPTION DERM: LOCATION DETAILED: LEFT POSTERIOR ANKLE

## 2020-12-03 ASSESSMENT — LOCATION SIMPLE DESCRIPTION DERM: LOCATION SIMPLE: LEFT ANKLE

## 2020-12-03 NOTE — PROCEDURE: MIPS QUALITY
Quality 130: Documentation Of Current Medications In The Medical Record: Current Medications Documented
Quality 431: Preventive Care And Screening: Unhealthy Alcohol Use - Screening: Patient screened for unhealthy alcohol use using a single question and scores 2 or greater episodes per year and brief intervention occurred
Quality 226: Preventive Care And Screening: Tobacco Use: Screening And Cessation Intervention: Patient screened for tobacco use and is an ex/non-smoker
Quality 110: Preventive Care And Screening: Influenza Immunization: Influenza Immunization previously received during influenza season
Detail Level: Detailed

## 2020-12-03 NOTE — PROCEDURE: SUTURE REMOVAL (GLOBAL PERIOD)
Body Location Override (Optional - Billing Will Still Be Based On Selected Body Map Location If Applicable): L lateral lower extremity
Add 35418 Cpt? (Important Note: In 2017 The Use Of 60380 Is Being Tracked By Cms To Determine Future Global Period Reimbursement For Global Periods): no
Detail Level: Detailed

## 2020-12-10 ENCOUNTER — OFFICE VISIT (OUTPATIENT)
Dept: INTERNAL MEDICINE | Facility: CLINIC | Age: 70
End: 2020-12-10
Payer: MEDICARE

## 2020-12-10 VITALS
OXYGEN SATURATION: 98 % | HEART RATE: 86 BPM | RESPIRATION RATE: 16 BRPM | DIASTOLIC BLOOD PRESSURE: 70 MMHG | TEMPERATURE: 98.3 F | HEIGHT: 70 IN | WEIGHT: 187.4 LBS | BODY MASS INDEX: 26.83 KG/M2 | SYSTOLIC BLOOD PRESSURE: 112 MMHG

## 2020-12-10 DIAGNOSIS — I10 ESSENTIAL HYPERTENSION, BENIGN: ICD-10-CM

## 2020-12-10 DIAGNOSIS — N52.9 ERECTILE DYSFUNCTION, UNSPECIFIED ERECTILE DYSFUNCTION TYPE: ICD-10-CM

## 2020-12-10 DIAGNOSIS — E78.5 HYPERLIPIDEMIA LDL GOAL <130: ICD-10-CM

## 2020-12-10 DIAGNOSIS — Z12.5 SCREENING PSA (PROSTATE SPECIFIC ANTIGEN): ICD-10-CM

## 2020-12-10 DIAGNOSIS — Z12.11 COLON CANCER SCREENING: ICD-10-CM

## 2020-12-10 DIAGNOSIS — Z13.6 ENCOUNTER FOR ABDOMINAL AORTIC ANEURYSM (AAA) SCREENING: ICD-10-CM

## 2020-12-10 DIAGNOSIS — M45.8 ANKYLOSING SPONDYLITIS OF SACRAL REGION (H): ICD-10-CM

## 2020-12-10 DIAGNOSIS — Z00.00 ENCOUNTER FOR MEDICARE ANNUAL WELLNESS EXAM: Primary | ICD-10-CM

## 2020-12-10 PROCEDURE — G0439 PPPS, SUBSEQ VISIT: HCPCS | Performed by: INTERNAL MEDICINE

## 2020-12-10 RX ORDER — TADALAFIL 20 MG/1
TABLET ORAL
Qty: 30 TABLET | Refills: 5 | Status: SHIPPED | OUTPATIENT
Start: 2020-12-10 | End: 2020-12-10

## 2020-12-10 RX ORDER — TADALAFIL 20 MG/1
TABLET ORAL
Qty: 30 TABLET | Refills: 5 | Status: SHIPPED | OUTPATIENT
Start: 2020-12-10 | End: 2021-10-27

## 2020-12-10 ASSESSMENT — MIFFLIN-ST. JEOR: SCORE: 1611.29

## 2020-12-10 NOTE — PROGRESS NOTES
"  SUBJECTIVE:   Saji Morrison is a 70 year old male who presents for Preventive Visit.  Patient has been advised of split billing requirements and indicates understanding: Yes    Are you in the first 12 months of your Medicare Part B coverage?  No    Physical Health:    In general, how would you rate your overall physical health? good    Outside of work, how many days during the week do you exercise? 6-7 days/week    Outside of work, approximately how many minutes a day do you exercise?45-60 minutes    If you drink alcohol do you typically have >3 drinks per day or >7 drinks per week? No    Do you usually eat at least 4 servings of fruit and vegetables a day, include whole grains & fiber and avoid regularly eating high fat or \"junk\" foods? NO    Do you have any problems taking medications regularly?  No    Do you have any side effects from medications? none    Needs assistance for the following daily activities: no assistance needed    Which of the following safety concerns are present in your home?  none identified     Hearing impairment: No    In the past 6 months, have you been bothered by leaking of urine? no    Mental Health:    In general, how would you rate your overall mental or emotional health? good  PHQ-2 Score:      Do you feel safe in your environment? Yes    Have you ever done Advance Care Planning? (For example, a Health Directive, POLST, or a discussion with a medical provider or your loved ones about your wishes): Yes, advance care planning is on file.    Additional concerns to address?  No    Fall risk:  Fallen 2 or more times in the past year?: No  Any fall with injury in the past year?: No    Cognitive Screenin) Repeat 3 items (Leader, Season, Table)    2) Clock draw: NORMAL  3) 3 item recall: Recalls 3 objects  Results: 3 items recalled: COGNITIVE IMPAIRMENT LESS LIKELY    Mini-CogTM Copyright S Sharon. Licensed by the author for use in [facility]; reprinted with " permission (daja@[email or web address]). All rights reserved.      Do you have sleep apnea, excessive snoring or daytime drowsiness?: no    Reviewed and updated as needed this visit by clinical staff               Reviewed and updated as needed this visit by Provider                Social History     Tobacco Use     Smoking status: Never Smoker     Smokeless tobacco: Never Used   Substance Use Topics     Alcohol use: Yes     Alcohol/week: 0.0 standard drinks     Comment: Occasionally.                           Current providers sharing in care for this patient include:   Patient Care Team:  Yared Gallardo MD as PCP - General  Yared Gallardo MD as Assigned PCP  Haseeb Nazario MD as Assigned Surgical Provider    The following health maintenance items are reviewed in Epic and correct as of today:  Health Maintenance   Topic Date Due     AORTIC ANEURYSM SCREENING (SYSTEM ASSIGNED)  07/20/2014     FALL RISK ASSESSMENT  09/04/2020     PSA  09/04/2021     MEDICARE ANNUAL WELLNESS VISIT  12/10/2021     DTAP/TDAP/TD IMMUNIZATION (2 - Td) 07/15/2024     LIPID  09/04/2024     ADVANCE CARE PLANNING  12/10/2025     COLORECTAL CANCER SCREENING  10/03/2028     HEPATITIS C SCREENING  Completed     PHQ-2  Completed     INFLUENZA VACCINE  Completed     Pneumococcal Vaccine: 65+ Years  Completed     ZOSTER IMMUNIZATION  Completed     Pneumococcal Vaccine: Pediatrics (0 to 5 Years) and At-Risk Patients (6 to 64 Years)  Aged Out     IPV IMMUNIZATION  Aged Out     MENINGITIS IMMUNIZATION  Aged Out     HEPATITIS B IMMUNIZATION  Aged Out       Immunization History   Administered Date(s) Administered     Influenza (High Dose) 3 valent vaccine 09/25/2017, 10/01/2018, 09/19/2019     Influenza (IIV3) PF 12/14/2000, 11/26/2003     Influenza, Quad, High Dose, Pf, 65yr + 10/12/2020     Pneumo Conj 13-V (2010&after) 08/09/2016     Pneumococcal 23 valent 09/16/2014     TDAP Vaccine (Adacel) 07/15/2014     Zoster vaccine recombinant adjuvanted (SHINGRIX)  "12/18/2019, 05/27/2020     ROS:  CONSTITUTIONAL: NEGATIVE for fever, chills, change in weight  EYES: NEGATIVE for vision changes or irritation  ENT/MOUTH: NEGATIVE for ear, mouth and throat problems  RESP: NEGATIVE for significant cough or SOB  CV: NEGATIVE for chest pain, palpitations or peripheral edema  GI: NEGATIVE for nausea, abdominal pain, heartburn, or change in bowel habits  : NEGATIVE for frequency, dysuria, or hematuria  MUSCULOSKELETAL: NEGATIVE for significant arthralgias or myalgia  NEURO: NEGATIVE for weakness, dizziness or paresthesias  HEME: NEGATIVE for bleeding problems  PSYCHIATRIC: NEGATIVE for changes in mood or affect    OBJECTIVE:   /70   Pulse 86   Temp 98.3  F (36.8  C) (Temporal)   Resp 16   Ht 1.778 m (5' 10\")   Wt 85 kg (187 lb 6.4 oz)   SpO2 98%   BMI 26.89 kg/m   Estimated body mass index is 26.86 kg/m  as calculated from the following:    Height as of 8/31/20: 1.778 m (5' 10\").    Weight as of 8/31/20: 84.9 kg (187 lb 3.2 oz).     EXAM:   GENERAL:  alert and no distress  EYES: Eyes grossly normal to inspection, PERRL and conjunctivae and sclerae normal  NECK: no adenopathy, no asymmetry, masses, or scars and thyroid normal to palpation  CV: regular rate and rhythm, normal S1 S2, no S3 or S4, no click or rub, no peripheral edema and peripheral pulses strong  ABDOMEN: soft, nontender, no hepatosplenomegaly, no masses and bowel sounds normal  RECTAL (male): normal sphincter tone, no rectal masses, prostate normal size, smooth, nontender without nodules or masses  MS: no gross musculoskeletal defects noted  NEURO: No focal changes noted  PSYCH: mentation appears normal, affect normal/bright    ASSESSMENT / PLAN:     1. Encounter for Medicare annual wellness exam  Stable and doing well continue with ongoing routine care  - Comprehensive metabolic panel; Future  - Lipid Profile; Future  - PSA, screen; Future    2. Essential hypertension, benign  At goal on therapy continue " "with medical management with [facility]B    3. Hyperlipidemia LDL goal <130  Labs ordered as fasting per routine.  - Lipid Profile; Future    4. Ankylosing spondylitis of sacral region (H)  Stable and following up as directed clinically off Remicade    5. Screening PSA (prostate specific antigen)  Ordered his routine screening based on age  - PSA, screen; Future    6. Colon cancer screening  Prior colonoscopy P.  - Fecal colorectal cancer screen (FIT); Future    7. Encounter for abdominal aortic aneurysm (AAA) screening  Ordered as routine screening based on age  - US Aorta Medicare AAA Screening; Future    8. Erectile dysfunction, unspecified erectile dysfunction type  Refill per patient request  - tadalafil (CIALIS) 20 MG tablet; TAKE 1 TABLET BY MOUTH ONCE DAILY AS NEEDED FOR  ERECTILE  DYSFUNCTION  MAX  20MG  DAILY, DO NOT USE WITH NTG PRODUCTS  Dispense: 30 tablet; Refill: 5    Patient has been advised of split billing requirements and indicates understanding: Yes    COUNSELING:  Reviewed preventive health counseling, as reflected in patient instructions       Regular exercise       Healthy diet/nutrition    Estimated body mass index is 26.86 kg/m  as calculated from the following:    Height as of 8/31/20: 1.778 m (5' 10\").    Weight as of 8/31/20: 84.9 kg (187 lb 3.2 oz).      He reports that he has never smoked. He has never used smokeless tobacco.    Appropriate preventive services were discussed with this patient, including applicable screening as appropriate for cardiovascular disease, diabetes, osteopenia/osteoporosis, and glaucoma.  As appropriate for age/gender, discussed screening for colorectal cancer, prostate cancer. Checklist reviewing preventive services available has been given to the patient.    Reviewed patients plan of care and provided an AVS. The Basic Care Plan (routine screening as documented in Health Maintenance) for Saji meets the Care Plan requirement. This Care Plan has been established and " reviewed with the Patient.    Counseling Resources:  ATP IV Guidelines  Pooled Cohorts Equation Calculator  Breast Cancer Risk Calculator  BRCA-Related Cancer Risk Assessment: FHS-7 Tool  FRAX Risk Assessment  ICSI Preventive Guidelines  Dietary Guidelines for Americans, 2010  USDA's MyPlate  ASA Prophylaxis  Lung CA Screening    Yared Gallardo MD  [facility]

## 2020-12-21 DIAGNOSIS — E78.5 HYPERLIPIDEMIA LDL GOAL <130: ICD-10-CM

## 2020-12-21 DIAGNOSIS — Z12.5 SCREENING PSA (PROSTATE SPECIFIC ANTIGEN): ICD-10-CM

## 2020-12-21 DIAGNOSIS — Z00.00 ENCOUNTER FOR MEDICARE ANNUAL WELLNESS EXAM: ICD-10-CM

## 2020-12-21 LAB
ALBUMIN SERPL-MCNC: 3.9 G/DL (ref 3.4–5)
ALP SERPL-CCNC: 70 U/L (ref 40–150)
ALT SERPL W P-5'-P-CCNC: 27 U/L (ref 0–70)
ANION GAP SERPL CALCULATED.3IONS-SCNC: 7 MMOL/L (ref 3–14)
AST SERPL W P-5'-P-CCNC: 26 U/L (ref 0–45)
BILIRUB SERPL-MCNC: 0.8 MG/DL (ref 0.2–1.3)
BUN SERPL-MCNC: 14 MG/DL (ref 7–30)
CALCIUM SERPL-MCNC: 8.7 MG/DL (ref 8.5–10.1)
CHLORIDE SERPL-SCNC: 109 MMOL/L (ref 94–109)
CHOLEST SERPL-MCNC: 261 MG/DL
CO2 SERPL-SCNC: 25 MMOL/L (ref 20–32)
CREAT SERPL-MCNC: 0.84 MG/DL (ref 0.66–1.25)
GFR SERPL CREATININE-BSD FRML MDRD: 88 ML/MIN/{1.73_M2}
GLUCOSE SERPL-MCNC: 82 MG/DL (ref 70–99)
HDLC SERPL-MCNC: 88 MG/DL
LDLC SERPL CALC-MCNC: 147 MG/DL
NONHDLC SERPL-MCNC: 173 MG/DL
POTASSIUM SERPL-SCNC: 4.1 MMOL/L (ref 3.4–5.3)
PROT SERPL-MCNC: 7.6 G/DL (ref 6.8–8.8)
PSA SERPL-ACNC: 3.45 UG/L (ref 0–4)
SODIUM SERPL-SCNC: 141 MMOL/L (ref 133–144)
TRIGL SERPL-MCNC: 129 MG/DL

## 2020-12-21 PROCEDURE — 80061 LIPID PANEL: CPT | Performed by: INTERNAL MEDICINE

## 2020-12-21 PROCEDURE — 80053 COMPREHEN METABOLIC PANEL: CPT | Performed by: INTERNAL MEDICINE

## 2020-12-21 PROCEDURE — G0103 PSA SCREENING: HCPCS | Performed by: INTERNAL MEDICINE

## 2020-12-21 PROCEDURE — 36415 COLL VENOUS BLD VENIPUNCTURE: CPT | Performed by: INTERNAL MEDICINE

## 2021-03-17 ENCOUNTER — APPOINTMENT (OUTPATIENT)
Dept: URBAN - METROPOLITAN AREA CLINIC 255 | Age: 71
Setting detail: DERMATOLOGY
End: 2021-03-24

## 2021-03-17 DIAGNOSIS — Z85.828 PERSONAL HISTORY OF OTHER MALIGNANT NEOPLASM OF SKIN: ICD-10-CM

## 2021-03-17 DIAGNOSIS — L57.0 ACTINIC KERATOSIS: ICD-10-CM

## 2021-03-17 DIAGNOSIS — D485 NEOPLASM OF UNCERTAIN BEHAVIOR OF SKIN: ICD-10-CM

## 2021-03-17 DIAGNOSIS — L82.0 INFLAMED SEBORRHEIC KERATOSIS: ICD-10-CM

## 2021-03-17 DIAGNOSIS — L72.8 OTHER FOLLICULAR CYSTS OF THE SKIN AND SUBCUTANEOUS TISSUE: ICD-10-CM

## 2021-03-17 DIAGNOSIS — D18.0 HEMANGIOMA: ICD-10-CM

## 2021-03-17 DIAGNOSIS — L81.4 OTHER MELANIN HYPERPIGMENTATION: ICD-10-CM

## 2021-03-17 DIAGNOSIS — L82.1 OTHER SEBORRHEIC KERATOSIS: ICD-10-CM

## 2021-03-17 DIAGNOSIS — D22 MELANOCYTIC NEVI: ICD-10-CM

## 2021-03-17 PROBLEM — D48.5 NEOPLASM OF UNCERTAIN BEHAVIOR OF SKIN: Status: ACTIVE | Noted: 2021-03-17

## 2021-03-17 PROBLEM — D22.5 MELANOCYTIC NEVI OF TRUNK: Status: ACTIVE | Noted: 2021-03-17

## 2021-03-17 PROBLEM — D18.01 HEMANGIOMA OF SKIN AND SUBCUTANEOUS TISSUE: Status: ACTIVE | Noted: 2021-03-17

## 2021-03-17 PROCEDURE — 11103 TANGNTL BX SKIN EA SEP/ADDL: CPT | Mod: 59

## 2021-03-17 PROCEDURE — 99213 OFFICE O/P EST LOW 20 MIN: CPT | Mod: 25

## 2021-03-17 PROCEDURE — OTHER COUNSELING: OTHER

## 2021-03-17 PROCEDURE — OTHER MIPS QUALITY: OTHER

## 2021-03-17 PROCEDURE — 17000 DESTRUCT PREMALG LESION: CPT | Mod: 59

## 2021-03-17 PROCEDURE — 17110 DESTRUCT B9 LESION 1-14: CPT

## 2021-03-17 PROCEDURE — OTHER LIQUID NITROGEN: OTHER

## 2021-03-17 PROCEDURE — OTHER BIOPSY BY SHAVE METHOD: OTHER

## 2021-03-17 PROCEDURE — 17003 DESTRUCT PREMALG LES 2-14: CPT | Mod: 59

## 2021-03-17 PROCEDURE — OTHER DEFER: OTHER

## 2021-03-17 PROCEDURE — 11102 TANGNTL BX SKIN SINGLE LES: CPT | Mod: 59

## 2021-03-17 ASSESSMENT — LOCATION DETAILED DESCRIPTION DERM
LOCATION DETAILED: LEFT MEDIAL ZYGOMA
LOCATION DETAILED: LEFT SUPERIOR FOREHEAD
LOCATION DETAILED: LEFT EXTERNAL AUDITORY CANAL
LOCATION DETAILED: LEFT RADIAL DORSAL HAND
LOCATION DETAILED: LEFT MEDIAL TEMPLE
LOCATION DETAILED: RIGHT INFERIOR FOREHEAD
LOCATION DETAILED: LEFT CENTRAL TEMPLE
LOCATION DETAILED: LEFT CLAVICULAR SKIN
LOCATION DETAILED: LEFT MID-UPPER BACK
LOCATION DETAILED: RIGHT INFERIOR MEDIAL UPPER BACK
LOCATION DETAILED: RIGHT ANTITRAGUS
LOCATION DETAILED: SUPERIOR THORACIC SPINE
LOCATION DETAILED: RIGHT FOREHEAD
LOCATION DETAILED: LEFT INFERIOR FOREHEAD
LOCATION DETAILED: RIGHT MID-UPPER BACK
LOCATION DETAILED: RIGHT MEDIAL UPPER BACK
LOCATION DETAILED: LEFT LATERAL EYEBROW
LOCATION DETAILED: LEFT SUPERIOR MEDIAL UPPER BACK
LOCATION DETAILED: RIGHT SUPERIOR MEDIAL UPPER BACK
LOCATION DETAILED: LEFT SUPERIOR CENTRAL MALAR CHEEK
LOCATION DETAILED: LEFT DISTAL CALF
LOCATION DETAILED: RIGHT CENTRAL MANDIBULAR CHEEK
LOCATION DETAILED: RIGHT LATERAL ABDOMEN
LOCATION DETAILED: RIGHT SUPERIOR MEDIAL MALAR CHEEK
LOCATION DETAILED: LEFT POSTERIOR SHOULDER

## 2021-03-17 ASSESSMENT — LOCATION ZONE DERM
LOCATION ZONE: HAND
LOCATION ZONE: FACE
LOCATION ZONE: EAR
LOCATION ZONE: ARM
LOCATION ZONE: TRUNK
LOCATION ZONE: LEG

## 2021-03-17 ASSESSMENT — LOCATION SIMPLE DESCRIPTION DERM
LOCATION SIMPLE: LEFT ZYGOMA
LOCATION SIMPLE: RIGHT FOREHEAD
LOCATION SIMPLE: LEFT CLAVICULAR SKIN
LOCATION SIMPLE: LEFT CHEEK
LOCATION SIMPLE: ABDOMEN
LOCATION SIMPLE: RIGHT CHEEK
LOCATION SIMPLE: LEFT EAR
LOCATION SIMPLE: LEFT CALF
LOCATION SIMPLE: RIGHT UPPER BACK
LOCATION SIMPLE: LEFT HAND
LOCATION SIMPLE: RIGHT EAR
LOCATION SIMPLE: UPPER BACK
LOCATION SIMPLE: LEFT UPPER BACK
LOCATION SIMPLE: LEFT EYEBROW
LOCATION SIMPLE: LEFT FOREHEAD
LOCATION SIMPLE: LEFT TEMPLE
LOCATION SIMPLE: LEFT SHOULDER

## 2021-03-17 NOTE — PROCEDURE: LIQUID NITROGEN
Medical Necessity Clause: This procedure was medically necessary because the lesions that were treated were:
Post-Care Instructions: I reviewed with the patient in detail post-care instructions. Patient is to wear sunprotection, and avoid picking at any of the treated lesions. Pt may apply Vaseline to crusted or scabbing areas.
Add 52 Modifier (Optional): no
Number Of Freeze-Thaw Cycles: 1 freeze-thaw cycle
Detail Level: Detailed
Duration Of Freeze Thaw-Cycle (Seconds): 15-20
Medical Necessity Information: It is in your best interest to select a reason for this procedure from the list below. All of these items fulfill various CMS LCD requirements except the new and changing color options.
Consent: The patient's consent was obtained including but not limited to risks of crusting, scabbing, blistering, scarring, darker or lighter pigmentary change, recurrence, incomplete removal and infection.
Duration Of Freeze Thaw-Cycle (Seconds): 10

## 2021-03-17 NOTE — PROCEDURE: BIOPSY BY SHAVE METHOD
Was A Bandage Applied: Yes
Validate Anticipated Plan: No
X Size Of Lesion In Cm: 0.9
Body Location Override (Optional - Billing Will Still Be Based On Selected Body Map Location If Applicable): right upper quadrant abdomen
Detail Level: Detailed
Anesthesia Type: 2% lidocaine with epinephrine and a 1:10 solution of 8.4% sodium bicarbonate
Electrodesiccation And Curettage Text: The wound bed was treated with electrodesiccation and curettage after the biopsy was performed.
Information: Selecting Yes will display possible errors in your note based on the variables you have selected. This validation is only offered as a suggestion for you. PLEASE NOTE THAT THE VALIDATION TEXT WILL BE REMOVED WHEN YOU FINALIZE YOUR NOTE. IF YOU WANT TO FAX A PRELIMINARY NOTE YOU WILL NEED TO TOGGLE THIS TO 'NO' IF YOU DO NOT WANT IT IN YOUR FAXED NOTE.
Cryotherapy Text: The wound bed was treated with cryotherapy after the biopsy was performed.
Electrodesiccation Text: The wound bed was treated with electrodesiccation after the biopsy was performed.
Path Notes (To The Dermatopathologist): please check margins
Type Of Destruction Used: Curettage
Additional Anesthesia Volume In Cc (Will Not Render If 0): 0
Post-Care Instructions: I reviewed with the patient in detail post-care instructions. Patient is to keep the biopsy site dry overnight, and then apply bacitracin twice daily until healed. Patient may apply hydrogen peroxide soaks to remove any crusting.
Silver Nitrate Text: The wound bed was treated with silver nitrate after the biopsy was performed.
Depth Of Biopsy: dermis
Notification Instructions: Patient will be notified of biopsy results. However, patient instructed to call the office if not contacted within 2 weeks.
Size Of Lesion In Cm: 1.2
Biopsy Type: H and E
Anesthesia Volume In Cc (Will Not Render If 0): 0.5
X Size Of Lesion In Cm: 1
Wound Care: Petrolatum
Body Location Override (Optional - Billing Will Still Be Based On Selected Body Map Location If Applicable): Right Flank
Curettage Text: The wound bed was treated with curettage after the biopsy was performed.
Billing Type: Third-Party Bill
Dressing: bandage
Biopsy Method: double edge Personna blade
Consent: Written consent was obtained and risks were reviewed including but not limited to scarring, infection, bleeding, scabbing, incomplete removal, nerve damage and allergy to anesthesia.
Hemostasis: Electrocautery and Aluminum Chloride
Size Of Lesion In Cm: 2
Hemostasis: Drysol

## 2021-03-17 NOTE — PROCEDURE: MIPS QUALITY
Quality 130: Documentation Of Current Medications In The Medical Record: Current Medications Documented
Quality 265: Biopsy Follow-Up: Biopsy results reviewed, communicated, tracked, and documented
Quality 226: Preventive Care And Screening: Tobacco Use: Screening And Cessation Intervention: Patient screened for tobacco use, is a smoker AND received Cessation Counseling
Quality 110: Preventive Care And Screening: Influenza Immunization: Influenza Immunization previously received during influenza season
Quality 431: Preventive Care And Screening: Unhealthy Alcohol Use - Screening: Patient screened for unhealthy alcohol use using a single question and scores less than 2 times per year
Detail Level: Detailed

## 2021-04-01 ENCOUNTER — APPOINTMENT (OUTPATIENT)
Dept: URBAN - METROPOLITAN AREA CLINIC 255 | Age: 71
Setting detail: DERMATOLOGY
End: 2021-04-04

## 2021-04-01 PROBLEM — C44.519 BASAL CELL CARCINOMA OF SKIN OF OTHER PART OF TRUNK: Status: ACTIVE | Noted: 2021-04-01

## 2021-04-01 PROCEDURE — OTHER MIPS QUALITY: OTHER

## 2021-04-01 PROCEDURE — 17263 DSTRJ MAL LES T/A/L 2.1-3.0: CPT

## 2021-04-01 PROCEDURE — OTHER COUNSELING: OTHER

## 2021-04-01 PROCEDURE — 17262 DSTRJ MAL LES T/A/L 1.1-2.0: CPT

## 2021-04-01 PROCEDURE — OTHER CURETTAGE AND DESTRUCTION: OTHER

## 2021-04-01 NOTE — PROCEDURE: MIPS QUALITY
Quality 226: Preventive Care And Screening: Tobacco Use: Screening And Cessation Intervention: Patient screened for tobacco use, is a smoker AND received Cessation Counseling
Quality 110: Preventive Care And Screening: Influenza Immunization: Influenza Immunization previously received during influenza season
Quality 143: Oncology: Medical And Radiation- Pain Intensity Quantified: Pain severity quantified, no pain present
Quality 130: Documentation Of Current Medications In The Medical Record: Current Medications Documented
Detail Level: Detailed
Quality 431: Preventive Care And Screening: Unhealthy Alcohol Use - Screening: Patient screened for unhealthy alcohol use using a single question and scores less than 2 times per year

## 2021-04-01 NOTE — PROCEDURE: CURETTAGE AND DESTRUCTION
Add Intralesional Injection: No
Body Location Override (Optional - Billing Will Still Be Based On Selected Body Map Location If Applicable): Right Upper Quadrant Abdomen
Size Of Lesion After Curettage: 2.6
Anesthesia Type: 2% lidocaine with epinephrine and a 1:10 solution of 8.4% sodium bicarbonate
Final Size Statement: The size of the lesion after curettage was
Number Of Curettages: 3
Body Location Override (Optional - Billing Will Still Be Based On Selected Body Map Location If Applicable): Right Flank
Detail Level: Detailed
What Was Performed First?: Curettage
Consent was obtained from the patient. The risks, benefits and alternatives to therapy were discussed in detail. Specifically, the risks of infection, scarring, bleeding, prolonged wound healing, nerve injury, incomplete removal, allergy to anesthesia and recurrence were addressed. Alternatives to ED&C, such as: surgical removal and XRT were also discussed.  Prior to the procedure, the treatment site was clearly identified and confirmed by the patient. All components of Universal Protocol/PAUSE Rule completed.
Post-Care Instructions: I reviewed with the patient in detail post-care instructions. Patient is to keep the area dry for 48 hours, and not to engage in any swimming until the area is healed. Should the patient develop any fevers, chills, bleeding, severe pain patient will contact the office immediately.
Size Of Lesion In Cm: 1.2
Cautery Type: electrodesiccation
Bill As A Line Item Or As Units: Line Item
Total Volume (Ccs): 1
Additional Information: (Optional): The wound was cleaned, and vaseline and a band aid were applied.  The patient received detailed post-op instructions.
Size Of Lesion After Curettage: 1.8
Size Of Lesion In Cm: 2

## 2021-05-08 DIAGNOSIS — I10 ESSENTIAL HYPERTENSION, BENIGN: ICD-10-CM

## 2021-05-10 RX ORDER — IRBESARTAN 150 MG/1
TABLET ORAL
Qty: 90 TABLET | Refills: 0 | Status: SHIPPED | OUTPATIENT
Start: 2021-05-10 | End: 2021-12-14

## 2021-05-10 NOTE — TELEPHONE ENCOUNTER
"Requested Prescriptions   Signed Prescriptions Disp Refills    irbesartan (AVAPRO) 150 MG tablet 90 tablet 0     Sig: TAKE 1 TABLET BY MOUTH EVERYDAY AT BEDTIME       Angiotensin-II Receptors Passed - 5/8/2021  9:43 AM        Passed - Last blood pressure under 140/90 in past 12 months     BP Readings from Last 3 Encounters:   12/10/20 112/70   08/31/20 130/82   03/06/20 124/82                 Passed - Recent (12 mo) or future (30 days) visit within the authorizing provider's specialty     Patient has had an office visit with the authorizing provider or a provider within the authorizing providers department within the previous 12 mos or has a future within next 30 days. See \"Patient Info\" tab in inbasket, or \"Choose Columns\" in Meds & Orders section of the refill encounter.              Passed - Medication is active on med list        Passed - Patient is age 18 or older        Passed - Normal serum creatinine on file in past 12 months     Recent Labs   Lab Test 12/21/20  1117   CR 0.84       Ok to refill medication if creatinine is low          Passed - Normal serum potassium on file in past 12 months     Recent Labs   Lab Test 12/21/20  1117   POTASSIUM 4.1                       "

## 2021-07-06 ENCOUNTER — OFFICE VISIT (OUTPATIENT)
Dept: INTERNAL MEDICINE | Facility: CLINIC | Age: 71
End: 2021-07-06
Payer: COMMERCIAL

## 2021-07-06 VITALS
HEART RATE: 67 BPM | TEMPERATURE: 98.3 F | BODY MASS INDEX: 27.09 KG/M2 | DIASTOLIC BLOOD PRESSURE: 78 MMHG | OXYGEN SATURATION: 99 % | HEIGHT: 70 IN | WEIGHT: 189.2 LBS | SYSTOLIC BLOOD PRESSURE: 136 MMHG

## 2021-07-06 DIAGNOSIS — F41.9 ANXIOUS MOOD: Primary | ICD-10-CM

## 2021-07-06 DIAGNOSIS — I10 ESSENTIAL HYPERTENSION, BENIGN: ICD-10-CM

## 2021-07-06 LAB
HGB BLD-MCNC: 14.4 G/DL (ref 13.3–17.7)
TSH SERPL DL<=0.005 MIU/L-ACNC: 1.89 MU/L (ref 0.4–4)

## 2021-07-06 PROCEDURE — 99214 OFFICE O/P EST MOD 30 MIN: CPT | Performed by: INTERNAL MEDICINE

## 2021-07-06 PROCEDURE — 85018 HEMOGLOBIN: CPT | Performed by: INTERNAL MEDICINE

## 2021-07-06 PROCEDURE — 36415 COLL VENOUS BLD VENIPUNCTURE: CPT | Performed by: INTERNAL MEDICINE

## 2021-07-06 PROCEDURE — 84443 ASSAY THYROID STIM HORMONE: CPT | Performed by: INTERNAL MEDICINE

## 2021-07-06 RX ORDER — CITALOPRAM HYDROBROMIDE 20 MG/1
20 TABLET ORAL DAILY
Qty: 90 TABLET | Refills: 1 | Status: SHIPPED | OUTPATIENT
Start: 2021-07-06 | End: 2021-12-14

## 2021-07-06 ASSESSMENT — PATIENT HEALTH QUESTIONNAIRE - PHQ9: SUM OF ALL RESPONSES TO PHQ QUESTIONS 1-9: 4

## 2021-07-06 ASSESSMENT — MIFFLIN-ST. JEOR: SCORE: 1619.46

## 2021-07-06 NOTE — PROGRESS NOTES
"    Assessment & Plan     Anxious mood  Discussed with patient options available.  Suggest trial of SSRI.  Patient refused counseling option.  Check thyroid function panel.  No focal changes on neurologic exam to reflect primary neurologic disorder related mild extension tremor but if symptoms do not improve or worsen this may be warranted as an evaluation  - TSH with free T4 reflex  - citalopram (CELEXA) 20 MG tablet; Take 1 tablet (20 mg) by mouth daily    Discussed dosing and side effect profile of medicine.    Essential hypertension, benign    Therapy continue with current medical management  - Hemoglobin     BMI:   Estimated body mass index is 27.15 kg/m  as calculated from the following:    Height as of this encounter: 1.778 m (5' 10\").    Weight as of this encounter: 85.8 kg (189 lb 3.2 oz).       See Patient Instructions    Return in about 1 month (around 8/6/2021) for if symptoms recur or worsen, follow-up with regular primary care provider.    Yared Gallardo MD  [facility] is a 70 year old who presents for the following health issues  accompanied by his self.    HPI     Abnormal Mood Symptoms:    Onset/Duration: about 5 months   Description: shakiness   Depression (if yes, do PHQ-9): no  Anxiety (if yes, do ANJU-7): YES  Accompanying Signs & Symptoms:  Still participating in activities that you used to enjoy: YES  Fatigue: YES- mild   Irritability: no  Difficulty concentrating: YES- sometimes   Changes in appetite: no  Problems with sleep: YES  Heart racing/beating fast: no  Abnormally elevated, expansive, or irritable mood: no  Persistently increased activity or energy: no  Thoughts of hurting yourself or others: no  History:  Recent stress or major life event: no  Prior depression or anxiety: None  Family history of depression or anxiety: no  Alcohol/drug use: YES- 2-3 alcoholic drinks per day   Difficulty sleeping: YES  Precipitating or alleviating " "factors: None  Therapies tried and outcome: none    Denies excessive ETOH use.  Noted significant other broke up with him around time symptoms developed.  Has been getting steroid injections in hips 3-4 times yearly.    PHQ 12/21/2018 9/4/2019   PHQ-9 Total Score 10 1   Q9: Thoughts of better off dead/self-harm past 2 weeks More than half the days Not at all     ANJU-7 SCORE 12/21/2018   Total Score 15       Review of Systems   CONSTITUTIONAL: NEGATIVE for fever, chills, change in weight  EYES: NEGATIVE for vision changes or irritation  ENT/MOUTH: NEGATIVE for ear, mouth and throat problems  RESP: NEGATIVE for significant cough or SOB  CV: NEGATIVE for chest pain, palpitations or peripheral edema  GI: NEGATIVE for nausea, abdominal pain, heartburn, or change in bowel habits  : NEGATIVE for frequency, dysuria, or hematuria  MUSCULOSKELETAL: NEGATIVE for significant arthralgias or myalgia  NEURO: NEGATIVE for weakness, dizziness or paresthesias  HEME: NEGATIVE for bleeding problems      Objective    /78   Pulse 67   Temp 98.3  F (36.8  C) (Oral)   Ht 1.778 m (5' 10\")   Wt 85.8 kg (189 lb 3.2 oz)   SpO2 99%   BMI 27.15 kg/m    Body mass index is 27.15 kg/m .  Physical Exam   GENERAL: healthy, alert and no distress  EYES: Eyes grossly normal to inspection, PERRL and conjunctivae and sclerae normal  HENT: ear canals and TM's normal, nose and mouth without ulcers or lesions  NECK: no adenopathy, no asymmetry, masses, or scars and thyroid normal to palpation  RESP: lungs clear to auscultation - no rales, rhonchi or wheezes  CV: regular rate and rhythm, normal S1 S2, no S3 or S4, no click or rub  MS: no gross musculoskeletal defects noted  NEURO: No focal changes less mild extension tremor bilateral hands.  PSYCH: anxious                "

## 2021-08-23 NOTE — PROCEDURE: MOHS SURGERY
No Melolabial Transposition Flap Text: In order to preserve normal anatomic and functional relationships, a melolabial transpotision flap was deemed the most appropriate reconstructive procedure.  The beveled edges of the Mohs defect were excised with a #15 scalpel blade.  The flap was designed to fall along relaxed skin tension lines and/or free margins, incised, and elevated using blunt curved tissue scissors.  The underside of the flap was carefully defatted for a precise fit into the primary defect.  Hemostasis was achieved.  Interrupted buried vertical mattress sutures were employed to transpose into position and secure the flap in place.  Redundant cutaneous columns defined by the flap's movement were removed to the adipose layer using the triangulation technique and repaired in similar fashion.  Final epidermal approximation along the length of the flap was achieved using epidermal sutures.  The wound was stressed in various directions to ensure the adequacy of the closure and of hemostasis.  The flap edges appeared pink and well perfused.  Reconstruction was considered complete.

## 2021-09-05 ENCOUNTER — HEALTH MAINTENANCE LETTER (OUTPATIENT)
Age: 71
End: 2021-09-05

## 2021-09-22 ENCOUNTER — APPOINTMENT (OUTPATIENT)
Dept: URBAN - METROPOLITAN AREA CLINIC 255 | Age: 71
Setting detail: DERMATOLOGY
End: 2021-09-27

## 2021-09-22 DIAGNOSIS — D22 MELANOCYTIC NEVI: ICD-10-CM

## 2021-09-22 DIAGNOSIS — Z85.828 PERSONAL HISTORY OF OTHER MALIGNANT NEOPLASM OF SKIN: ICD-10-CM

## 2021-09-22 DIAGNOSIS — L81.4 OTHER MELANIN HYPERPIGMENTATION: ICD-10-CM

## 2021-09-22 DIAGNOSIS — D18.0 HEMANGIOMA: ICD-10-CM

## 2021-09-22 DIAGNOSIS — L82.0 INFLAMED SEBORRHEIC KERATOSIS: ICD-10-CM

## 2021-09-22 DIAGNOSIS — L72.8 OTHER FOLLICULAR CYSTS OF THE SKIN AND SUBCUTANEOUS TISSUE: ICD-10-CM

## 2021-09-22 DIAGNOSIS — L82.1 OTHER SEBORRHEIC KERATOSIS: ICD-10-CM

## 2021-09-22 PROBLEM — D22.5 MELANOCYTIC NEVI OF TRUNK: Status: ACTIVE | Noted: 2021-09-22

## 2021-09-22 PROBLEM — D18.01 HEMANGIOMA OF SKIN AND SUBCUTANEOUS TISSUE: Status: ACTIVE | Noted: 2021-09-22

## 2021-09-22 PROCEDURE — 99213 OFFICE O/P EST LOW 20 MIN: CPT | Mod: 25

## 2021-09-22 PROCEDURE — OTHER LIQUID NITROGEN: OTHER

## 2021-09-22 PROCEDURE — OTHER COUNSELING: OTHER

## 2021-09-22 PROCEDURE — OTHER MIPS QUALITY: OTHER

## 2021-09-22 PROCEDURE — 17110 DESTRUCT B9 LESION 1-14: CPT

## 2021-09-22 ASSESSMENT — LOCATION SIMPLE DESCRIPTION DERM
LOCATION SIMPLE: LEFT EYEBROW
LOCATION SIMPLE: RIGHT CHEEK
LOCATION SIMPLE: LEFT UPPER BACK
LOCATION SIMPLE: NECK
LOCATION SIMPLE: LEFT ZYGOMA
LOCATION SIMPLE: RIGHT UPPER BACK
LOCATION SIMPLE: LEFT CHEEK
LOCATION SIMPLE: RIGHT EAR

## 2021-09-22 ASSESSMENT — LOCATION DETAILED DESCRIPTION DERM
LOCATION DETAILED: LEFT MEDIAL ZYGOMA
LOCATION DETAILED: LEFT SUPERIOR MEDIAL UPPER BACK
LOCATION DETAILED: RIGHT SUPERIOR CENTRAL MALAR CHEEK
LOCATION DETAILED: LEFT INFERIOR CENTRAL MALAR CHEEK
LOCATION DETAILED: RIGHT INFERIOR MEDIAL UPPER BACK
LOCATION DETAILED: RIGHT CENTRAL LATERAL NECK
LOCATION DETAILED: RIGHT ANTIHELIX
LOCATION DETAILED: LEFT EYEBROW
LOCATION DETAILED: LEFT MID PREAURICULAR CHEEK
LOCATION DETAILED: RIGHT MEDIAL UPPER BACK
LOCATION DETAILED: RIGHT CENTRAL MALAR CHEEK
LOCATION DETAILED: RIGHT INFERIOR HELIX

## 2021-09-22 ASSESSMENT — LOCATION ZONE DERM
LOCATION ZONE: TRUNK
LOCATION ZONE: NECK
LOCATION ZONE: FACE
LOCATION ZONE: EAR

## 2021-09-22 NOTE — HPI: FULL BODY SKIN EXAMINATION
What Type Of Note Output Would You Prefer (Optional)?: Standard Output
What Is The Reason For Today's Visit?: Full Body Skin Examination
What Is The Reason For Today's Visit? (Being Monitored For X): surveillance against the recurrence of atypical nevi
Additional History: Cyst on right neck that has been present for 10 plus years, he would like to discuss removal options.

## 2021-09-22 NOTE — PROCEDURE: MIPS QUALITY
Detail Level: Detailed
Quality 431: Preventive Care And Screening: Unhealthy Alcohol Use - Screening: Patient not identified as an unhealthy alcohol user when screened for unhealthy alcohol use using a systematic screening method
Quality 226: Preventive Care And Screening: Tobacco Use: Screening And Cessation Intervention: Patient screened for tobacco use, is a smoker AND did not received Cessation Counseling for Unknown Reasons
Quality 130: Documentation Of Current Medications In The Medical Record: Current Medications Documented
Quality 110: Preventive Care And Screening: Influenza Immunization: Influenza Immunization previously received during influenza season

## 2021-09-22 NOTE — PROCEDURE: LIQUID NITROGEN
Consent: - Verbal and written consent was obtained, and risks were reviewed prior to procedure today. \\n- Risks discussed include but are not limited to pain, crusting, scabbing, blistering, scarring, temporary or permanent darker or lighter pigmentary change, recurrence, incomplete resolution, and infection.
Add 52 Modifier (Optional): no
Detail Level: Detailed
Render Post-Care Instructions In Note?: yes
Post-Care Instructions: - Avoid picking at any of the treated lesions.
Medical Necessity Clause: This procedure was medically necessary because the lesions that were treated were:
Medical Necessity Information: It is in your best interest to select a reason for this procedure from the list below. All of these items fulfill various CMS LCD requirements except the new and changing color options.

## 2021-10-14 ENCOUNTER — APPOINTMENT (OUTPATIENT)
Dept: URBAN - METROPOLITAN AREA CLINIC 255 | Age: 71
Setting detail: DERMATOLOGY
End: 2021-10-14

## 2021-10-14 DIAGNOSIS — L72.8 OTHER FOLLICULAR CYSTS OF THE SKIN AND SUBCUTANEOUS TISSUE: ICD-10-CM

## 2021-10-14 PROCEDURE — 12042 INTMD RPR N-HF/GENIT2.6-7.5: CPT

## 2021-10-14 PROCEDURE — OTHER MIPS QUALITY: OTHER

## 2021-10-14 PROCEDURE — OTHER EXCISION: OTHER

## 2021-10-14 PROCEDURE — 11422 EXC H-F-NK-SP B9+MARG 1.1-2: CPT

## 2021-10-14 ASSESSMENT — LOCATION ZONE DERM: LOCATION ZONE: NECK

## 2021-10-14 ASSESSMENT — LOCATION SIMPLE DESCRIPTION DERM: LOCATION SIMPLE: NECK

## 2021-10-14 ASSESSMENT — LOCATION DETAILED DESCRIPTION DERM: LOCATION DETAILED: RIGHT SUPERIOR LATERAL NECK

## 2021-10-14 NOTE — PROCEDURE: EXCISION
Complex Repair And Split-Thickness Skin Graft Text: The defect edges were debeveled with a #15 scalpel blade.  The primary defect was closed partially with a complex linear closure.  Given the location of the defect, shape of the defect and the proximity to free margins a split thickness skin graft was deemed most appropriate to repair the remaining defect.  The graft was trimmed to fit the size of the remaining defect.  The graft was then placed in the primary defect, oriented appropriately, and sutured into place.
Complex Repair And O-L Flap Text: The defect edges were debeveled with a #15 scalpel blade.  The primary defect was closed partially with a complex linear closure.  Given the location of the remaining defect, shape of the defect and the proximity to free margins an O-L flap was deemed most appropriate for complete closure of the defect.  Using a sterile surgical marker, an appropriate flap was drawn incorporating the defect and placing the expected incisions within the relaxed skin tension lines where possible.    The area thus outlined was incised deep to adipose tissue with a #15 scalpel blade.  The skin margins were undermined to an appropriate distance in all directions utilizing iris scissors.
Complex Repair And M Plasty Text: The defect edges were debeveled with a #15 scalpel blade.  The primary defect was closed partially with a complex linear closure.  Given the location of the remaining defect, shape of the defect and the proximity to free margins an M plasty was deemed most appropriate for complete closure of the defect.  Using a sterile surgical marker, an appropriate advancement flap was drawn incorporating the defect and placing the expected incisions within the relaxed skin tension lines where possible.    The area thus outlined was incised deep to adipose tissue with a #15 scalpel blade.  The skin margins were undermined to an appropriate distance in all directions utilizing iris scissors.
Body Location Override (Optional - Billing Will Still Be Based On Selected Body Map Location If Applicable): Right Infra-Auricular Neck
Paramedian Forehead Flap Text: A decision was made to reconstruct the defect utilizing an interpolation axial flap and a staged reconstruction.  A telfa template was made of the defect.  This telfa template was then used to outline the paramedian forehead pedicle flap.  The donor area for the pedicle flap was then injected with anesthesia.  The flap was excised through the skin and subcutaneous tissue down to the layer of the underlying musculature.  The pedicle flap was carefully excised within this deep plane to maintain its blood supply.  The edges of the donor site were undermined.   The donor site was closed in a primary fashion.  The pedicle was then rotated into position and sutured.  Once the tube was sutured into place, adequate blood supply was confirmed with blanching and refill.  The pedicle was then wrapped with xeroform gauze and dressed appropriately with a telfa and gauze bandage to ensure continued blood supply and protect the attached pedicle.
Consent was obtained from the patient. The risks and benefits to therapy were discussed in detail. Specifically, the risks of infection, scarring, bleeding, prolonged wound healing, incomplete removal, allergy to anesthesia, nerve injury and recurrence were addressed. Prior to the procedure, the treatment site was clearly identified and confirmed by the patient. All components of Universal Protocol/PAUSE Rule completed.
Epidermal Autograft Text: The defect edges were debeveled with a #15 scalpel blade.  Given the location of the defect, shape of the defect and the proximity to free margins an epidermal autograft was deemed most appropriate.  Using a sterile surgical marker, the primary defect shape was transferred to the donor site. The epidermal graft was then harvested.  The skin graft was then placed in the primary defect and oriented appropriately.
Dorsal Nasal Flap Text: The defect edges were debeveled with a #15 scalpel blade.  Given the location of the defect and the proximity to free margins a dorsal nasal flap was deemed most appropriate.  Using a sterile surgical marker, an appropriate dorsal nasal flap was drawn around the defect.    The area thus outlined was incised deep to adipose tissue with a #15 scalpel blade.  The skin margins were undermined to an appropriate distance in all directions utilizing iris scissors.
Zygomaticofacial Flap Text: Given the location of the defect, shape of the defect and the proximity to free margins a zygomaticofacial flap was deemed most appropriate for repair.  Using a sterile surgical marker, the appropriate flap was drawn incorporating the defect and placing the expected incisions within the relaxed skin tension lines where possible. The area thus outlined was incised deep to adipose tissue with a #15 scalpel blade with preservation of a vascular pedicle.  The skin margins were undermined to an appropriate distance in all directions utilizing iris scissors.  The flap was then placed into the defect and anchored with interrupted buried subcutaneous sutures.
O-T Plasty Text: The defect edges were debeveled with a #15 scalpel blade.  Given the location of the defect, shape of the defect and the proximity to free margins an O-T plasty was deemed most appropriate.  Using a sterile surgical marker, an appropriate O-T plasty was drawn incorporating the defect and placing the expected incisions within the relaxed skin tension lines where possible.    The area thus outlined was incised deep to adipose tissue with a #15 scalpel blade.  The skin margins were undermined to an appropriate distance in all directions utilizing iris scissors.
Purse String (Intermediate) Text: Given the location of the defect and the characteristics of the surrounding skin a purse string intermediate closure was deemed most appropriate.  Undermining was performed circumferentially around the surgical defect.  A purse string suture was then placed and tightened.
Repair Type: Intermediate
Helical Rim Advancement Flap Text: The defect edges were debeveled with a #15 blade scalpel.  Given the location of the defect and the proximity to free margins (helical rim) a double helical rim advancement flap was deemed most appropriate.  Using a sterile surgical marker, the appropriate advancement flaps were drawn incorporating the defect and placing the expected incisions between the helical rim and antihelix where possible.  The area thus outlined was incised through and through with a #15 scalpel blade.  With a skin hook and iris scissors, the flaps were gently and sharply undermined and freed up.
Peng Advancement Flap Text: The defect edges were debeveled with a #15 scalpel blade.  Given the location of the defect, shape of the defect and the proximity to free margins a Peng advancement flap was deemed most appropriate.  Using a sterile surgical marker, an appropriate advancement flap was drawn incorporating the defect and placing the expected incisions within the relaxed skin tension lines where possible. The area thus outlined was incised deep to adipose tissue with a #15 scalpel blade.  The skin margins were undermined to an appropriate distance in all directions utilizing iris scissors.
Suture Removal: 10 days
Anesthesia Volume In Cc: 0
Muscle Hinge Flap Text: The defect edges were debeveled with a #15 scalpel blade.  Given the size, depth and location of the defect and the proximity to free margins a muscle hinge flap was deemed most appropriate.  Using a sterile surgical marker, an appropriate hinge flap was drawn incorporating the defect. The area thus outlined was incised with a #15 scalpel blade.  The skin margins were undermined to an appropriate distance in all directions utilizing iris scissors.
Post-Care Instructions: I reviewed with the patient in detail post-care instructions. Patient is not to engage in any heavy lifting, exercise, or swimming for the next 14 days. Should the patient develop any fevers, chills, bleeding, severe pain patient will contact the office immediately.
Did You Provide Opioid Counseling: No
Show Asc Variables: Yes
Debridement Text: The wound edges were debrided prior to proceeding with the closure to facilitate wound healing.
Intermediate Repair Preamble Text (Leave Blank If You Do Not Want): Undermining was performed with blunt dissection.
Suturegard Intro: Intraoperative tissue expansion was performed, utilizing the SUTUREGARD device, in order to reduce wound tension.
Advancement Flap (Double) Text: The defect edges were debeveled with a #15 scalpel blade.  Given the location of the defect and the proximity to free margins a double advancement flap was deemed most appropriate.  Using a sterile surgical marker, the appropriate advancement flaps were drawn incorporating the defect and placing the expected incisions within the relaxed skin tension lines where possible.    The area thus outlined was incised deep to adipose tissue with a #15 scalpel blade.  The skin margins were undermined to an appropriate distance in all directions utilizing iris scissors.
Slit Excision Additional Text (Leave Blank If You Do Not Want): A linear line was drawn on the skin overlying the lesion. An incision was made slowly until the lesion was visualized.  Once visualized, the lesion was removed with blunt dissection.
O-Z Flap Text: The defect edges were debeveled with a #15 scalpel blade.  Given the location of the defect, shape of the defect and the proximity to free margins an O-Z flap was deemed most appropriate.  Using a sterile surgical marker, an appropriate transposition flap was drawn incorporating the defect and placing the expected incisions within the relaxed skin tension lines where possible. The area thus outlined was incised deep to adipose tissue with a #15 scalpel blade.  The skin margins were undermined to an appropriate distance in all directions utilizing iris scissors.
Complex Repair And Double Advancement Flap Text: The defect edges were debeveled with a #15 scalpel blade.  The primary defect was closed partially with a complex linear closure.  Given the location of the remaining defect, shape of the defect and the proximity to free margins a double advancement flap was deemed most appropriate for complete closure of the defect.  Using a sterile surgical marker, an appropriate advancement flap was drawn incorporating the defect and placing the expected incisions within the relaxed skin tension lines where possible.    The area thus outlined was incised deep to adipose tissue with a #15 scalpel blade.  The skin margins were undermined to an appropriate distance in all directions utilizing iris scissors.
Primary Defect Width (In Cm): 1
Complex Repair And Tissue Cultured Epidermal Autograft Text: The defect edges were debeveled with a #15 scalpel blade.  The primary defect was closed partially with a complex linear closure.  Given the location of the defect, shape of the defect and the proximity to free margins an tissue cultured epidermal autograft was deemed most appropriate to repair the remaining defect.  The graft was trimmed to fit the size of the remaining defect.  The graft was then placed in the primary defect, oriented appropriately, and sutured into place.
Wound Care: Petrolatum
Complex Repair And Rotation Flap Text: The defect edges were debeveled with a #15 scalpel blade.  The primary defect was closed partially with a complex linear closure.  Given the location of the remaining defect, shape of the defect and the proximity to free margins a rotation flap was deemed most appropriate for complete closure of the defect.  Using a sterile surgical marker, an appropriate advancement flap was drawn incorporating the defect and placing the expected incisions within the relaxed skin tension lines where possible.    The area thus outlined was incised deep to adipose tissue with a #15 scalpel blade.  The skin margins were undermined to an appropriate distance in all directions utilizing iris scissors.
Deep Sutures: 5-0 Monocryl
Complex Repair And Z Plasty Text: The defect edges were debeveled with a #15 scalpel blade.  The primary defect was closed partially with a complex linear closure.  Given the location of the remaining defect, shape of the defect and the proximity to free margins a Z plasty was deemed most appropriate for complete closure of the defect.  Using a sterile surgical marker, an appropriate advancement flap was drawn incorporating the defect and placing the expected incisions within the relaxed skin tension lines where possible.    The area thus outlined was incised deep to adipose tissue with a #15 scalpel blade.  The skin margins were undermined to an appropriate distance in all directions utilizing iris scissors.
Hemigard Retention Suture: 2-0 Nylon
Banner Transposition Flap Text: The defect edges were debeveled with a #15 scalpel blade.  Given the location of the defect and the proximity to free margins a Banner transposition flap was deemed most appropriate.  Using a sterile surgical marker, an appropriate flap drawn around the defect. The area thus outlined was incised deep to adipose tissue with a #15 scalpel blade.  The skin margins were undermined to an appropriate distance in all directions utilizing iris scissors.
Split-Thickness Skin Graft Text: The defect edges were debeveled with a #15 scalpel blade.  Given the location of the defect, shape of the defect and the proximity to free margins a split thickness skin graft was deemed most appropriate.  Using a sterile surgical marker, the primary defect shape was transferred to the donor site. The split thickness graft was then harvested.  The skin graft was then placed in the primary defect and oriented appropriately.
Size Of Margin In Cm: 0.1
Helical Rim Text: The closure involved the helical rim.
Tissue Cultured Epidermal Autograft Text: The defect edges were debeveled with a #15 scalpel blade.  Given the location of the defect, shape of the defect and the proximity to free margins a tissue cultured epidermal autograft was deemed most appropriate.  The graft was then trimmed to fit the size of the defect.  The graft was then placed in the primary defect and oriented appropriately.
V-Y Plasty Text: The defect edges were debeveled with a #15 scalpel blade.  Given the location of the defect, shape of the defect and the proximity to free margins an V-Y advancement flap was deemed most appropriate.  Using a sterile surgical marker, an appropriate advancement flap was drawn incorporating the defect and placing the expected incisions within the relaxed skin tension lines where possible.    The area thus outlined was incised deep to adipose tissue with a #15 scalpel blade.  The skin margins were undermined to an appropriate distance in all directions utilizing iris scissors.
Alar Island Pedicle Flap Text: The defect edges were debeveled with a #15 scalpel blade.  Given the location of the defect, shape of the defect and the proximity to the alar rim an island pedicle advancement flap was deemed most appropriate.  Using a sterile surgical marker, an appropriate advancement flap was drawn incorporating the defect, outlining the appropriate donor tissue and placing the expected incisions within the nasal ala running parallel to the alar rim. The area thus outlined was incised with a #15 scalpel blade.  The skin margins were undermined minimally to an appropriate distance in all directions around the primary defect and laterally outward around the island pedicle utilizing iris scissors.  There was minimal undermining beneath the pedicle flap.
Interpolation Flap Text: A decision was made to reconstruct the defect utilizing an interpolation axial flap and a staged reconstruction.  A telfa template was made of the defect.  This telfa template was then used to outline the interpolation flap.  The donor area for the pedicle flap was then injected with anesthesia.  The flap was excised through the skin and subcutaneous tissue down to the layer of the underlying musculature.  The interpolation flap was carefully excised within this deep plane to maintain its blood supply.  The edges of the donor site were undermined.   The donor site was closed in a primary fashion.  The pedicle was then rotated into position and sutured.  Once the tube was sutured into place, adequate blood supply was confirmed with blanching and refill.  The pedicle was then wrapped with xeroform gauze and dressed appropriately with a telfa and gauze bandage to ensure continued blood supply and protect the attached pedicle.
O-Z Plasty Text: The defect edges were debeveled with a #15 scalpel blade.  Given the location of the defect, shape of the defect and the proximity to free margins an O-Z plasty (double transposition flap) was deemed most appropriate.  Using a sterile surgical marker, the appropriate transposition flaps were drawn incorporating the defect and placing the expected incisions within the relaxed skin tension lines where possible.    The area thus outlined was incised deep to adipose tissue with a #15 scalpel blade.  The skin margins were undermined to an appropriate distance in all directions utilizing iris scissors.  Hemostasis was achieved with electrocautery.  The flaps were then transposed into place, one clockwise and the other counterclockwise, and anchored with interrupted buried subcutaneous sutures.
Cheek Interpolation Flap Text: A decision was made to reconstruct the defect utilizing an interpolation axial flap and a staged reconstruction.  A telfa template was made of the defect.  This telfa template was then used to outline the Cheek Interpolation flap.  The donor area for the pedicle flap was then injected with anesthesia.  The flap was excised through the skin and subcutaneous tissue down to the layer of the underlying musculature.  The interpolation flap was carefully excised within this deep plane to maintain its blood supply.  The edges of the donor site were undermined.   The donor site was closed in a primary fashion.  The pedicle was then rotated into position and sutured.  Once the tube was sutured into place, adequate blood supply was confirmed with blanching and refill.  The pedicle was then wrapped with xeroform gauze and dressed appropriately with a telfa and gauze bandage to ensure continued blood supply and protect the attached pedicle.
Bilateral Helical Rim Advancement Flap Text: The defect edges were debeveled with a #15 blade scalpel.  Given the location of the defect and the proximity to free margins (helical rim) a bilateral helical rim advancement flap was deemed most appropriate.  Using a sterile surgical marker, the appropriate advancement flaps were drawn incorporating the defect and placing the expected incisions between the helical rim and antihelix where possible.  The area thus outlined was incised through and through with a #15 scalpel blade.  With a skin hook and iris scissors, the flaps were gently and sharply undermined and freed up.
Island Pedicle Flap Text: The defect edges were debeveled with a #15 scalpel blade.  Given the location of the defect, shape of the defect and the proximity to free margins an island pedicle advancement flap was deemed most appropriate.  Using a sterile surgical marker, an appropriate advancement flap was drawn incorporating the defect, outlining the appropriate donor tissue and placing the expected incisions within the relaxed skin tension lines where possible.    The area thus outlined was incised deep to adipose tissue with a #15 scalpel blade.  The skin margins were undermined to an appropriate distance in all directions around the primary defect and laterally outward around the island pedicle utilizing iris scissors.  There was minimal undermining beneath the pedicle flap.
Dermal Autograft Text: The defect edges were debeveled with a #15 scalpel blade.  Given the location of the defect, shape of the defect and the proximity to free margins a dermal autograft was deemed most appropriate.  Using a sterile surgical marker, the primary defect shape was transferred to the donor site. The area thus outlined was incised deep to adipose tissue with a #15 scalpel blade.  The harvested graft was then trimmed of adipose and epidermal tissue until only dermis was left.  The skin graft was then placed in the primary defect and oriented appropriately.
Lip Wedge Excision Repair Text: Given the location of the defect and the proximity to free margins a full thickness wedge repair was deemed most appropriate.  Using a sterile surgical marker, the appropriate repair was drawn incorporating the defect and placing the expected incisions perpendicular to the vermilion border.  The vermilion border was also meticulously outlined to ensure appropriate reapproximation during the repair.  The area thus outlined was incised through and through with a #15 scalpel blade.  The muscularis and dermis were reaproximated with deep sutures following hemostasis. Care was taken to realign the vermilion border before proceeding with the superficial closure.  Once the vermilion was realigned the superfical and mucosal closure was finished.
Size Of Lesion In Cm: 1.4
Hatchet Flap Text: The defect edges were debeveled with a #15 scalpel blade.  Given the location of the defect, shape of the defect and the proximity to free margins a hatchet flap was deemed most appropriate.  Using a sterile surgical marker, an appropriate hatchet flap was drawn incorporating the defect and placing the expected incisions within the relaxed skin tension lines where possible.    The area thus outlined was incised deep to adipose tissue with a #15 scalpel blade.  The skin margins were undermined to an appropriate distance in all directions utilizing iris scissors.
Mustarde Flap Text: The defect edges were debeveled with a #15 scalpel blade.  Given the size, depth and location of the defect and the proximity to free margins a Mustarde flap was deemed most appropriate.  Using a sterile surgical marker, an appropriate flap was drawn incorporating the defect. The area thus outlined was incised with a #15 scalpel blade.  The skin margins were undermined to an appropriate distance in all directions utilizing iris scissors.
Burow's Advancement Flap Text: The defect edges were debeveled with a #15 scalpel blade.  Given the location of the defect and the proximity to free margins a Burow's advancement flap was deemed most appropriate.  Using a sterile surgical marker, the appropriate advancement flap was drawn incorporating the defect and placing the expected incisions within the relaxed skin tension lines where possible.    The area thus outlined was incised deep to adipose tissue with a #15 scalpel blade.  The skin margins were undermined to an appropriate distance in all directions utilizing iris scissors.
Double O-Z Flap Text: The defect edges were debeveled with a #15 scalpel blade.  Given the location of the defect, shape of the defect and the proximity to free margins a Double O-Z flap was deemed most appropriate.  Using a sterile surgical marker, an appropriate transposition flap was drawn incorporating the defect and placing the expected incisions within the relaxed skin tension lines where possible. The area thus outlined was incised deep to adipose tissue with a #15 scalpel blade.  The skin margins were undermined to an appropriate distance in all directions utilizing iris scissors.
Home Suture Removal Text: Patient was provided a home suture removal kit and will remove their sutures at home.  If they have any questions or difficulties they will call the office.
Medical Necessity Information: It is in your best interest to select a reason for this procedure from the list below. All of these items fulfill various CMS LCD requirements except lesion extends to a margin.
Crescentic Complex Repair Preamble Text (Leave Blank If You Do Not Want): Extensive wide undermining was performed.
Medical Necessity Clause: This procedure was medically necessary because the lesion that was treated was:
Intermediate / Complex Repair - Final Wound Length In Cm: 2.6
Suturegard Body: The suture ends were repeatedly re-tightened and re-clamped to achieve the desired tissue expansion.
Excisional Biopsy Additional Text (Leave Blank If You Do Not Want): The margin was drawn around the clinically apparent lesion. An elliptical shape was then drawn on the skin incorporating the lesion and margins.  Incisions were then made along these lines to the appropriate tissue plane and the lesion was extirpated.
Anesthesia Type: 1% lidocaine with epinephrine and a 1:10 solution of 8.4% sodium bicarbonate
Vermilion Border Text: The closure involved the vermilion border.
Xenograft Text: The defect edges were debeveled with a #15 scalpel blade.  Given the location of the defect, shape of the defect and the proximity to free margins a xenograft was deemed most appropriate.  The graft was then trimmed to fit the size of the defect.  The graft was then placed in the primary defect and oriented appropriately.
Graft Donor Site Bandage (Optional-Leave Blank If You Don't Want In Note): Steri-strips and a pressure bandage were applied to the donor site.
Anesthesia Volume In Cc: 3.5
Complex Repair And Dorsal Nasal Flap Text: The defect edges were debeveled with a #15 scalpel blade.  The primary defect was closed partially with a complex linear closure.  Given the location of the remaining defect, shape of the defect and the proximity to free margins a dorsal nasal flap was deemed most appropriate for complete closure of the defect.  Using a sterile surgical marker, an appropriate flap was drawn incorporating the defect and placing the expected incisions within the relaxed skin tension lines where possible.    The area thus outlined was incised deep to adipose tissue with a #15 scalpel blade.  The skin margins were undermined to an appropriate distance in all directions utilizing iris scissors.
Complex Repair And Xenograft Text: The defect edges were debeveled with a #15 scalpel blade.  The primary defect was closed partially with a complex linear closure.  Given the location of the defect, shape of the defect and the proximity to free margins a xenograft was deemed most appropriate to repair the remaining defect.  The graft was trimmed to fit the size of the remaining defect.  The graft was then placed in the primary defect, oriented appropriately, and sutured into place.
Complex Repair And Rhombic Flap Text: The defect edges were debeveled with a #15 scalpel blade.  The primary defect was closed partially with a complex linear closure.  Given the location of the remaining defect, shape of the defect and the proximity to free margins a rhombic flap was deemed most appropriate for complete closure of the defect.  Using a sterile surgical marker, an appropriate advancement flap was drawn incorporating the defect and placing the expected incisions within the relaxed skin tension lines where possible.    The area thus outlined was incised deep to adipose tissue with a #15 scalpel blade.  The skin margins were undermined to an appropriate distance in all directions utilizing iris scissors.
Complex Repair And Modified Advancement Flap Text: The defect edges were debeveled with a #15 scalpel blade.  The primary defect was closed partially with a complex linear closure.  Given the location of the remaining defect, shape of the defect and the proximity to free margins a modified advancement flap was deemed most appropriate for complete closure of the defect.  Using a sterile surgical marker, an appropriate advancement flap was drawn incorporating the defect and placing the expected incisions within the relaxed skin tension lines where possible.    The area thus outlined was incised deep to adipose tissue with a #15 scalpel blade.  The skin margins were undermined to an appropriate distance in all directions utilizing iris scissors.
Path Notes (To The Dermatopathologist): Please check margins.
H Plasty Text: Given the location of the defect, shape of the defect and the proximity to free margins a H-plasty was deemed most appropriate for repair.  Using a sterile surgical marker, the appropriate advancement arms of the H-plasty were drawn incorporating the defect and placing the expected incisions within the relaxed skin tension lines where possible. The area thus outlined was incised deep to adipose tissue with a #15 scalpel blade. The skin margins were undermined to an appropriate distance in all directions utilizing iris scissors.  The opposing advancement arms were then advanced into place in opposite direction and anchored with interrupted buried subcutaneous sutures.
Burow's Graft Text: The defect edges were debeveled with a #15 scalpel blade.  Given the location of the defect, shape of the defect, the proximity to free margins and the presence of a standing cone deformity a Burow's skin graft was deemed most appropriate. The standing cone was removed and this tissue was then trimmed to the shape of the primary defect. The adipose tissue was also removed until only dermis and epidermis were left.  The skin margins of the secondary defect were undermined to an appropriate distance in all directions utilizing iris scissors.  The secondary defect was closed with interrupted buried subcutaneous sutures.  The skin edges were then re-apposed with running  sutures.  The skin graft was then placed in the primary defect and oriented appropriately.
Melolabial Interpolation Flap Text: A decision was made to reconstruct the defect utilizing an interpolation axial flap and a staged reconstruction.  A telfa template was made of the defect.  This telfa template was then used to outline the melolabial interpolation flap.  The donor area for the pedicle flap was then injected with anesthesia.  The flap was excised through the skin and subcutaneous tissue down to the layer of the underlying musculature.  The pedicle flap was carefully excised within this deep plane to maintain its blood supply.  The edges of the donor site were undermined.   The donor site was closed in a primary fashion.  The pedicle was then rotated into position and sutured.  Once the tube was sutured into place, adequate blood supply was confirmed with blanching and refill.  The pedicle was then wrapped with xeroform gauze and dressed appropriately with a telfa and gauze bandage to ensure continued blood supply and protect the attached pedicle.
Bilobed Flap Text: The defect edges were debeveled with a #15 scalpel blade.  Given the location of the defect and the proximity to free margins a bilobe flap was deemed most appropriate.  Using a sterile surgical marker, an appropriate bilobe flap drawn around the defect.    The area thus outlined was incised deep to adipose tissue with a #15 scalpel blade.  The skin margins were undermined to an appropriate distance in all directions utilizing iris scissors.
Double Island Pedicle Flap Text: The defect edges were debeveled with a #15 scalpel blade.  Given the location of the defect, shape of the defect and the proximity to free margins a double island pedicle advancement flap was deemed most appropriate.  Using a sterile surgical marker, an appropriate advancement flap was drawn incorporating the defect, outlining the appropriate donor tissue and placing the expected incisions within the relaxed skin tension lines where possible.    The area thus outlined was incised deep to adipose tissue with a #15 scalpel blade.  The skin margins were undermined to an appropriate distance in all directions around the primary defect and laterally outward around the island pedicle utilizing iris scissors.  There was minimal undermining beneath the pedicle flap.
Estimated Blood Loss (Cc): minimal
Excision Depth: adipose tissue
Eye Clamp Note Details: An eye clamp was used during the procedure.
Fusiform Excision Additional Text (Leave Blank If You Do Not Want): The margin was drawn around the clinically apparent lesion.  A fusiform shape was then drawn on the skin incorporating the lesion and margins.  Incisions were then made along these lines to the appropriate tissue plane and the lesion was extirpated.
Repair Performed By Another Provider Text (Leave Blank If You Do Not Want): After the tissue was excised the defect was repaired by another provider.
Orbicularis Oris Muscle Flap Text: The defect edges were debeveled with a #15 scalpel blade.  Given that the defect affected the competency of the oral sphincter an orbicularis oris muscle flap was deemed most appropriate to restore this competency and normal muscle function.  Using a sterile surgical marker, an appropriate flap was drawn incorporating the defect. The area thus outlined was incised with a #15 scalpel blade.
A-T Advancement Flap Text: The defect edges were debeveled with a #15 scalpel blade.  Given the location of the defect, shape of the defect and the proximity to free margins an A-T advancement flap was deemed most appropriate.  Using a sterile surgical marker, an appropriate advancement flap was drawn incorporating the defect and placing the expected incisions within the relaxed skin tension lines where possible.    The area thus outlined was incised deep to adipose tissue with a #15 scalpel blade.  The skin margins were undermined to an appropriate distance in all directions utilizing iris scissors.
Staged Advancement Flap Text: The defect edges were debeveled with a #15 scalpel blade.  Given the location of the defect, shape of the defect and the proximity to free margins a staged advancement flap was deemed most appropriate.  Using a sterile surgical marker, an appropriate advancement flap was drawn incorporating the defect and placing the expected incisions within the relaxed skin tension lines where possible. The area thus outlined was incised deep to adipose tissue with a #15 scalpel blade.  The skin margins were undermined to an appropriate distance in all directions utilizing iris scissors.
Mercedes Flap Text: The defect edges were debeveled with a #15 scalpel blade.  Given the location of the defect, shape of the defect and the proximity to free margins a Mercedes flap was deemed most appropriate.  Using a sterile surgical marker, an appropriate advancement flap was drawn incorporating the defect and placing the expected incisions within the relaxed skin tension lines where possible. The area thus outlined was incised deep to adipose tissue with a #15 scalpel blade.  The skin margins were undermined to an appropriate distance in all directions utilizing iris scissors.
V-Y Flap Text: The defect edges were debeveled with a #15 scalpel blade.  Given the location of the defect, shape of the defect and the proximity to free margins a V-Y flap was deemed most appropriate.  Using a sterile surgical marker, an appropriate advancement flap was drawn incorporating the defect and placing the expected incisions within the relaxed skin tension lines where possible.    The area thus outlined was incised deep to adipose tissue with a #15 scalpel blade.  The skin margins were undermined to an appropriate distance in all directions utilizing iris scissors.
Rotation Flap Text: The defect edges were debeveled with a #15 scalpel blade.  Given the location of the defect, shape of the defect and the proximity to free margins a rotation flap was deemed most appropriate.  Using a sterile surgical marker, an appropriate rotation flap was drawn incorporating the defect and placing the expected incisions within the relaxed skin tension lines where possible.    The area thus outlined was incised deep to adipose tissue with a #15 scalpel blade.  The skin margins were undermined to an appropriate distance in all directions utilizing iris scissors.
Perilesional Excision Additional Text (Leave Blank If You Do Not Want): The margin was drawn around the clinically apparent lesion. Incisions were then made along these lines to the appropriate tissue plane and the lesion was extirpated.
Chonodrocutaneous Helical Advancement Flap Text: The defect edges were debeveled with a #15 scalpel blade.  Given the location of the defect and the proximity to free margins a chondrocutaneous helical advancement flap was deemed most appropriate.  Using a sterile surgical marker, the appropriate advancement flap was drawn incorporating the defect and placing the expected incisions within the relaxed skin tension lines where possible.    The area thus outlined was incised deep to adipose tissue with a #15 scalpel blade.  The skin margins were undermined to an appropriate distance in all directions utilizing iris scissors.
Where Do You Want The Question To Include Opioid Counseling Located?: Case Summary Tab
Billing Type: Third-Party Bill
Nasal Turnover Hinge Flap Text: The defect edges were debeveled with a #15 scalpel blade.  Given the size, depth, location of the defect and the defect being full thickness a nasal turnover hinge flap was deemed most appropriate.  Using a sterile surgical marker, an appropriate hinge flap was drawn incorporating the defect. The area thus outlined was incised with a #15 scalpel blade. The flap was designed to recreate the nasal mucosal lining and the alar rim. The skin margins were undermined to an appropriate distance in all directions utilizing iris scissors.
Hemigard Intro: Due to skin fragility and wound tension, it was decided to use HEMIGARD adhesive retention suture devices to permit a linear closure. The skin was cleaned and dried for a 6cm distance away from the wound. Excessive hair, if present, was removed to allow for adhesion.
Complex Repair And Ftsg Text: The defect edges were debeveled with a #15 scalpel blade.  The primary defect was closed partially with a complex linear closure.  Given the location of the defect, shape of the defect and the proximity to free margins a full thickness skin graft was deemed most appropriate to repair the remaining defect.  The graft was trimmed to fit the size of the remaining defect.  The graft was then placed in the primary defect, oriented appropriately, and sutured into place.
Complex Repair And Skin Substitute Graft Text: The defect edges were debeveled with a #15 scalpel blade.  The primary defect was closed partially with a complex linear closure.  Given the location of the remaining defect, shape of the defect and the proximity to free margins a skin substitute graft was deemed most appropriate to repair the remaining defect.  The graft was trimmed to fit the size of the remaining defect.  The graft was then placed in the primary defect, oriented appropriately, and sutured into place.
Retention Suture Text: Retention sutures were placed to support the closure and prevent dehiscence.
Additional Anesthesia Volume In Cc: 6
Epidermal Closure Graft Donor Site (Optional): simple interrupted
Nostril Rim Text: The closure involved the nostril rim.
Mastoid Interpolation Flap Text: A decision was made to reconstruct the defect utilizing an interpolation axial flap and a staged reconstruction.  A telfa template was made of the defect.  This telfa template was then used to outline the mastoid interpolation flap.  The donor area for the pedicle flap was then injected with anesthesia.  The flap was excised through the skin and subcutaneous tissue down to the layer of the underlying musculature.  The pedicle flap was carefully excised within this deep plane to maintain its blood supply.  The edges of the donor site were undermined.   The donor site was closed in a primary fashion.  The pedicle was then rotated into position and sutured.  Once the tube was sutured into place, adequate blood supply was confirmed with blanching and refill.  The pedicle was then wrapped with xeroform gauze and dressed appropriately with a telfa and gauze bandage to ensure continued blood supply and protect the attached pedicle.
Detail Level: Detailed
Cartilage Graft Text: The defect edges were debeveled with a #15 scalpel blade.  Given the location of the defect, shape of the defect, the fact the defect involved a full thickness cartilage defect a cartilage graft was deemed most appropriate.  An appropriate donor site was identified, cleansed, and anesthetized. The cartilage graft was then harvested and transferred to the recipient site, oriented appropriately and then sutured into place.  The secondary defect was then repaired using a primary closure.
Complex Repair And A-T Advancement Flap Text: The defect edges were debeveled with a #15 scalpel blade.  The primary defect was closed partially with a complex linear closure.  Given the location of the remaining defect, shape of the defect and the proximity to free margins an A-T advancement flap was deemed most appropriate for complete closure of the defect.  Using a sterile surgical marker, an appropriate advancement flap was drawn incorporating the defect and placing the expected incisions within the relaxed skin tension lines where possible.    The area thus outlined was incised deep to adipose tissue with a #15 scalpel blade.  The skin margins were undermined to an appropriate distance in all directions utilizing iris scissors.
Complex Repair And Transposition Flap Text: The defect edges were debeveled with a #15 scalpel blade.  The primary defect was closed partially with a complex linear closure.  Given the location of the remaining defect, shape of the defect and the proximity to free margins a transposition flap was deemed most appropriate for complete closure of the defect.  Using a sterile surgical marker, an appropriate advancement flap was drawn incorporating the defect and placing the expected incisions within the relaxed skin tension lines where possible.    The area thus outlined was incised deep to adipose tissue with a #15 scalpel blade.  The skin margins were undermined to an appropriate distance in all directions utilizing iris scissors.
Hemostasis: Electrodesiccation
Island Pedicle Flap-Requiring Vessel Identification Text: The defect edges were debeveled with a #15 scalpel blade.  Given the location of the defect, shape of the defect and the proximity to free margins an island pedicle advancement flap was deemed most appropriate.  Using a sterile surgical marker, an appropriate advancement flap was drawn, based on the axial vessel mentioned above, incorporating the defect, outlining the appropriate donor tissue and placing the expected incisions within the relaxed skin tension lines where possible.    The area thus outlined was incised deep to adipose tissue with a #15 scalpel blade.  The skin margins were undermined to an appropriate distance in all directions around the primary defect and laterally outward around the island pedicle utilizing iris scissors.  There was minimal undermining beneath the pedicle flap.
W Plasty Text: The lesion was extirpated to the level of the fat with a #15 scalpel blade.  Given the location of the defect, shape of the defect and the proximity to free margins a W-plasty was deemed most appropriate for repair.  Using a sterile surgical marker, the appropriate transposition arms of the W-plasty were drawn incorporating the defect and placing the expected incisions within the relaxed skin tension lines where possible.    The area thus outlined was incised deep to adipose tissue with a #15 scalpel blade.  The skin margins were undermined to an appropriate distance in all directions utilizing iris scissors.  The opposing transposition arms were then transposed into place in opposite direction and anchored with interrupted buried subcutaneous sutures.
Rhomboid Transposition Flap Text: The defect edges were debeveled with a #15 scalpel blade.  Given the location of the defect and the proximity to free margins a rhomboid transposition flap was deemed most appropriate.  Using a sterile surgical marker, an appropriate rhomboid flap was drawn incorporating the defect.    The area thus outlined was incised deep to adipose tissue with a #15 scalpel blade.  The skin margins were undermined to an appropriate distance in all directions utilizing iris scissors.
Bilobed Transposition Flap Text: The defect edges were debeveled with a #15 scalpel blade.  Given the location of the defect and the proximity to free margins a bilobed transposition flap was deemed most appropriate.  Using a sterile surgical marker, an appropriate bilobe flap drawn around the defect.    The area thus outlined was incised deep to adipose tissue with a #15 scalpel blade.  The skin margins were undermined to an appropriate distance in all directions utilizing iris scissors.
Length To Time In Minutes Device Was In Place: 10
Melolabial Transposition Flap Text: The defect edges were debeveled with a #15 scalpel blade.  Given the location of the defect and the proximity to free margins a melolabial flap was deemed most appropriate.  Using a sterile surgical marker, an appropriate melolabial transposition flap was drawn incorporating the defect.    The area thus outlined was incised deep to adipose tissue with a #15 scalpel blade.  The skin margins were undermined to an appropriate distance in all directions utilizing iris scissors.
Excision Method: Perilesional
Eliptical Excision Additional Text (Leave Blank If You Do Not Want): The margin was drawn around the clinically apparent lesion.  An elliptical shape was then drawn on the skin incorporating the lesion and margins.  Incisions were then made along these lines to the appropriate tissue plane and the lesion was extirpated.
Modified Advancement Flap Text: The defect edges were debeveled with a #15 scalpel blade.  Given the location of the defect, shape of the defect and the proximity to free margins a modified advancement flap was deemed most appropriate.  Using a sterile surgical marker, an appropriate advancement flap was drawn incorporating the defect and placing the expected incisions within the relaxed skin tension lines where possible.    The area thus outlined was incised deep to adipose tissue with a #15 scalpel blade.  The skin margins were undermined to an appropriate distance in all directions utilizing iris scissors.
Star Wedge Flap Text: The defect edges were debeveled with a #15 scalpel blade.  Given the location of the defect, shape of the defect and the proximity to free margins a star wedge flap was deemed most appropriate.  Using a sterile surgical marker, an appropriate rotation flap was drawn incorporating the defect and placing the expected incisions within the relaxed skin tension lines where possible. The area thus outlined was incised deep to adipose tissue with a #15 scalpel blade.  The skin margins were undermined to an appropriate distance in all directions utilizing iris scissors.
No Repair - Repaired With Adjacent Surgical Defect Text (Leave Blank If You Do Not Want): After the excision the defect was repaired concurrently with another surgical defect which was in close approximation.
O-T Advancement Flap Text: The defect edges were debeveled with a #15 scalpel blade.  Given the location of the defect, shape of the defect and the proximity to free margins an O-T advancement flap was deemed most appropriate.  Using a sterile surgical marker, an appropriate advancement flap was drawn incorporating the defect and placing the expected incisions within the relaxed skin tension lines where possible.    The area thus outlined was incised deep to adipose tissue with a #15 scalpel blade.  The skin margins were undermined to an appropriate distance in all directions utilizing iris scissors.
Complex Repair And Double M Plasty Text: The defect edges were debeveled with a #15 scalpel blade.  The primary defect was closed partially with a complex linear closure.  Given the location of the remaining defect, shape of the defect and the proximity to free margins a double M plasty was deemed most appropriate for complete closure of the defect.  Using a sterile surgical marker, an appropriate advancement flap was drawn incorporating the defect and placing the expected incisions within the relaxed skin tension lines where possible.    The area thus outlined was incised deep to adipose tissue with a #15 scalpel blade.  The skin margins were undermined to an appropriate distance in all directions utilizing iris scissors.
Complex Repair And Bilobe Flap Text: The defect edges were debeveled with a #15 scalpel blade.  The primary defect was closed partially with a complex linear closure.  Given the location of the remaining defect, shape of the defect and the proximity to free margins a bilobe flap was deemed most appropriate for complete closure of the defect.  Using a sterile surgical marker, an appropriate advancement flap was drawn incorporating the defect and placing the expected incisions within the relaxed skin tension lines where possible.    The area thus outlined was incised deep to adipose tissue with a #15 scalpel blade.  The skin margins were undermined to an appropriate distance in all directions utilizing iris scissors.
Complex Repair And Epidermal Autograft Text: The defect edges were debeveled with a #15 scalpel blade.  The primary defect was closed partially with a complex linear closure.  Given the location of the defect, shape of the defect and the proximity to free margins an epidermal autograft was deemed most appropriate to repair the remaining defect.  The graft was trimmed to fit the size of the remaining defect.  The graft was then placed in the primary defect, oriented appropriately, and sutured into place.
Purse String (Simple) Text: Given the location of the defect and the characteristics of the surrounding skin a purse string simple closure was deemed most appropriate.  Undermining was performed circumferentially around the surgical defect.  A purse string suture was then placed and tightened.
Positioning (Leave Blank If You Do Not Want): The patient was placed in a comfortable position exposing the surgical site.
Crescentic Advancement Flap Text: The defect edges were debeveled with a #15 scalpel blade.  Given the location of the defect and the proximity to free margins a crescentic advancement flap was deemed most appropriate.  Using a sterile surgical marker, the appropriate advancement flap was drawn incorporating the defect and placing the expected incisions within the relaxed skin tension lines where possible.    The area thus outlined was incised deep to adipose tissue with a #15 scalpel blade.  The skin margins were undermined to an appropriate distance in all directions utilizing iris scissors.
Advancement-Rotation Flap Text: The defect edges were debeveled with a #15 scalpel blade.  Given the location of the defect, shape of the defect and the proximity to free margins an advancement-rotation flap was deemed most appropriate.  Using a sterile surgical marker, an appropriate flap was drawn incorporating the defect and placing the expected incisions within the relaxed skin tension lines where possible. The area thus outlined was incised deep to adipose tissue with a #15 scalpel blade.  The skin margins were undermined to an appropriate distance in all directions utilizing iris scissors.
Anesthesia Type: 1% lidocaine with 1:100,000 epinephrine and a 1:10 solution of 8.4% sodium bicarbonate
Spiral Flap Text: The defect edges were debeveled with a #15 scalpel blade.  Given the location of the defect, shape of the defect and the proximity to free margins a spiral flap was deemed most appropriate.  Using a sterile surgical marker, an appropriate rotation flap was drawn incorporating the defect and placing the expected incisions within the relaxed skin tension lines where possible. The area thus outlined was incised deep to adipose tissue with a #15 scalpel blade.  The skin margins were undermined to an appropriate distance in all directions utilizing iris scissors.
Nasalis-Muscle-Based Myocutaneous Island Pedicle Flap Text: Using a #15 blade, an incision was made around the donor flap to the level of the nasalis muscle. Wide lateral undermining was then performed in both the subcutaneous plane above the nasalis muscle, and in a submuscular plane just above periosteum. This allowed the formation of a free nasalis muscle axial pedicle (based on the angular artery) which was still attached to the actual cutaneous flap, increasing its mobility and vascular viability. Hemostasis was obtained with pinpoint electrocoagulation. The flap was mobilized into position and the pivotal anchor points positioned and stabilized with buried interrupted sutures. Subcutaneous and dermal tissues were closed in a multilayered fashion with sutures. Tissue redundancies were excised, and the epidermal edges were apposed without significant tension and sutured with sutures.
Hemigard Postcare Instructions: The HEMIGARD strips are to remain completely dry for at least 5-7 days.
Complex Repair And V-Y Plasty Text: The defect edges were debeveled with a #15 scalpel blade.  The primary defect was closed partially with a complex linear closure.  Given the location of the remaining defect, shape of the defect and the proximity to free margins a V-Y plasty was deemed most appropriate for complete closure of the defect.  Using a sterile surgical marker, an appropriate advancement flap was drawn incorporating the defect and placing the expected incisions within the relaxed skin tension lines where possible.    The area thus outlined was incised deep to adipose tissue with a #15 scalpel blade.  The skin margins were undermined to an appropriate distance in all directions utilizing iris scissors.
Retention Suture Bite Size: 3 mm
Complex Repair And Burow's Graft Text: The defect edges were debeveled with a #15 scalpel blade.  The primary defect was closed partially with a complex linear closure.  Given the location of the defect, shape of the defect, the proximity to free margins and the presence of a standing cone deformity a Burow's graft was deemed most appropriate to repair the remaining defect.  The graft was trimmed to fit the size of the remaining defect.  The graft was then placed in the primary defect, oriented appropriately, and sutured into place.
Scalpel Size: 15 blade
Composite Graft Text: The defect edges were debeveled with a #15 scalpel blade.  Given the location of the defect, shape of the defect, the proximity to free margins and the fact the defect was full thickness a composite graft was deemed most appropriate.  The defect was outline and then transferred to the donor site.  A full thickness graft was then excised from the donor site. The graft was then placed in the primary defect, oriented appropriately and then sutured into place.  The secondary defect was then repaired using a primary closure.
Z Plasty Text: The lesion was extirpated to the level of the fat with a #15 scalpel blade.  Given the location of the defect, shape of the defect and the proximity to free margins a Z-plasty was deemed most appropriate for repair.  Using a sterile surgical marker, the appropriate transposition arms of the Z-plasty were drawn incorporating the defect and placing the expected incisions within the relaxed skin tension lines where possible.    The area thus outlined was incised deep to adipose tissue with a #15 scalpel blade.  The skin margins were undermined to an appropriate distance in all directions utilizing iris scissors.  The opposing transposition arms were then transposed into place in opposite direction and anchored with interrupted buried subcutaneous sutures.
Posterior Auricular Interpolation Flap Text: A decision was made to reconstruct the defect utilizing an interpolation axial flap and a staged reconstruction.  A telfa template was made of the defect.  This telfa template was then used to outline the posterior auricular interpolation flap.  The donor area for the pedicle flap was then injected with anesthesia.  The flap was excised through the skin and subcutaneous tissue down to the layer of the underlying musculature.  The pedicle flap was carefully excised within this deep plane to maintain its blood supply.  The edges of the donor site were undermined.   The donor site was closed in a primary fashion.  The pedicle was then rotated into position and sutured.  Once the tube was sutured into place, adequate blood supply was confirmed with blanching and refill.  The pedicle was then wrapped with xeroform gauze and dressed appropriately with a telfa and gauze bandage to ensure continued blood supply and protect the attached pedicle.
Complex Repair And O-T Advancement Flap Text: The defect edges were debeveled with a #15 scalpel blade.  The primary defect was closed partially with a complex linear closure.  Given the location of the remaining defect, shape of the defect and the proximity to free margins an O-T advancement flap was deemed most appropriate for complete closure of the defect.  Using a sterile surgical marker, an appropriate advancement flap was drawn incorporating the defect and placing the expected incisions within the relaxed skin tension lines where possible.    The area thus outlined was incised deep to adipose tissue with a #15 scalpel blade.  The skin margins were undermined to an appropriate distance in all directions utilizing iris scissors.
Trilobed Flap Text: The defect edges were debeveled with a #15 scalpel blade.  Given the location of the defect and the proximity to free margins a trilobed flap was deemed most appropriate.  Using a sterile surgical marker, an appropriate trilobed flap drawn around the defect.    The area thus outlined was incised deep to adipose tissue with a #15 scalpel blade.  The skin margins were undermined to an appropriate distance in all directions utilizing iris scissors.
Bi-Rhombic Flap Text: The defect edges were debeveled with a #15 scalpel blade.  Given the location of the defect and the proximity to free margins a bi-rhombic flap was deemed most appropriate.  Using a sterile surgical marker, an appropriate rhombic flap was drawn incorporating the defect. The area thus outlined was incised deep to adipose tissue with a #15 scalpel blade.  The skin margins were undermined to an appropriate distance in all directions utilizing iris scissors.
Keystone Flap Text: The defect edges were debeveled with a #15 scalpel blade.  Given the location of the defect, shape of the defect a keystone flap was deemed most appropriate.  Using a sterile surgical marker, an appropriate keystone flap was drawn incorporating the defect, outlining the appropriate donor tissue and placing the expected incisions within the relaxed skin tension lines where possible. The area thus outlined was incised deep to adipose tissue with a #15 scalpel blade.  The skin margins were undermined to an appropriate distance in all directions around the primary defect and laterally outward around the flap utilizing iris scissors.
Transposition Flap Text: The defect edges were debeveled with a #15 scalpel blade.  Given the location of the defect and the proximity to free margins a transposition flap was deemed most appropriate.  Using a sterile surgical marker, an appropriate transposition flap was drawn incorporating the defect.    The area thus outlined was incised deep to adipose tissue with a #15 scalpel blade.  The skin margins were undermined to an appropriate distance in all directions utilizing iris scissors.
Rhombic Flap Text: The defect edges were debeveled with a #15 scalpel blade.  Given the location of the defect and the proximity to free margins a rhombic flap was deemed most appropriate.  Using a sterile surgical marker, an appropriate rhombic flap was drawn incorporating the defect.    The area thus outlined was incised deep to adipose tissue with a #15 scalpel blade.  The skin margins were undermined to an appropriate distance in all directions utilizing iris scissors.
Information: Selecting Yes will display possible errors in your note based on the variables you have selected. This validation is only offered as a suggestion for you. PLEASE NOTE THAT THE VALIDATION TEXT WILL BE REMOVED WHEN YOU FINALIZE YOUR NOTE. IF YOU WANT TO FAX A PRELIMINARY NOTE YOU WILL NEED TO TOGGLE THIS TO 'NO' IF YOU DO NOT WANT IT IN YOUR FAXED NOTE.
O-L Flap Text: The defect edges were debeveled with a #15 scalpel blade.  Given the location of the defect, shape of the defect and the proximity to free margins an O-L flap was deemed most appropriate.  Using a sterile surgical marker, an appropriate advancement flap was drawn incorporating the defect and placing the expected incisions within the relaxed skin tension lines where possible.    The area thus outlined was incised deep to adipose tissue with a #15 scalpel blade.  The skin margins were undermined to an appropriate distance in all directions utilizing iris scissors.
Mucosal Advancement Flap Text: Given the location of the defect, shape of the defect and the proximity to free margins a mucosal advancement flap was deemed most appropriate. Incisions were made with a 15 blade scalpel in the appropriate fashion along the cutaneous vermillion border and the mucosal lip. The remaining actinically damaged mucosal tissue was excised.  The mucosal advancement flap was then elevated to the gingival sulcus with care taken to preserve the neurovascular structures and advanced into the primary defect. Care was taken to ensure that precise realignment of the vermilion border was achieved.
Saucerization Excision Additional Text (Leave Blank If You Do Not Want): The margin was drawn around the clinically apparent lesion.  Incisions were then made along these lines, in a tangential fashion, to the appropriate tissue plane and the lesion was extirpated.
Advancement Flap (Single) Text: The defect edges were debeveled with a #15 scalpel blade.  Given the location of the defect and the proximity to free margins a single advancement flap was deemed most appropriate.  Using a sterile surgical marker, an appropriate advancement flap was drawn incorporating the defect and placing the expected incisions within the relaxed skin tension lines where possible.    The area thus outlined was incised deep to adipose tissue with a #15 scalpel blade.  The skin margins were undermined to an appropriate distance in all directions utilizing iris scissors.
Complex Repair And Single Advancement Flap Text: The defect edges were debeveled with a #15 scalpel blade.  The primary defect was closed partially with a complex linear closure.  Given the location of the remaining defect, shape of the defect and the proximity to free margins a single advancement flap was deemed most appropriate for complete closure of the defect.  Using a sterile surgical marker, an appropriate advancement flap was drawn incorporating the defect and placing the expected incisions within the relaxed skin tension lines where possible.    The area thus outlined was incised deep to adipose tissue with a #15 scalpel blade.  The skin margins were undermined to an appropriate distance in all directions utilizing iris scissors.
Complex Repair And Melolabial Flap Text: The defect edges were debeveled with a #15 scalpel blade.  The primary defect was closed partially with a complex linear closure.  Given the location of the remaining defect, shape of the defect and the proximity to free margins a melolabial flap was deemed most appropriate for complete closure of the defect.  Using a sterile surgical marker, an appropriate advancement flap was drawn incorporating the defect and placing the expected incisions within the relaxed skin tension lines where possible.    The area thus outlined was incised deep to adipose tissue with a #15 scalpel blade.  The skin margins were undermined to an appropriate distance in all directions utilizing iris scissors.
Primary Defect Length (In Cm): 1.6
Complex Repair And W Plasty Text: The defect edges were debeveled with a #15 scalpel blade.  The primary defect was closed partially with a complex linear closure.  Given the location of the remaining defect, shape of the defect and the proximity to free margins a W plasty was deemed most appropriate for complete closure of the defect.  Using a sterile surgical marker, an appropriate advancement flap was drawn incorporating the defect and placing the expected incisions within the relaxed skin tension lines where possible.    The area thus outlined was incised deep to adipose tissue with a #15 scalpel blade.  The skin margins were undermined to an appropriate distance in all directions utilizing iris scissors.
Complex Repair And Dermal Autograft Text: The defect edges were debeveled with a #15 scalpel blade.  The primary defect was closed partially with a complex linear closure.  Given the location of the defect, shape of the defect and the proximity to free margins an dermal autograft was deemed most appropriate to repair the remaining defect.  The graft was trimmed to fit the size of the remaining defect.  The graft was then placed in the primary defect, oriented appropriately, and sutured into place.
Island Pedicle Flap With Canthal Suspension Text: The defect edges were debeveled with a #15 scalpel blade.  Given the location of the defect, shape of the defect and the proximity to free margins an island pedicle advancement flap was deemed most appropriate.  Using a sterile surgical marker, an appropriate advancement flap was drawn incorporating the defect, outlining the appropriate donor tissue and placing the expected incisions within the relaxed skin tension lines where possible. The area thus outlined was incised deep to adipose tissue with a #15 scalpel blade.  The skin margins were undermined to an appropriate distance in all directions around the primary defect and laterally outward around the island pedicle utilizing iris scissors.  There was minimal undermining beneath the pedicle flap. A suspension suture was placed in the canthal tendon to prevent tension and prevent ectropion.
Dressing: pressure dressing with telfa
Double O-Z Plasty Text: The defect edges were debeveled with a #15 scalpel blade.  Given the location of the defect, shape of the defect and the proximity to free margins a Double O-Z plasty (double transposition flap) was deemed most appropriate.  Using a sterile surgical marker, the appropriate transposition flaps were drawn incorporating the defect and placing the expected incisions within the relaxed skin tension lines where possible. The area thus outlined was incised deep to adipose tissue with a #15 scalpel blade.  The skin margins were undermined to an appropriate distance in all directions utilizing iris scissors.  Hemostasis was achieved with electrocautery.  The flaps were then transposed into place, one clockwise and the other counterclockwise, and anchored with interrupted buried subcutaneous sutures.
Epidermal Sutures: 6-0 Surgipro
Ear Star Wedge Flap Text: The defect edges were debeveled with a #15 blade scalpel.  Given the location of the defect and the proximity to free margins (helical rim) an ear star wedge flap was deemed most appropriate.  Using a sterile surgical marker, the appropriate flap was drawn incorporating the defect and placing the expected incisions between the helical rim and antihelix where possible.  The area thus outlined was incised through and through with a #15 scalpel blade.
Epidermal Closure: running and interrupted
X Size Of Lesion In Cm (Optional): 0.8
Skin Substitute Text: The defect edges were debeveled with a #15 scalpel blade.  Given the location of the defect, shape of the defect and the proximity to free margins a skin substitute graft was deemed most appropriate.  The graft material was trimmed to fit the size of the defect. The graft was then placed in the primary defect and oriented appropriately.
Cheek-To-Nose Interpolation Flap Text: A decision was made to reconstruct the defect utilizing an interpolation axial flap and a staged reconstruction.  A telfa template was made of the defect.  This telfa template was then used to outline the Cheek-To-Nose Interpolation flap.  The donor area for the pedicle flap was then injected with anesthesia.  The flap was excised through the skin and subcutaneous tissue down to the layer of the underlying musculature.  The interpolation flap was carefully excised within this deep plane to maintain its blood supply.  The edges of the donor site were undermined.   The donor site was closed in a primary fashion.  The pedicle was then rotated into position and sutured.  Once the tube was sutured into place, adequate blood supply was confirmed with blanching and refill.  The pedicle was then wrapped with xeroform gauze and dressed appropriately with a telfa and gauze bandage to ensure continued blood supply and protect the attached pedicle.
Undermining Type: Entire Wound
Ftsg Text: The defect edges were debeveled with a #15 scalpel blade.  Given the location of the defect, shape of the defect and the proximity to free margins a full thickness skin graft was deemed most appropriate.  Using a sterile surgical marker, the primary defect shape was transferred to the donor site. The area thus outlined was incised deep to adipose tissue with a #15 scalpel blade.  The harvested graft was then trimmed of adipose tissue until only dermis and epidermis was left.  The skin margins of the secondary defect were undermined to an appropriate distance in all directions utilizing iris scissors.  The secondary defect was closed with interrupted buried subcutaneous sutures.  The skin edges were then re-apposed with running  sutures.  The skin graft was then placed in the primary defect and oriented appropriately.

## 2021-10-14 NOTE — PROCEDURE: MIPS QUALITY
Quality 110: Preventive Care And Screening: Influenza Immunization: Influenza Immunization Administered during Influenza season
Quality 265: Biopsy Follow-Up: Biopsy results reviewed, communicated, tracked, and documented
Quality 130: Documentation Of Current Medications In The Medical Record: Current Medications Documented
Detail Level: Detailed
Quality 226: Preventive Care And Screening: Tobacco Use: Screening And Cessation Intervention: Patient screened for tobacco use and is an ex/non-smoker
Quality 431: Preventive Care And Screening: Unhealthy Alcohol Use - Screening: Patient not identified as an unhealthy alcohol user when screened for unhealthy alcohol use using a systematic screening method

## 2021-10-27 ENCOUNTER — APPOINTMENT (OUTPATIENT)
Dept: URBAN - METROPOLITAN AREA CLINIC 255 | Age: 71
Setting detail: DERMATOLOGY
End: 2021-11-03

## 2021-10-27 DIAGNOSIS — Z48.02 ENCOUNTER FOR REMOVAL OF SUTURES: ICD-10-CM

## 2021-10-27 DIAGNOSIS — N52.9 ERECTILE DYSFUNCTION, UNSPECIFIED ERECTILE DYSFUNCTION TYPE: ICD-10-CM

## 2021-10-27 PROCEDURE — OTHER DIAGNOSIS COMMENT: OTHER

## 2021-10-27 PROCEDURE — OTHER MIPS QUALITY: OTHER

## 2021-10-27 PROCEDURE — OTHER COUNSELING: OTHER

## 2021-10-27 PROCEDURE — OTHER SUTURE REMOVAL (GLOBAL PERIOD): OTHER

## 2021-10-27 RX ORDER — TADALAFIL 20 MG/1
TABLET ORAL
Qty: 30 TABLET | Refills: 1 | Status: SHIPPED | OUTPATIENT
Start: 2021-10-27 | End: 2021-12-14

## 2021-10-27 ASSESSMENT — LOCATION SIMPLE DESCRIPTION DERM: LOCATION SIMPLE: NECK

## 2021-10-27 ASSESSMENT — LOCATION ZONE DERM: LOCATION ZONE: NECK

## 2021-10-27 ASSESSMENT — LOCATION DETAILED DESCRIPTION DERM: LOCATION DETAILED: RIGHT SUPERIOR LATERAL NECK

## 2021-10-27 NOTE — PROCEDURE: SUTURE REMOVAL (GLOBAL PERIOD)
Body Location Override (Optional - Billing Will Still Be Based On Selected Body Map Location If Applicable): Right Infra-Auricular Neck
Detail Level: Detailed
Add 31199 Cpt? (Important Note: In 2017 The Use Of 28810 Is Being Tracked By Cms To Determine Future Global Period Reimbursement For Global Periods): no

## 2021-10-27 NOTE — PROCEDURE: DIAGNOSIS COMMENT
Comment: S/P Excision of a inflamed cyst  (10/14/2021)
Render Risk Assessment In Note?: yes
Detail Level: Simple

## 2021-10-28 ENCOUNTER — MYC MEDICAL ADVICE (OUTPATIENT)
Dept: INTERNAL MEDICINE | Facility: CLINIC | Age: 71
End: 2021-10-28

## 2021-11-15 ENCOUNTER — OFFICE VISIT (OUTPATIENT)
Dept: URGENT CARE | Facility: URGENT CARE | Age: 71
End: 2021-11-15
Payer: COMMERCIAL

## 2021-11-15 VITALS
WEIGHT: 193.2 LBS | OXYGEN SATURATION: 98 % | BODY MASS INDEX: 27.72 KG/M2 | HEART RATE: 69 BPM | SYSTOLIC BLOOD PRESSURE: 158 MMHG | DIASTOLIC BLOOD PRESSURE: 85 MMHG

## 2021-11-15 DIAGNOSIS — N63.0 LUMP OR MASS IN BREAST: Primary | ICD-10-CM

## 2021-11-15 DIAGNOSIS — N64.4 PAIN OF LEFT BREAST: ICD-10-CM

## 2021-11-15 PROCEDURE — 99214 OFFICE O/P EST MOD 30 MIN: CPT | Performed by: PHYSICIAN ASSISTANT

## 2021-11-15 NOTE — PROGRESS NOTES
Assessment & Plan     Lump or mass in breast  Localized tenderness along the left lateral breast area  Pain and tenderness have been present for 1 month  - Breast Provider Referral; Future    Pain of left breast  Referral to the breast center to have imaging, including mammogram and ultrasound done  Referral placed  Patient scheduled for visit and evaluation  - Breast Provider Referral; Future    Review of external notes as documented elsewhere in note  [id number]}     CONSULTATION/REFERRAL to Breast Center      AROLDO Nguyen [facility] HALEY Lennon is a 71 year old who presents for the following health issues     HPI     Left breast lump and tenderness  Present for 1 month    Review of Systems   Constitutional, HEENT, cardiovascular, pulmonary, gi and gu systems are negative, except as otherwise noted.      Objective    BP (!) 158/85 (BP Location: Right arm, Patient Position: Sitting, Cuff Size: Adult Regular)   Pulse 69   Wt 87.6 kg (193 lb 3.2 oz)   SpO2 98%   BMI 27.72 kg/m    Body mass index is 27.72 kg/m .  Physical Exam   GENERAL: healthy, alert and no distress  NECK: no adenopathy, no asymmetry, masses, or scars and thyroid normal to palpation  RESP: lungs clear to auscultation - no rales, rhonchi or wheezes  BREAST: tenderness left lower lateral breast   CV: regular rate and rhythm, normal S1 S2, no S3 or S4, no murmur, click or rub, no peripheral edema and peripheral pulses strong  SKIN: no suspicious lesions or rashes

## 2021-11-22 ENCOUNTER — HOSPITAL ENCOUNTER (OUTPATIENT)
Dept: ULTRASOUND IMAGING | Facility: CLINIC | Age: 71
Discharge: HOME | End: 2021-11-22
Attending: INTERNAL MEDICINE | Admitting: INTERNAL MEDICINE
Payer: COMMERCIAL

## 2021-11-22 DIAGNOSIS — Z13.6 ENCOUNTER FOR ABDOMINAL AORTIC ANEURYSM (AAA) SCREENING: ICD-10-CM

## 2021-11-22 PROCEDURE — 76706 US ABDL AORTA SCREEN AAA: CPT

## 2021-12-03 ENCOUNTER — HOSPITAL ENCOUNTER (OUTPATIENT)
Dept: MAMMOGRAPHY | Facility: CLINIC | Age: 71
End: 2021-12-03
Attending: PHYSICIAN ASSISTANT
Payer: COMMERCIAL

## 2021-12-03 DIAGNOSIS — N63.20 LEFT BREAST LUMP: ICD-10-CM

## 2021-12-03 DIAGNOSIS — N64.4 BREAST PAIN, LEFT: ICD-10-CM

## 2021-12-03 PROCEDURE — 76642 ULTRASOUND BREAST LIMITED: CPT | Mod: LT

## 2021-12-03 PROCEDURE — 77066 DX MAMMO INCL CAD BI: CPT

## 2021-12-09 NOTE — PROGRESS NOTES
"SUBJECTIVE:   Saji Morrison is a 71 year old male who presents for Preventive Visit.  Patient has been advised of split billing requirements and indicates understanding: Yes   Are you in the first 12 months of your Medicare coverage?  No    Healthy Habits:     In general, how would you rate your overall health?  Good    Frequency of exercise:  4-5 days/week    Duration of exercise:  45-60 minutes    Do you usually eat at least 4 servings of fruit and vegetables a day, include whole grains    & fiber and avoid regularly eating high fat or \"junk\" foods?  No    Taking medications regularly:  Yes    Medication side effects:  None    Ability to successfully perform activities of daily living:  No assistance needed    Home Safety:  No safety concerns identified    Hearing Impairment:  No hearing concerns    In the past 6 months, have you been bothered by leaking of urine?  No    In general, how would you rate your overall mental or emotional health?  Good      PHQ-2 Total Score: 0    Additional concerns today:  No    Do you feel safe in your environment? Yes    Have you ever done Advance Care Planning? (For example, a Health Directive, POLST, or a discussion with a medical provider or your loved ones about your wishes): No, advance care planning information given to patient to review.  Patient declined advance care planning discussion at this time.     Fall risk:  low    Cognitive Screening   1) Repeat 3 items (Leader, Season, Table)    2) Clock draw: NORMAL  3) 3 item recall: Recalls 3 objects  Results: NORMAL clock, 3 items recalled: COGNITIVE IMPAIRMENT LESS LIKELY    Mini-CogTM Copyright DONAVON Herrera. Licensed by the author for use in [facility]; reprinted with permission (daja@.[email or web address]). All rights reserved.      Do you have sleep apnea, excessive snoring or daytime drowsiness?: no    Reviewed and updated as needed this visit by clinical staff              Reviewed and updated as needed this visit by " Provider               Social History     Tobacco Use     Smoking status: Never Smoker     Smokeless tobacco: Never Used   Substance Use Topics     Alcohol use: Yes     Alcohol/week: 0.0 standard drinks     Comment: Occasionally.         Alcohol Use 9/4/2019   Prescreen: >3 drinks/day or >7 drinks/week? No   Prescreen: >3 drinks/day or >7 drinks/week? -       Current providers sharing in care for this patient include:   Patient Care Team:  Yared Gallardo MD as PCP - General  Yared Gallardo MD as Assigned PCP    The following health maintenance items are reviewed in Epic and correct as of today:  Health Maintenance Due   Topic Date Due     MEDICARE ANNUAL WELLNESS VISIT  12/10/2021     FALL RISK ASSESSMENT  12/10/2021       Immunization History   Administered Date(s) Administered     COVID-19,PF,Pfizer (12+ Yrs) 01/23/2021, 02/13/2021, 10/08/2021     FLUAD(HD)65+ QUAD 10/08/2021     Influenza (High Dose) 3 valent vaccine 09/25/2017, 10/01/2018, 09/19/2019     Influenza (IIV3) PF 12/14/2000, 11/26/2003     Influenza, Quad, High Dose, Pf, 65yr+ (Fluzone HD) 10/12/2020     Pneumo Conj 13-V (2010&after) 08/09/2016     Pneumococcal 23 valent 09/16/2014     TDAP Vaccine (Adacel) 07/15/2014     Zoster vaccine recombinant adjuvanted (SHINGRIX) 12/18/2019, 05/27/2020       Review of Systems   Constitutional: Negative for chills and fever.   HENT: Negative for congestion, ear pain, hearing loss and sore throat.    Eyes: Negative for pain and visual disturbance.   Respiratory: Negative for cough and shortness of breath.    Cardiovascular: Negative for chest pain, palpitations and peripheral edema.   Gastrointestinal: Negative for abdominal pain, constipation, diarrhea, heartburn, hematochezia and nausea.   Genitourinary: Negative for dysuria, frequency, genital sores, hematuria, impotence, penile discharge and urgency.   Musculoskeletal: Negative for arthralgias, joint swelling and myalgias.   Skin: Negative for rash.  "  Neurological: Negative for dizziness, weakness, headaches and paresthesias.   Psychiatric/Behavioral: Negative for mood changes. The patient is not nervous/anxious.        OBJECTIVE:   /80   Pulse 78   Temp 97.8  F (36.6  C) (Temporal)   Resp 16   Ht 1.803 m (5' 11\")   Wt 86.2 kg (190 lb)   SpO2 98%   BMI 26.50 kg/m   Estimated body mass index is 27.72 kg/m  as calculated from the following:    Height as of 7/6/21: 1.778 m (5' 10\").    Weight as of 11/15/21: 87.6 kg (193 lb 3.2 oz).     Physical Exam:    GENERAL: healthy, alert and no distress  EYES: Eyes grossly normal to inspection, PERRL and conjunctivae and sclerae normal  HENT: ear canals and TM's normal, nose and mouth without ulcers or lesions  NECK: no adenopathy, no asymmetry, masses, or scars and thyroid normal to palpation  RESP: lungs clear to auscultation - no rales, rhonchi or wheezes  CV: regular rate and rhythm, normal S1 S2, no S3 or S4, no click or rub, no peripheral edema and peripheral pulses strong  ABDOMEN: soft, nontender, no hepatosplenomegaly, no masses and bowel sounds normal  Normal digital exam rectally, prostate prominent, no nodular changes  MS: no gross musculoskeletal defects noted, no edema  NEURO: Normal strength and tone, mentation intact and speech normal  PSYCH: mentation appears normal, affect normal/bright      ASSESSMENT / PLAN:   (Z00.00) Encounter for subsequent annual wellness visit in Medicare patient  (primary encounter diagnosis)  Comment: Stable and doing well and recommend an annual wellness exam.  Plan: Comprehensive metabolic panel            (I10) Essential hypertension, benign  Comment: At goal on therapy continue with current medical management, recheck blood pressure next clinic visit  Plan: Comprehensive metabolic panel, irbesartan         (AVAPRO) 150 MG tablet            (M45.8) Ankylosing spondylitis of sacral region (H)  Comment: Stable continue with as needed anti-inflammatory use.  Discussed " "the use with food and consideration of PPI combination therapy  Plan:     (Z12.5) Screening PSA (prostate specific antigen)  Comment: Ordered as routine screening based on age.  Plan: Prostate Specific Antigen Screen            (Z12.11) Colon cancer screening  Comment: Prior colonoscopy reviewed and appears up-to-date.  Suggest annual FIT testing  Plan: Fecal colorectal cancer screen FIT            (E78.5) Hyperlipidemia LDL goal <130  Comment: Labs ordered as fasting per routine  Plan: Lipid Profile            (N52.9) Erectile dysfunction, unspecified erectile dysfunction type  Comment: Refilled per patient request.  As needed use noted  Plan: tadalafil (CIALIS) 20 MG tablet          Patient has been advised of split billing requirements and indicates understanding: Yes  COUNSELING:  Reviewed preventive health counseling, as reflected in patient instructions       Regular exercise       Healthy diet/nutrition    Estimated body mass index is 27.72 kg/m  as calculated from the following:    Height as of 7/6/21: 1.778 m (5' 10\").    Weight as of 11/15/21: 87.6 kg (193 lb 3.2 oz).    He reports that he has never smoked. He has never used smokeless tobacco.    Appropriate preventive services were discussed with this patient, including applicable screening as appropriate for cardiovascular disease, diabetes, osteopenia/osteoporosis, and glaucoma.  As appropriate for age/gender, discussed screening for colorectal cancer, prostate cancer, breast cancer, and cervical cancer. Checklist reviewing preventive services available has been given to the patient.    Reviewed patients plan of care and provided an AVS. The Basic Care Plan (routine screening as documented in Health Maintenance) for Saji meets the Care Plan requirement. This Care Plan has been established and reviewed with the Patient.    Counseling Resources:  ATP IV Guidelines  Pooled Cohorts Equation Calculator  Breast Cancer Risk Calculator  Breast Cancer: " Medication to Reduce Risk  FRAX Risk Assessment  ICSI Preventive Guidelines  Dietary Guidelines for Americans, 2010  USDA's MyPlate  ASA Prophylaxis  Lung CA Screening    Yared Gallardo MD  [facility]    Identified Health Risks:

## 2021-12-13 ASSESSMENT — ENCOUNTER SYMPTOMS
EYE PAIN: 0
HEMATURIA: 0
WEAKNESS: 0
DIARRHEA: 0
FEVER: 0
CHILLS: 0
NERVOUS/ANXIOUS: 0
PALPITATIONS: 0
HEARTBURN: 0
COUGH: 0
CONSTIPATION: 0
HEADACHES: 0
NAUSEA: 0
DIZZINESS: 0
ARTHRALGIAS: 0
JOINT SWELLING: 0
SHORTNESS OF BREATH: 0
MYALGIAS: 0
SORE THROAT: 0
DYSURIA: 0
PARESTHESIAS: 0
FREQUENCY: 0
HEMATOCHEZIA: 0
ABDOMINAL PAIN: 0

## 2021-12-13 ASSESSMENT — ACTIVITIES OF DAILY LIVING (ADL): CURRENT_FUNCTION: NO ASSISTANCE NEEDED

## 2021-12-14 ENCOUNTER — OFFICE VISIT (OUTPATIENT)
Dept: INTERNAL MEDICINE | Facility: CLINIC | Age: 71
End: 2021-12-14
Payer: COMMERCIAL

## 2021-12-14 VITALS
OXYGEN SATURATION: 98 % | HEART RATE: 78 BPM | RESPIRATION RATE: 16 BRPM | DIASTOLIC BLOOD PRESSURE: 80 MMHG | BODY MASS INDEX: 26.6 KG/M2 | TEMPERATURE: 97.8 F | HEIGHT: 71 IN | SYSTOLIC BLOOD PRESSURE: 138 MMHG | WEIGHT: 190 LBS

## 2021-12-14 DIAGNOSIS — M45.8 ANKYLOSING SPONDYLITIS OF SACRAL REGION (H): ICD-10-CM

## 2021-12-14 DIAGNOSIS — Z00.00 ENCOUNTER FOR SUBSEQUENT ANNUAL WELLNESS VISIT IN MEDICARE PATIENT: Primary | ICD-10-CM

## 2021-12-14 DIAGNOSIS — I10 ESSENTIAL HYPERTENSION, BENIGN: ICD-10-CM

## 2021-12-14 DIAGNOSIS — Z12.5 SCREENING PSA (PROSTATE SPECIFIC ANTIGEN): ICD-10-CM

## 2021-12-14 DIAGNOSIS — Z12.11 COLON CANCER SCREENING: ICD-10-CM

## 2021-12-14 DIAGNOSIS — E78.5 HYPERLIPIDEMIA LDL GOAL <130: ICD-10-CM

## 2021-12-14 DIAGNOSIS — N52.9 ERECTILE DYSFUNCTION, UNSPECIFIED ERECTILE DYSFUNCTION TYPE: ICD-10-CM

## 2021-12-14 LAB
ALBUMIN SERPL-MCNC: 3.5 G/DL (ref 3.4–5)
ALP SERPL-CCNC: 80 U/L (ref 40–150)
ALT SERPL W P-5'-P-CCNC: 20 U/L (ref 0–70)
ANION GAP SERPL CALCULATED.3IONS-SCNC: 4 MMOL/L (ref 3–14)
AST SERPL W P-5'-P-CCNC: 17 U/L (ref 0–45)
BILIRUB SERPL-MCNC: 0.7 MG/DL (ref 0.2–1.3)
BUN SERPL-MCNC: 15 MG/DL (ref 7–30)
CALCIUM SERPL-MCNC: 8.8 MG/DL (ref 8.5–10.1)
CHLORIDE BLD-SCNC: 107 MMOL/L (ref 94–109)
CO2 SERPL-SCNC: 28 MMOL/L (ref 20–32)
CREAT SERPL-MCNC: 0.87 MG/DL (ref 0.66–1.25)
GFR SERPL CREATININE-BSD FRML MDRD: 86 ML/MIN/1.73M2
GLUCOSE BLD-MCNC: 96 MG/DL (ref 70–99)
POTASSIUM BLD-SCNC: 4 MMOL/L (ref 3.4–5.3)
PROT SERPL-MCNC: 7.6 G/DL (ref 6.8–8.8)
PSA SERPL-MCNC: 6.97 UG/L (ref 0–4)
SODIUM SERPL-SCNC: 139 MMOL/L (ref 133–144)

## 2021-12-14 PROCEDURE — 80053 COMPREHEN METABOLIC PANEL: CPT | Performed by: INTERNAL MEDICINE

## 2021-12-14 PROCEDURE — 36415 COLL VENOUS BLD VENIPUNCTURE: CPT | Performed by: INTERNAL MEDICINE

## 2021-12-14 PROCEDURE — 99397 PER PM REEVAL EST PAT 65+ YR: CPT | Performed by: INTERNAL MEDICINE

## 2021-12-14 PROCEDURE — 80061 LIPID PANEL: CPT | Performed by: INTERNAL MEDICINE

## 2021-12-14 PROCEDURE — G0103 PSA SCREENING: HCPCS | Performed by: INTERNAL MEDICINE

## 2021-12-14 RX ORDER — DICLOFENAC SODIUM 75 MG/1
75 TABLET, DELAYED RELEASE ORAL
COMMUNITY
Start: 2021-11-01 | End: 2022-08-26

## 2021-12-14 RX ORDER — IRBESARTAN 150 MG/1
TABLET ORAL
Qty: 90 TABLET | Refills: 3 | Status: SHIPPED | OUTPATIENT
Start: 2021-12-14 | End: 2023-03-14

## 2021-12-14 RX ORDER — TADALAFIL 20 MG/1
TABLET ORAL
Qty: 30 TABLET | Refills: 5 | Status: SHIPPED | OUTPATIENT
Start: 2021-12-14 | End: 2022-10-25

## 2021-12-14 ASSESSMENT — ENCOUNTER SYMPTOMS
CONSTIPATION: 0
DIZZINESS: 0
PALPITATIONS: 0
FREQUENCY: 0
HEADACHES: 0
WEAKNESS: 0
HEARTBURN: 0
MYALGIAS: 0
SORE THROAT: 0
PARESTHESIAS: 0
CHILLS: 0
DIARRHEA: 0
ARTHRALGIAS: 0
DYSURIA: 0
JOINT SWELLING: 0
SHORTNESS OF BREATH: 0
EYE PAIN: 0
NAUSEA: 0
NERVOUS/ANXIOUS: 0
FEVER: 0
HEMATURIA: 0
HEMATOCHEZIA: 0
ABDOMINAL PAIN: 0
COUGH: 0

## 2021-12-14 ASSESSMENT — ACTIVITIES OF DAILY LIVING (ADL): CURRENT_FUNCTION: NO ASSISTANCE NEEDED

## 2021-12-14 ASSESSMENT — MIFFLIN-ST. JEOR: SCORE: 1633.96

## 2021-12-15 ENCOUNTER — HOSPITAL ENCOUNTER (OUTPATIENT)
Dept: MAMMOGRAPHY | Facility: CLINIC | Age: 71
End: 2021-12-15
Attending: PHYSICIAN ASSISTANT
Payer: COMMERCIAL

## 2021-12-15 DIAGNOSIS — N63.20 LEFT BREAST LUMP: ICD-10-CM

## 2021-12-15 DIAGNOSIS — N64.4 BREAST PAIN, LEFT: ICD-10-CM

## 2021-12-15 DIAGNOSIS — N63.22 MASS OF UPPER INNER QUADRANT OF LEFT BREAST: ICD-10-CM

## 2021-12-15 PROCEDURE — 88305 TISSUE EXAM BY PATHOLOGIST: CPT | Mod: TC | Performed by: PHYSICIAN ASSISTANT

## 2021-12-15 PROCEDURE — 999N000065 MA POST PROCEDURE LEFT

## 2021-12-15 PROCEDURE — 250N000009 HC RX 250: Performed by: PHYSICIAN ASSISTANT

## 2021-12-15 PROCEDURE — 272N000031 US BREAST BIOPSY CORE NEEDLE LEFT

## 2021-12-15 RX ADMIN — LIDOCAINE HYDROCHLORIDE 5 ML: 10 INJECTION, SOLUTION INFILTRATION; PERINEURAL at 10:23

## 2021-12-15 NOTE — DISCHARGE INSTRUCTIONS
After Your Breast Biopsy    Bleeding or bruising: Slight bruising is normal.  If you bleed through the bandage, put direct pressure on the breast.  If you are still bleeding after 20 minutes, call the doctor who ordered the exam.    Bandages: Keep your bandage in place until tomorrow morning.  Do not get it wet.  Replace the bandaid on the second day..    Activity: You may shower the morning after the exam.  No heavy activity (lifting, vacuuming) for 24 hours.    Discomfort: Wear your bra overnight to support the breast.  You may take Tylenol (acetaminophen) for pain.  If you had a stereotactic of MR-directed biopsy, you may take aspirin or ibuprofen (Advil, Motrin) the morning after your biopsy, unless your doctor tells you not to.    Infection: Infection is rare.  Symptoms include fever, redness, increasing pain and fluid draining from the biopsy site.  If you have any of these symptoms, please call the doctor who ordered your exam.    Results: Results may take up to three business days.  If you have not heard your results in three days, call the Breast Center Nurse at [phone] or [phone].  In rare cases, we may need to do another biopsy.    Call the doctor who ordered your exam if:    You have bleeding that lasts more than 20 minutes.    You have pain that cannot be controlled.    You have signs of infection (fever, redness, drainage or other signs).    You have not had your results within three days.    Nurse navigator: Our nurse navigator is here to answer your questions and help you set up future clinic visits.  Please call [phone].    Thank you for choosing [facility].  Please call us if you have questions or concerns about your biopsy.

## 2021-12-16 LAB
PATH REPORT.COMMENTS IMP SPEC: NORMAL
PATH REPORT.FINAL DX SPEC: NORMAL
PATH REPORT.GROSS SPEC: NORMAL
PATH REPORT.MICROSCOPIC SPEC OTHER STN: NORMAL
PATH REPORT.RELEVANT HX SPEC: NORMAL
PHOTO IMAGE: NORMAL

## 2021-12-16 PROCEDURE — 88305 TISSUE EXAM BY PATHOLOGIST: CPT | Mod: 26 | Performed by: PATHOLOGY

## 2021-12-16 NOTE — PROGRESS NOTES
After review by [facility] Radiologist, Dr. Haseeb Talavera, Saji Morrison was called and  given his Left Breast Biopsy Pathology results:    Final Diagnosis  Breast, left, 11:00 2.2 cm from nipple, ultrasound-guided needle core biopsy:  - Hemangioma; no evidence of malignancy in the planes examined.    and Follow up Recommendations (Clinical Follow up).  Saji denies any post biopsy site issues. I encouraged him to notify her doctor if any breast changes or concerns arise.    Bibi Cano BSN, RN  Procedure Nurse  [facility]  [phone]

## 2021-12-17 LAB
CHOLEST SERPL-MCNC: 236 MG/DL
FASTING STATUS PATIENT QL REPORTED: YES
HDLC SERPL-MCNC: 72 MG/DL
LDLC SERPL CALC-MCNC: 135 MG/DL
NONHDLC SERPL-MCNC: 164 MG/DL
TRIGL SERPL-MCNC: 143 MG/DL

## 2021-12-17 PROCEDURE — 82274 ASSAY TEST FOR BLOOD FECAL: CPT | Performed by: INTERNAL MEDICINE

## 2021-12-20 LAB — HEMOCCULT STL QL IA: NEGATIVE

## 2022-03-07 ENCOUNTER — OFFICE VISIT (OUTPATIENT)
Dept: INTERNAL MEDICINE | Facility: CLINIC | Age: 72
End: 2022-03-07
Payer: COMMERCIAL

## 2022-03-07 VITALS
WEIGHT: 196.4 LBS | SYSTOLIC BLOOD PRESSURE: 132 MMHG | TEMPERATURE: 97.9 F | RESPIRATION RATE: 15 BRPM | OXYGEN SATURATION: 98 % | BODY MASS INDEX: 27.5 KG/M2 | HEIGHT: 71 IN | HEART RATE: 72 BPM | DIASTOLIC BLOOD PRESSURE: 80 MMHG

## 2022-03-07 DIAGNOSIS — M45.8 ANKYLOSING SPONDYLITIS OF SACRAL REGION (H): ICD-10-CM

## 2022-03-07 DIAGNOSIS — Z01.818 PRE-OPERATIVE GENERAL PHYSICAL EXAMINATION: Primary | ICD-10-CM

## 2022-03-07 DIAGNOSIS — M25.559 HIP PAIN: ICD-10-CM

## 2022-03-07 DIAGNOSIS — I10 ESSENTIAL HYPERTENSION, BENIGN: ICD-10-CM

## 2022-03-07 LAB
HGB BLD-MCNC: 13.6 G/DL (ref 13.3–17.7)
PLATELET # BLD AUTO: 150 10E3/UL (ref 150–450)
POTASSIUM BLD-SCNC: 3.9 MMOL/L (ref 3.4–5.3)

## 2022-03-07 PROCEDURE — 85049 AUTOMATED PLATELET COUNT: CPT | Performed by: INTERNAL MEDICINE

## 2022-03-07 PROCEDURE — 93000 ELECTROCARDIOGRAM COMPLETE: CPT | Performed by: INTERNAL MEDICINE

## 2022-03-07 PROCEDURE — 85018 HEMOGLOBIN: CPT | Performed by: INTERNAL MEDICINE

## 2022-03-07 PROCEDURE — 84132 ASSAY OF SERUM POTASSIUM: CPT | Performed by: INTERNAL MEDICINE

## 2022-03-07 PROCEDURE — 99214 OFFICE O/P EST MOD 30 MIN: CPT | Performed by: INTERNAL MEDICINE

## 2022-03-07 PROCEDURE — 36415 COLL VENOUS BLD VENIPUNCTURE: CPT | Performed by: INTERNAL MEDICINE
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
## 2022-03-16 ENCOUNTER — MYC MEDICAL ADVICE (OUTPATIENT)
Dept: INTERNAL MEDICINE | Facility: CLINIC | Age: 72
End: 2022-03-16
Payer: COMMERCIAL

## 2022-03-16 NOTE — TELEPHONE ENCOUNTER
Please see mychart from patient and advise on appropriate course of action.     Cleo Lemus RN    [facility] Triage Nurse

## 2022-08-22 ENCOUNTER — APPOINTMENT (OUTPATIENT)
Dept: URBAN - METROPOLITAN AREA CLINIC 256 | Age: 72
Setting detail: DERMATOLOGY
End: 2022-08-23

## 2022-08-22 VITALS — HEIGHT: 70 IN | WEIGHT: 187 LBS

## 2022-08-22 DIAGNOSIS — L82.1 OTHER SEBORRHEIC KERATOSIS: ICD-10-CM

## 2022-08-22 DIAGNOSIS — D18.0 HEMANGIOMA: ICD-10-CM

## 2022-08-22 DIAGNOSIS — L81.4 OTHER MELANIN HYPERPIGMENTATION: ICD-10-CM

## 2022-08-22 DIAGNOSIS — L57.0 ACTINIC KERATOSIS: ICD-10-CM

## 2022-08-22 DIAGNOSIS — L57.8 OTHER SKIN CHANGES DUE TO CHRONIC EXPOSURE TO NONIONIZING RADIATION: ICD-10-CM

## 2022-08-22 DIAGNOSIS — D22 MELANOCYTIC NEVI: ICD-10-CM

## 2022-08-22 DIAGNOSIS — Z71.89 OTHER SPECIFIED COUNSELING: ICD-10-CM

## 2022-08-22 DIAGNOSIS — D485 NEOPLASM OF UNCERTAIN BEHAVIOR OF SKIN: ICD-10-CM

## 2022-08-22 PROBLEM — D18.01 HEMANGIOMA OF SKIN AND SUBCUTANEOUS TISSUE: Status: ACTIVE | Noted: 2022-08-22

## 2022-08-22 PROBLEM — D22.5 MELANOCYTIC NEVI OF TRUNK: Status: ACTIVE | Noted: 2022-08-22

## 2022-08-22 PROBLEM — D48.5 NEOPLASM OF UNCERTAIN BEHAVIOR OF SKIN: Status: ACTIVE | Noted: 2022-08-22

## 2022-08-22 PROCEDURE — 99213 OFFICE O/P EST LOW 20 MIN: CPT | Mod: 25

## 2022-08-22 PROCEDURE — 11102 TANGNTL BX SKIN SINGLE LES: CPT | Mod: 59

## 2022-08-22 PROCEDURE — OTHER MIPS QUALITY: OTHER

## 2022-08-22 PROCEDURE — OTHER COUNSELING: OTHER

## 2022-08-22 PROCEDURE — OTHER BIOPSY BY SHAVE METHOD: OTHER

## 2022-08-22 PROCEDURE — OTHER LIQUID NITROGEN: OTHER

## 2022-08-22 PROCEDURE — OTHER PRESCRIPTION: OTHER

## 2022-08-22 PROCEDURE — 17004 DESTROY PREMAL LESIONS 15/>: CPT

## 2022-08-22 RX ORDER — FLUOROURACIL 2 G/40G
CREAM TOPICAL BID
Qty: 40 | Refills: 1 | Status: ERX | COMMUNITY
Start: 2022-08-22

## 2022-08-22 ASSESSMENT — LOCATION SIMPLE DESCRIPTION DERM
LOCATION SIMPLE: RIGHT FOREHEAD
LOCATION SIMPLE: SCALP
LOCATION SIMPLE: LEFT ANTERIOR NECK
LOCATION SIMPLE: LEFT FOREHEAD
LOCATION SIMPLE: CHEST
LOCATION SIMPLE: LEFT CHEEK
LOCATION SIMPLE: LEFT UPPER BACK
LOCATION SIMPLE: LEFT TEMPLE
LOCATION SIMPLE: RIGHT UPPER BACK
LOCATION SIMPLE: UPPER BACK
LOCATION SIMPLE: RIGHT CHEEK
LOCATION SIMPLE: LEFT ZYGOMA
LOCATION SIMPLE: RIGHT TEMPLE
LOCATION SIMPLE: RIGHT OCCIPITAL SCALP

## 2022-08-22 ASSESSMENT — LOCATION DETAILED DESCRIPTION DERM
LOCATION DETAILED: INFERIOR THORACIC SPINE
LOCATION DETAILED: RIGHT SUPERIOR PARIETAL SCALP
LOCATION DETAILED: RIGHT CENTRAL TEMPLE
LOCATION DETAILED: LEFT CENTRAL ZYGOMA
LOCATION DETAILED: RIGHT INFERIOR FOREHEAD
LOCATION DETAILED: LEFT MEDIAL UPPER BACK
LOCATION DETAILED: LEFT SUPERIOR MEDIAL FOREHEAD
LOCATION DETAILED: LEFT CENTRAL TEMPLE
LOCATION DETAILED: LEFT SUPERIOR FOREHEAD
LOCATION DETAILED: RIGHT SUPERIOR LATERAL MALAR CHEEK
LOCATION DETAILED: RIGHT SUPERIOR OCCIPITAL SCALP
LOCATION DETAILED: STERNUM
LOCATION DETAILED: RIGHT LATERAL INFERIOR CHEST
LOCATION DETAILED: LEFT SUPERIOR CENTRAL MALAR CHEEK
LOCATION DETAILED: LEFT INFERIOR MEDIAL UPPER BACK
LOCATION DETAILED: LEFT CLAVICULAR NECK
LOCATION DETAILED: RIGHT SUPERIOR MEDIAL UPPER BACK
LOCATION DETAILED: LEFT INFERIOR MEDIAL MALAR CHEEK

## 2022-08-22 ASSESSMENT — LOCATION ZONE DERM
LOCATION ZONE: NECK
LOCATION ZONE: FACE
LOCATION ZONE: TRUNK
LOCATION ZONE: SCALP

## 2022-08-22 NOTE — PROCEDURE: MIPS QUALITY
Quality 226: Preventive Care And Screening: Tobacco Use: Screening And Cessation Intervention: Patient screened for tobacco use and is an ex/non-smoker
Detail Level: Generalized
Quality 130: Documentation Of Current Medications In The Medical Record: Current Medications Documented
Quality 110: Preventive Care And Screening: Influenza Immunization: Influenza Immunization Ordered or Recommended, but not Administered due to system reason
Quality 431: Preventive Care And Screening: Unhealthy Alcohol Use - Screening: Patient not identified as an unhealthy alcohol user when screened for unhealthy alcohol use using a systematic screening method

## 2022-08-22 NOTE — PROCEDURE: BIOPSY BY SHAVE METHOD
Cryotherapy Text: The wound bed was treated with cryotherapy after the biopsy was performed.
Render Post-Care Instructions In Note?: no
Information: Selecting Yes will display possible errors in your note based on the variables you have selected. This validation is only offered as a suggestion for you. PLEASE NOTE THAT THE VALIDATION TEXT WILL BE REMOVED WHEN YOU FINALIZE YOUR NOTE. IF YOU WANT TO FAX A PRELIMINARY NOTE YOU WILL NEED TO TOGGLE THIS TO 'NO' IF YOU DO NOT WANT IT IN YOUR FAXED NOTE.
Biopsy Type: H and E
Anesthesia Volume In Cc (Will Not Render If 0): 0.3
Additional Anesthesia Volume In Cc (Will Not Render If 0): 0
Hemostasis: Drysol
Depth Of Biopsy: dermis
Billing Type: Third-Party Bill
Anesthesia Type: 2% lidocaine with epinephrine
Electrodesiccation And Curettage Text: The wound bed was treated with electrodesiccation and curettage after the biopsy was performed.
Dressing: bandage
Detail Level: Detailed
Post-Care Instructions: I reviewed with the patient in detail post-care instructions. Patient is to keep the biopsy site dry overnight, and then apply bacitracin twice daily until healed. Patient may apply hydrogen peroxide soaks to remove any crusting.
Was A Bandage Applied: Yes
Type Of Destruction Used: Curettage
Wound Care: Petrolatum
Curettage Text: The wound bed was treated with curettage after the biopsy was performed.
Path Notes Override (Will Replace All Of The Above Text): Please confirm margins
Notification Instructions: Patient will be notified of biopsy results. However, patient instructed to call the office if not contacted within 2 weeks.
Silver Nitrate Text: The wound bed was treated with silver nitrate after the biopsy was performed.
Consent: Written consent was obtained and risks were reviewed including but not limited to scarring, infection, bleeding, scabbing, incomplete removal, nerve damage and allergy to anesthesia.
Biopsy Method: double edge Personna blade
Electrodesiccation Text: The wound bed was treated with electrodesiccation after the biopsy was performed.

## 2022-08-22 NOTE — PROCEDURE: COUNSELING
Patient Specific Counseling (Will Not Stick From Patient To Patient): \\n*** Due to diffuse actinic damage on the chest, I am going to have him use Efudex in October.  Then follow up 6-8 weeks later [place]
Detail Level: Generalized
Detail Level: Detailed
Detail Level: Zone

## 2022-08-22 NOTE — PROCEDURE: LIQUID NITROGEN
Consent: The patient's consent was obtained including but not limited to risks of crusting, scabbing, blistering, scarring, darker or lighter pigmentary change, recurrence, incomplete removal and infection.
Duration Of Freeze Thaw-Cycle (Seconds): 10
Render Post-Care Instructions In Note?: no
Show Aperture Variable?: Yes
Post-Care Instructions: I reviewed with the patient in detail post-care instructions. Patient is to wear sunprotection, and avoid picking at any of the treated lesions. Pt may apply Vaseline to crusted or scabbing areas.
Number Of Freeze-Thaw Cycles: 3 freeze-thaw cycles
Detail Level: Detailed

## 2022-08-24 ENCOUNTER — MYC MEDICAL ADVICE (OUTPATIENT)
Dept: INTERNAL MEDICINE | Facility: CLINIC | Age: 72
End: 2022-08-24

## 2022-08-26 ENCOUNTER — OFFICE VISIT (OUTPATIENT)
Dept: FAMILY MEDICINE | Facility: CLINIC | Age: 72
End: 2022-08-26
Payer: COMMERCIAL

## 2022-08-26 ENCOUNTER — NURSE TRIAGE (OUTPATIENT)
Dept: INTERNAL MEDICINE | Facility: CLINIC | Age: 72
End: 2022-08-26

## 2022-08-26 VITALS
OXYGEN SATURATION: 98 % | HEART RATE: 76 BPM | SYSTOLIC BLOOD PRESSURE: 142 MMHG | DIASTOLIC BLOOD PRESSURE: 70 MMHG | TEMPERATURE: 97.8 F

## 2022-08-26 DIAGNOSIS — H61.21 IMPACTED CERUMEN OF RIGHT EAR: Primary | ICD-10-CM

## 2022-08-26 PROCEDURE — 69209 REMOVE IMPACTED EAR WAX UNI: CPT | Mod: RT | Performed by: PHYSICIAN ASSISTANT

## 2022-08-26 PROCEDURE — 99212 OFFICE O/P EST SF 10 MIN: CPT | Mod: 25 | Performed by: PHYSICIAN ASSISTANT

## 2022-08-26 RX ORDER — ACETAMINOPHEN 500 MG
500 TABLET ORAL
COMMUNITY
Start: 2022-03-21 | End: 2023-03-14

## 2022-08-26 ASSESSMENT — ENCOUNTER SYMPTOMS
DIZZINESS: 0
RHINORRHEA: 0
LIGHT-HEADEDNESS: 0
FEVER: 0

## 2022-08-26 NOTE — PROGRESS NOTES
Assessment & Plan:        ICD-10-CM    1. Impacted cerumen of right ear  H61.21 MI REMOVAL IMPACTED CERUMEN IRRIGATION/LVG UNILAT         Plan/Clinical Decision Making:    Patient with excess ear wax in right ear, mild amount in left ear.   MA did lavage for both ears.   TMs normal, canal normal after irrigation.   Patient stated felt better.     I discussed crackling possibly due to ETD.   Recommend steroid nasal spray if he notices crackling not resolved with lavage.       Return if symptoms worsen or fail to improve 3-5 days.     At the end of the encounter, I discussed results, diagnosis, medications. Discussed red flags for immediate return to clinic/ER, as well as indications for follow up if no improvement. Patient understood and agreed to plan. Patient was stable for discharge.        Laquita Dubon PA-C on 8/26/2022 at 11:25 AM          Subjective:     HPI:    Saji is a 72 year old male who presents to clinic today for the following health issues:  Chief Complaint   Patient presents with     Urgent Care     Ear Problem     Bilateral ear crackling in the last 3 days. Possible ear wax build     HPI    Patient complains of crackling in ears x 3 days.   No hx of seasonal allergies.   No recent flights, change in pressure.   No injury or trauma to ears or head.       History obtained from the patient.    Review of Systems   Constitutional: Negative for fever.   HENT: Negative for congestion, ear discharge, ear pain, hearing loss and rhinorrhea.    Neurological: Negative for dizziness and light-headedness.         Patient Active Problem List   Diagnosis     Essential hypertension, benign     Impotence of organic origin     HYPERLIPIDEMIA LDL GOAL <130     ACP (advance care planning)     Ankylosing spondylitis of sacral region (H)        Past Medical History:   Diagnosis Date     Ankylosing spondylitis of sacral region (H)      BENIGN HYPERTENSION 9/29/2003     Diaphragmatic hernia without mention of  obstruction or gangrene 1989     Hyperlipidemia LDL goal <130 10/31/2010     Impotence of organic origin      IMPOTENCE, ORGANIC ORIGN 11/26/2003     Infectious mononucleosis 1960       Social History     Tobacco Use     Smoking status: Never Smoker     Smokeless tobacco: Never Used   Substance Use Topics     Alcohol use: Yes     Alcohol/week: 0.0 standard drinks     Comment: Occasionally.             Objective:     Vitals:    08/26/22 1110   BP: (!) 142/70   Pulse: 76   Temp: 97.8  F (36.6  C)   TempSrc: Tympanic   SpO2: 98%         Physical Exam   EXAM:   Pleasant, alert, appropriate appearance. NAD.  Head Exam: Normocephalic, atraumatic.  Eye Exam:  non icteric/injection.    Ear Exam:  Right TM blocked with wax, Left TM grey without bulging. Minimal wax. Normal canals.  Normal pinna.  Neuro: CN II-XII intact grossly intact.  Skin: no rash or lesion.      Results:  No results found for any visits on 08/26/22.

## 2022-08-26 NOTE — TELEPHONE ENCOUNTER
Nurse Triage SBAR    Is this a 2nd Level Triage? NO    Situation: Patient reports crackling sound in both ears that started 3-4 days ago. Denies pain or other illness.    Background: no history of ear wax problem.    Assessment: Possible ear wax build up.    Protocol Recommended Disposition:   See in Office Within 3 Days, See More Appropriate Protocol  Patient did not want to try home measures.     Recommendation: not routed     not routed    Does the patient meet one of the following criteria for ADS visit consideration? 16+ years old, with an [facility] PCP     TIP  Providers, please consider if this condition is appropriate for management at one of our Acute and Diagnostic Services sites.     If patient is a good candidate, please use dotphrase <dot>triageresponse and select Refer to ADS to document.      Reason for Disposition    Earwax, questions about    Patient wants to be seen    Additional Information    Negative: Followed an ear injury    Negative: Decreased hearing with nasal allergies    Negative: Part of a cold    Negative: Follows air travel or mountain driving    Negative: Ear injury is main concern    Negative: Injury to ear canal from cotton swab (e.g., Q-tip) or doctor's ear exam    Negative: Foreign body stuck in the ear (e.g., bug, piece of cotton)    Negative: Sudden onset of hearing loss    Negative: Dizziness is main symptom    Negative: Pain or discomfort in or around ear is main symptom    Negative: Patient sounds very sick or weak to the triager    Negative: Sudden onset of ear pain or bleeding after long, thin object was inserted into the ear canal (e.g., pencil, Q-tip)    Negative: Ear pain after ear syringing (i.e., squirting liquid into ear canal to remove wax) and severe    Negative: Ear pain after ear syringing (i.e., squirting liquid into ear canal to remove wax) and lasts > 1 hour    Negative: Walking is very unsteady or feels very dizzy  (Exception: Brief dizziness that occurs with ear  "syringing.)    Negative: History of ear drum perforation, tubes or ear surgery    Negative: Complete hearing loss in either ear    Negative: Diabetes mellitus    Answer Assessment - Initial Assessment Questions  1. DESCRIPTION: \"What type of hearing problem are you having? Describe it for me.\" (e.g., complete hearing loss, partial loss)      Crackling noise in both ears   2. LOCATION: \"One or both ears?\" If one, ask: \"Which ear?\"      Both ears  3. SEVERITY: \"Can you hear anything?\" If Yes, ask: \"What can you hear?\" (e.g., ticking watch, whisper, talking)    - MILD:  Difficulty hearing soft speech, quiet library sounds, or speech from a distance or over background noise.    - MODERATE: Difficulty hearing normal speech even at closed distances.    - SEVERE: Unable to hear most normal conversation and talking; only able to hear loud sounds such as an alarm clock.      No hearing loss  4. ONSET: \"When did this begin?\" \"Did it start suddenly or come on gradually?\"      3-4 days  5. PATTERN: \"Does this come and go, or has it been constant since it started?\"      Constant since started  6. PAIN: \"Is there any pain in your ear(s)?\"  (Scale 1-10; or mild, moderate, severe)    - NONE (0): no pain    - MILD (1-3): doesn't interfere with normal activities     - MODERATE (4-7): interferes with normal activities or awakens from sleep     - SEVERE (8-10): excruciating pain, unable to do any normal activities       0  7. CAUSE: \"What do you think is causing this hearing problem?\"      Ear wax  8. OTHER SYMPTOMS: \"Do you have any other symptoms?\" (e.g., dizziness, ringing in ears)      none  9. PREGNANCY: \"Is there any chance you are pregnant?\" \"When was your last menstrual period?\"      NA    Answer Assessment - Initial Assessment Questions  1. LOCATION: \"Which ear is involved?\"       boht ears  2. SYMPTOMS: \"What are the main symptoms?\" (e.g., fullness, decreased hearing, itching, discomfort)      Crackling sound  3. ONSET: \"When " "did the  Crackling sound  start?\"      3-4 days ago  4. PAIN: \"Is there any earache?\" \"How bad is it?\"  (Scale 1-10; or mild, moderate, severe)      No pain  5. OBJECTS: \"Do you use cotton swabs (Q-tips) in your ear?\" \"Have you put anything else in your ear?\"      no  6. EARWAX HISTORY: \"Have you had problems with earwax before?\" If Yes, ask: \"What did you do the last time?\"      Never had a problem with ear wax in the past. Did not want to try home measures.    Protocols used: HEARING LOSS OR CHANGE-A-OH, EARWAX-A-OH  Paris Schwartz RN      " no

## 2022-09-29 ENCOUNTER — APPOINTMENT (OUTPATIENT)
Dept: URBAN - METROPOLITAN AREA CLINIC 256 | Age: 72
Setting detail: DERMATOLOGY
End: 2022-09-30

## 2022-09-29 VITALS — HEIGHT: 70 IN | WEIGHT: 182 LBS

## 2022-09-29 PROBLEM — C44.41 BASAL CELL CARCINOMA OF SKIN OF SCALP AND NECK: Status: ACTIVE | Noted: 2022-09-29

## 2022-09-29 PROCEDURE — 17272 DSTR MAL LES S/N/H/F/G 1.1-2: CPT

## 2022-09-29 PROCEDURE — OTHER CURETTAGE AND DESTRUCTION: OTHER

## 2022-09-29 PROCEDURE — OTHER COUNSELING: OTHER

## 2022-09-29 PROCEDURE — OTHER MIPS QUALITY: OTHER

## 2022-09-29 NOTE — PROCEDURE: CURETTAGE AND DESTRUCTION
Add Intralesional Injection: No
Size Of Lesion After Curettage: 2
Concentration (Mg/Ml Or Millions Of Plaque Forming Units/Cc): 0.01
Additional Information: (Optional): - Petrolatum ointment followed by a pressure dressing was applied. \\n- Patient was instructed to keep pressure bandage on for 24-48 hours and instructed to contact [facility] any fevers, chills, bleeding, or severe pain develop. \\n- The patient received detailed post-op wound care instructions.
Bill As A Line Item Or As Units: Line Item
Consent: - Verbal and written informed consent was obtained from the patient prior to the procedure today. \\n- The risks, benefits and alternatives to therapy were discussed in detail. \\n- Specifically, the risks of infection, scarring, bleeding, prolonged wound healing, nerve injury, incomplete removal, allergy to anesthesia and recurrence were addressed.  \\n- Prior to the procedure, the treatment site was clearly identified and confirmed by the patient. All components of Universal Protocol/PAUSE Rule completed.
Number Of Curettages: 3
Detail Level: Detailed
Render Post-Care Instructions In Note?: yes
What Was Performed First?: Curettage
Total Volume (Ccs): 1
Anesthesia Type: 2% lidocaine with epinephrine
Cautery Type: electrodesiccation
Post-Care Instructions: - Keep pressure bandage on for 24-48 hours. Should you develop any fevers, chills, bleeding, severe pain, contact [facility] immediately.\\n\\nWOUND CARE:\\nDo NOT submerge wound in a bath, swimming pool, or hot tub for at least 1 week or for as long as there is an open wound. Gently cleanse the site daily with mild soap and water. Be careful NOT to allow a forceful stream of water to hit the treatment site. After cleaning/showering, apply a thin layer of petrolatum ointment or Aquaphor in the wound followed by an adhesive bandage. Continue this process daily until healed. \\n\\nBLEEDING:\\nIf you develop persistent bleeding, apply firm and steady pressure over the dressing with gauze for 15 minutes. If bleeding persists, reapply pressure with an ice pack over the gauze for 15 minutes. NEVER APPLY ICE DIRECTLY TO THE SKIN. Do NOT peek under the gauze during these 15 minutes of pressure.  Call our office at [phone] if you cannot get the bleeding to stop. \\n\\nINFECTION:\\nSigns of infection may include increasing rather than decreasing pain (after the first few days), increasing redness/swelling/heat, draining pus, pink/red streaks around the wound, and fever or chills.  Please call our office immediately at [phone] if infection is suspected. It is normal to have some mild redness on or around the wound edges; this will lighten day by day and will become less tender.\\n\\nPAIN:\\nPain is usually minimal, but if needed you may take acetaminophen (Tylenol) every four hours as needed. Applying an ice pack over the dressing for 15-20 minutes every 2-3 hours will relieve swelling, lessen pain, and help minimize bruising. NEVER APPLY ICE DIRECTLY TO THE SKIN. Avoid ibuprofen (Advil, Motrin) and naproxen (Aleve) for the first 48 hours as these can increase bleeding.\\n\\nSWELLING AND BRUISING:\\nSwelling and bruising are common and temporary, usually lasting 1 - 2 weeks. It is more common in areas treated around the eyes, hands, and feet. In the days following the procedure, swelling and bruising can be minimized by keeping the affected areas elevated when possible, reducing salty foods, and applying ice packs over the dressing for 15-20 minutes every 2-3 hours. NEVER APPLY ICE DIRECTLY TO THE SKIN.\\n\\nITCHING:\\nItchiness on and around the wound is very common and can last several days to weeks after surgery. Mild itch is normal as the wound is healing. \\n\\nNERVE CHANGES:\\nNumbness is usually temporary, but it may last for several weeks to months. You may also experience sharp pains at the wound sight as it heals.  Mild pain is normal and will gradually improve with time.\\n \\nNO SMOKING:\\nSmoking will delay the healing process. If you smoke, we recommend trying to quit or at minimum reduce how much you smoke following a procedure.

## 2022-09-29 NOTE — PROCEDURE: COUNSELING
Patient Specific Counseling (Will Not Stick From Patient To Patient): - Reviewed the pathology report with patient and discussed the recommendation for electrodessication and curettage.\\n- I did a wider ED&C due to the two lesions next to each other.
Detail Level: Detailed

## 2022-10-22 DIAGNOSIS — N52.9 ERECTILE DYSFUNCTION, UNSPECIFIED ERECTILE DYSFUNCTION TYPE: ICD-10-CM

## 2022-10-25 RX ORDER — TADALAFIL 20 MG/1
TABLET ORAL
Qty: 30 TABLET | Refills: 0 | Status: SHIPPED | OUTPATIENT
Start: 2022-10-25 | End: 2022-12-05

## 2022-10-25 NOTE — TELEPHONE ENCOUNTER
Prescription approved per [facility] Refill Protocol.  Justin Luo RN  [facility] Triage Nurse

## 2023-03-08 ASSESSMENT — ENCOUNTER SYMPTOMS
CONSTIPATION: 0
SHORTNESS OF BREATH: 0
PALPITATIONS: 0
HEMATOCHEZIA: 0
EYE PAIN: 0
NAUSEA: 0
HEADACHES: 0
JOINT SWELLING: 0
ABDOMINAL PAIN: 0
DIARRHEA: 0
FREQUENCY: 0
FEVER: 0
DIZZINESS: 0
HEARTBURN: 0
WEAKNESS: 0
PARESTHESIAS: 0
ARTHRALGIAS: 1
CHILLS: 0
HEMATURIA: 0
MYALGIAS: 0
DYSURIA: 0
NERVOUS/ANXIOUS: 0
COUGH: 0
SORE THROAT: 0

## 2023-03-08 ASSESSMENT — ACTIVITIES OF DAILY LIVING (ADL): CURRENT_FUNCTION: NO ASSISTANCE NEEDED

## 2023-03-14 ENCOUNTER — OFFICE VISIT (OUTPATIENT)
Dept: INTERNAL MEDICINE | Facility: CLINIC | Age: 73
End: 2023-03-14
Payer: COMMERCIAL

## 2023-03-14 VITALS
WEIGHT: 194.1 LBS | TEMPERATURE: 97.8 F | BODY MASS INDEX: 27.17 KG/M2 | HEART RATE: 56 BPM | HEIGHT: 71 IN | SYSTOLIC BLOOD PRESSURE: 138 MMHG | DIASTOLIC BLOOD PRESSURE: 84 MMHG | RESPIRATION RATE: 15 BRPM | OXYGEN SATURATION: 97 %

## 2023-03-14 DIAGNOSIS — I10 ESSENTIAL HYPERTENSION, BENIGN: ICD-10-CM

## 2023-03-14 DIAGNOSIS — M45.8 ANKYLOSING SPONDYLITIS OF SACRAL REGION (H): ICD-10-CM

## 2023-03-14 DIAGNOSIS — E78.5 HYPERLIPIDEMIA LDL GOAL <130: ICD-10-CM

## 2023-03-14 DIAGNOSIS — Z12.5 SCREENING PSA (PROSTATE SPECIFIC ANTIGEN): ICD-10-CM

## 2023-03-14 DIAGNOSIS — Z00.00 ENCOUNTER FOR SUBSEQUENT ANNUAL WELLNESS VISIT IN MEDICARE PATIENT: Primary | ICD-10-CM

## 2023-03-14 DIAGNOSIS — N52.9 ERECTILE DYSFUNCTION, UNSPECIFIED ERECTILE DYSFUNCTION TYPE: ICD-10-CM

## 2023-03-14 DIAGNOSIS — Z12.11 COLON CANCER SCREENING: ICD-10-CM

## 2023-03-14 LAB
ALBUMIN SERPL BCG-MCNC: 4.4 G/DL (ref 3.5–5.2)
ALP SERPL-CCNC: 86 U/L (ref 40–129)
ALT SERPL W P-5'-P-CCNC: 15 U/L (ref 10–50)
ANION GAP SERPL CALCULATED.3IONS-SCNC: 14 MMOL/L (ref 7–15)
AST SERPL W P-5'-P-CCNC: 24 U/L (ref 10–50)
BILIRUB SERPL-MCNC: 1 MG/DL
BUN SERPL-MCNC: 18.1 MG/DL (ref 8–23)
CALCIUM SERPL-MCNC: 9.1 MG/DL (ref 8.8–10.2)
CHLORIDE SERPL-SCNC: 103 MMOL/L (ref 98–107)
CHOLEST SERPL-MCNC: 217 MG/DL
CREAT SERPL-MCNC: 0.83 MG/DL (ref 0.67–1.17)
DEPRECATED HCO3 PLAS-SCNC: 22 MMOL/L (ref 22–29)
ERYTHROCYTE [DISTWIDTH] IN BLOOD BY AUTOMATED COUNT: 13.4 % (ref 10–15)
GFR SERPL CREATININE-BSD FRML MDRD: >90 ML/MIN/1.73M2
GLUCOSE SERPL-MCNC: 92 MG/DL (ref 70–99)
HCT VFR BLD AUTO: 41.7 % (ref 40–53)
HDLC SERPL-MCNC: 71 MG/DL
HGB BLD-MCNC: 13.9 G/DL (ref 13.3–17.7)
LDLC SERPL CALC-MCNC: 124 MG/DL
MCH RBC QN AUTO: 32.6 PG (ref 26.5–33)
MCHC RBC AUTO-ENTMCNC: 33.3 G/DL (ref 31.5–36.5)
MCV RBC AUTO: 98 FL (ref 78–100)
NONHDLC SERPL-MCNC: 146 MG/DL
PLATELET # BLD AUTO: 166 10E3/UL (ref 150–450)
POTASSIUM SERPL-SCNC: 4.4 MMOL/L (ref 3.4–5.3)
PROT SERPL-MCNC: 7.3 G/DL (ref 6.4–8.3)
PSA SERPL DL<=0.01 NG/ML-MCNC: 4.14 NG/ML (ref 0–6.5)
RBC # BLD AUTO: 4.27 10E6/UL (ref 4.4–5.9)
SODIUM SERPL-SCNC: 139 MMOL/L (ref 136–145)
TRIGL SERPL-MCNC: 110 MG/DL
WBC # BLD AUTO: 6.2 10E3/UL (ref 4–11)

## 2023-03-14 PROCEDURE — 80061 LIPID PANEL: CPT | Performed by: INTERNAL MEDICINE

## 2023-03-14 PROCEDURE — G0439 PPPS, SUBSEQ VISIT: HCPCS | Performed by: INTERNAL MEDICINE

## 2023-03-14 PROCEDURE — 80053 COMPREHEN METABOLIC PANEL: CPT | Performed by: INTERNAL MEDICINE

## 2023-03-14 PROCEDURE — G0103 PSA SCREENING: HCPCS | Performed by: INTERNAL MEDICINE

## 2023-03-14 PROCEDURE — 99214 OFFICE O/P EST MOD 30 MIN: CPT | Mod: 25 | Performed by: INTERNAL MEDICINE

## 2023-03-14 PROCEDURE — 85027 COMPLETE CBC AUTOMATED: CPT | Performed by: INTERNAL MEDICINE

## 2023-03-14 PROCEDURE — 36415 COLL VENOUS BLD VENIPUNCTURE: CPT | Performed by: INTERNAL MEDICINE

## 2023-03-14 RX ORDER — IRBESARTAN 150 MG/1
TABLET ORAL
Qty: 90 TABLET | Refills: 3 | Status: SHIPPED | OUTPATIENT
Start: 2023-03-14 | End: 2023-10-26

## 2023-03-14 RX ORDER — TADALAFIL 20 MG/1
TABLET ORAL
Qty: 30 TABLET | Refills: 4 | Status: SHIPPED | OUTPATIENT
Start: 2023-03-14 | End: 2023-10-26

## 2023-03-14 ASSESSMENT — ENCOUNTER SYMPTOMS
PARESTHESIAS: 0
DYSURIA: 0
MYALGIAS: 0
COUGH: 0
CONSTIPATION: 0
SORE THROAT: 0
WEAKNESS: 0
NAUSEA: 0
SHORTNESS OF BREATH: 0
HEARTBURN: 0
JOINT SWELLING: 0
DIARRHEA: 0
HEMATOCHEZIA: 0
FREQUENCY: 0
ABDOMINAL PAIN: 0
NERVOUS/ANXIOUS: 0
HEADACHES: 0
DIZZINESS: 0
ARTHRALGIAS: 1
CHILLS: 0
HEMATURIA: 0
FEVER: 0
EYE PAIN: 0
PALPITATIONS: 0

## 2023-03-14 ASSESSMENT — ACTIVITIES OF DAILY LIVING (ADL): CURRENT_FUNCTION: NO ASSISTANCE NEEDED

## 2023-03-14 NOTE — PROGRESS NOTES
"SUBJECTIVE:   Saji is a 72 year old who presents for Preventive Visit.  Patient has been advised of split billing requirements and indicates understanding: Yes  Are you in the first 12 months of your Medicare coverage?  No    Healthy Habits:     In general, how would you rate your overall health?  Excellent    Frequency of exercise:  4-5 days/week    Duration of exercise:  30-45 minutes    Do you usually eat at least 4 servings of fruit and vegetables a day, include whole grains    & fiber and avoid regularly eating high fat or \"junk\" foods?  No    Taking medications regularly:  Yes    Medication side effects:  None    Ability to successfully perform activities of daily living:  No assistance needed    Home Safety:  No safety concerns identified    Hearing Impairment:  No hearing concerns    In the past 6 months, have you been bothered by leaking of urine?  No    In general, how would you rate your overall mental or emotional health?  Excellent      PHQ-2 Total Score: 0    Additional concerns today:  No      Have you ever done Advance Care Planning? (For example, a Health Directive, POLST, or a discussion with a medical provider or your loved ones about your wishes): Yes, advance care planning is on file.       Fall risk:  low    Cognitive Screening   1) Repeat 3 items (Leader, Season, Table)    2) Clock draw: NORMAL  3) 3 item recall: Recalls 2 objects   Results: NORMAL clock, 1-2 items recalled: COGNITIVE IMPAIRMENT LESS LIKELY    Mini-CogTM Copyright DONAVON Herrera. Licensed by the author for use in [facility]; reprinted with permission (daja@.[facility]). All rights reserved.      Do you have sleep apnea, excessive snoring or daytime drowsiness?: no    Reviewed and updated as needed this visit by clinical staff   Tobacco  Allergies  Meds              Reviewed and updated as needed this visit by Provider                 Social History     Tobacco Use     Smoking status: Never     Smokeless tobacco: Never "   Substance Use Topics     Alcohol use: Yes     Comment: Occasionally.       Alcohol Use 3/8/2023   Prescreen: >3 drinks/day or >7 drinks/week? No       Current Outpatient Medications   Medication     aspirin (ASA) 81 MG EC tablet     irbesartan (AVAPRO) 150 MG tablet     tadalafil (CIALIS) 20 MG tablet     No current facility-administered medications for this visit.         Current providers sharing in care for this patient include:   Patient Care Team:  Yared Gallardo MD as PCP - General  Yared Gallardo MD as Assigned PCP    The following health maintenance items are reviewed in Epic and correct as of today:  Health Maintenance   Topic Date Due     ANNUAL REVIEW OF HM ORDERS  07/06/2022     MEDICARE ANNUAL WELLNESS VISIT  12/14/2022     PSA  12/14/2023     FALL RISK ASSESSMENT  03/14/2024     DTAP/TDAP/TD IMMUNIZATION (2 - Td or Tdap) 07/15/2024     LIPID  12/14/2026     ADVANCE CARE PLANNING  03/14/2028     COLORECTAL CANCER SCREENING  10/03/2028     HEPATITIS C SCREENING  Completed     PHQ-2 (once per calendar year)  Completed     INFLUENZA VACCINE  Completed     Pneumococcal Vaccine: 65+ Years  Completed     ZOSTER IMMUNIZATION  Completed     AORTIC ANEURYSM SCREENING (SYSTEM ASSIGNED)  Completed     COVID-19 Vaccine  Completed     IPV IMMUNIZATION  Aged Out     MENINGITIS IMMUNIZATION  Aged Out       Immunization History   Administered Date(s) Administered     COVID-19 Vaccine 12+ (Pfizer 2022) 04/28/2022     COVID-19 Vaccine 12+ (Pfizer) 01/23/2021, 02/13/2021, 10/08/2021     COVID-19 Vaccine Bivalent Booster 12+ (Pfizer) 09/30/2022     FLUAD(HD)65+ QUAD 10/08/2021     Influenza (High Dose) 3 valent vaccine 09/25/2017, 10/01/2018, 09/19/2019     Influenza (IIV3) PF 12/14/2000, 11/26/2003     Influenza Vaccine 65+ (Fluzone HD) 10/12/2020, 09/30/2022     Pneumo Conj 13-V (2010&after) 08/09/2016     Pneumococcal 23 valent 09/16/2014     TDAP Vaccine (Adacel) 07/15/2014     Zoster vaccine recombinant  "adjuvanted (SHINGRIX) 12/18/2019, 05/27/2020         Review of Systems   Constitutional: Negative for chills and fever.   HENT: Negative for congestion, ear pain, hearing loss and sore throat.    Eyes: Negative for pain and visual disturbance.   Respiratory: Negative for cough and shortness of breath.    Cardiovascular: Negative for chest pain, palpitations and peripheral edema.   Gastrointestinal: Negative for abdominal pain, constipation, diarrhea, heartburn, hematochezia and nausea.   Genitourinary: Negative for dysuria, frequency, genital sores, hematuria, impotence, penile discharge and urgency.   Musculoskeletal: Positive for arthralgias. Negative for joint swelling and myalgias.   Skin: Negative for rash.   Neurological: Negative for dizziness, weakness, headaches and paresthesias.   Psychiatric/Behavioral: Negative for mood changes. The patient is not nervous/anxious.        OBJECTIVE:   /84   Pulse 56   Temp 97.8  F (36.6  C) (Temporal)   Resp 15   Ht 1.803 m (5' 11\")   Wt 88 kg (194 lb 1.6 oz)   SpO2 97%   BMI 27.07 kg/m   Estimated body mass index is 27.07 kg/m  as calculated from the following:    Height as of this encounter: 1.803 m (5' 11\").    Weight as of this encounter: 88 kg (194 lb 1.6 oz).     Physical Exam:    GENERAL: alert and no distress  EYES: Eyes grossly normal to inspection, PERRL and conjunctivae and sclerae normal  HENT: ear canals and TM's normal, nose and mouth without ulcers or lesions  NECK: no adenopathy, no asymmetry, masses, or scars and thyroid normal to palpation  RESP: lungs clear to auscultation - no rales, rhonchi or wheezes  CV: regular rate and rhythm, normal S1 S2, no S3 or S4, no murmur, click or rub,  MS: no gross musculoskeletal defects noted  NEURO: No focal changes  PSYCH: mentation appears normal, affect normal/bright    ASSESSMENT / PLAN:     (Z00.00) Encounter for subsequent annual wellness visit in Medicare patient  (primary encounter " diagnosis)  Comment: Stable and doing well and recommend annual wellness exam  Plan: CBC with platelets, Comprehensive metabolic         panel, Lipid Profile            (I10) Essential hypertension, benign  Comment: Currently at goal on therapy continue with medical management, medication refilled.  Consider blood pressure check 6-month  Plan: Comprehensive metabolic panel, irbesartan         (AVAPRO) 150 MG tablet, OFFICE/OUTPT         VISIT,EST,LEVL III            (E78.5) Hyperlipidemia LDL goal <130  Comment: Labs ordered as fasting per routine  Plan: Lipid Profile            (M45.8) Ankylosing spondylitis of sacral region (H)  Comment: Stable.  Using as needed anti-inflammatories.  Plan:     (Z12.11) Colon cancer screening  Comment: Prior colonoscopy reviewed.  Recommend fit test  Plan: Fecal colorectal cancer screen FIT            (Z12.5) Screening PSA (prostate specific antigen)  Comment: Ordered as routine screening  Plan: Prostate Specific Antigen Screen            (N52.9) Erectile dysfunction, unspecified erectile dysfunction type  Comment: Stable, patient recommended treatment medication refill.  Plan: OFFICE/OUTPT VISIT,EST,LEVL III, tadalafil         (CIALIS) 20 MG tablet            Patient has been advised of split billing requirements and indicates understanding: Yes    COUNSELING:  Reviewed preventive health counseling, as reflected in patient instructions       Regular exercise       Healthy diet/nutrition      He reports that he has never smoked. He has never used smokeless tobacco.    Appropriate preventive services were discussed with this patient, including applicable screening as appropriate for cardiovascular disease, diabetes, osteopenia/osteoporosis, and glaucoma.  As appropriate for age/gender, discussed screening for colorectal cancer, prostate cancer, breast cancer, and cervical cancer. Checklist reviewing preventive services available has been given to the patient.    Reviewed patients  plan of care and provided an AVS. The Basic Care Plan (routine screening as documented in Health Maintenance) for Saji meets the Care Plan requirement. This Care Plan has been established and reviewed with the Patient.    Yared Gallardo MD  [facility]    Identified Health Risks:

## 2023-03-30 ENCOUNTER — APPOINTMENT (OUTPATIENT)
Dept: URBAN - METROPOLITAN AREA CLINIC 256 | Age: 73
Setting detail: DERMATOLOGY
End: 2023-03-30

## 2023-03-30 VITALS — HEIGHT: 70 IN | WEIGHT: 182 LBS

## 2023-03-30 DIAGNOSIS — L57.8 OTHER SKIN CHANGES DUE TO CHRONIC EXPOSURE TO NONIONIZING RADIATION: ICD-10-CM

## 2023-03-30 DIAGNOSIS — D18.0 HEMANGIOMA: ICD-10-CM

## 2023-03-30 DIAGNOSIS — L81.4 OTHER MELANIN HYPERPIGMENTATION: ICD-10-CM

## 2023-03-30 DIAGNOSIS — L82.1 OTHER SEBORRHEIC KERATOSIS: ICD-10-CM

## 2023-03-30 DIAGNOSIS — L82.0 INFLAMED SEBORRHEIC KERATOSIS: ICD-10-CM

## 2023-03-30 DIAGNOSIS — Z85.828 PERSONAL HISTORY OF OTHER MALIGNANT NEOPLASM OF SKIN: ICD-10-CM

## 2023-03-30 DIAGNOSIS — L57.0 ACTINIC KERATOSIS: ICD-10-CM

## 2023-03-30 DIAGNOSIS — L85.3 XEROSIS CUTIS: ICD-10-CM

## 2023-03-30 DIAGNOSIS — Z71.89 OTHER SPECIFIED COUNSELING: ICD-10-CM

## 2023-03-30 DIAGNOSIS — D22 MELANOCYTIC NEVI: ICD-10-CM

## 2023-03-30 PROBLEM — D22.5 MELANOCYTIC NEVI OF TRUNK: Status: ACTIVE | Noted: 2023-03-30

## 2023-03-30 PROBLEM — D18.01 HEMANGIOMA OF SKIN AND SUBCUTANEOUS TISSUE: Status: ACTIVE | Noted: 2023-03-30

## 2023-03-30 PROCEDURE — OTHER LIQUID NITROGEN: OTHER

## 2023-03-30 PROCEDURE — 99213 OFFICE O/P EST LOW 20 MIN: CPT | Mod: 25

## 2023-03-30 PROCEDURE — 17110 DESTRUCT B9 LESION 1-14: CPT

## 2023-03-30 PROCEDURE — OTHER PRESCRIPTION: OTHER

## 2023-03-30 PROCEDURE — OTHER MIPS QUALITY: OTHER

## 2023-03-30 PROCEDURE — OTHER MEDICATION COUNSELING: OTHER

## 2023-03-30 PROCEDURE — OTHER COUNSELING: OTHER

## 2023-03-30 RX ORDER — HYDROCORTISONE 25 MG/G
2.5% CREAM TOPICAL BID
Qty: 453.6 | Refills: 2 | Status: ERX | COMMUNITY
Start: 2023-03-30

## 2023-03-30 ASSESSMENT — LOCATION ZONE DERM
LOCATION ZONE: TRUNK
LOCATION ZONE: NECK
LOCATION ZONE: FACE

## 2023-03-30 ASSESSMENT — LOCATION SIMPLE DESCRIPTION DERM
LOCATION SIMPLE: UPPER BACK
LOCATION SIMPLE: LEFT UPPER BACK
LOCATION SIMPLE: RIGHT CHEEK
LOCATION SIMPLE: RIGHT UPPER BACK
LOCATION SIMPLE: LEFT ANTERIOR NECK
LOCATION SIMPLE: CHEST

## 2023-03-30 ASSESSMENT — LOCATION DETAILED DESCRIPTION DERM
LOCATION DETAILED: LEFT MEDIAL UPPER BACK
LOCATION DETAILED: STERNAL NOTCH
LOCATION DETAILED: INFERIOR THORACIC SPINE
LOCATION DETAILED: LEFT SUPERIOR UPPER BACK
LOCATION DETAILED: LEFT CLAVICULAR NECK
LOCATION DETAILED: RIGHT CENTRAL MALAR CHEEK
LOCATION DETAILED: RIGHT SUPERIOR MEDIAL UPPER BACK
LOCATION DETAILED: LEFT INFERIOR MEDIAL UPPER BACK

## 2023-03-30 NOTE — PROCEDURE: MEDICATION COUNSELING
Xellizethz Pregnancy And Lactation Text: This medication is Pregnancy Category D and is not considered safe during pregnancy.  The risk during breast feeding is also uncertain.

## 2023-03-30 NOTE — PROCEDURE: COUNSELING
Detail Level: Generalized
Patient Specific Counseling (Will Not Stick From Patient To Patient): - Discussed doing 5FU and patient declined doing topical treatment \\n- Patient asked about blue light treatment (Patient declined treatment)\\n- Discussed doing Hydrocortisone cream for two weeks then come back in so we can see where the sun damage is
Detail Level: Detailed

## 2023-03-30 NOTE — PROCEDURE: MEDICATION COUNSELING
----- Message from Jaya Nielsen MD sent at 3/5/2018  2:13 PM CST -----  Reduce tac to 1/1   Minoxidil Counseling: Minoxidil is a topical medication which can increase blood flow where it is applied. It is uncertain how this medication increases hair growth. Side effects are uncommon and include stinging and allergic reactions.

## 2023-03-30 NOTE — PROCEDURE: MEDICATION COUNSELING
ATTEMPTED TO CONTACT PATIENT AGAIN, VOICEMAIL NOT SET UP. EMERGENCY CONTACT NUMBER FOR SPOUSE IS INCORRECT. NO ONE BY THAT NAME AT THAT NUMBER.   Quinolones Counseling:  I discussed with the patient the risks of fluoroquinolones including but not limited to GI upset, allergic reaction, drug rash, diarrhea, dizziness, photosensitivity, yeast infections, liver function test abnormalities, tendonitis/tendon rupture.

## 2023-03-30 NOTE — PROCEDURE: LIQUID NITROGEN
Include Z78.9 (Other Specified Conditions Influencing Health Status) As An Associated Diagnosis?: No
Medical Necessity Clause: This procedure was medically necessary because the lesions that were treated were:
Show Aperture Variable?: Yes
Post-Care Instructions: - Avoid picking at any of the treated lesions.
Number Of Freeze-Thaw Cycles: 3 freeze-thaw cycles
Application Tool (Optional): Liquid Nitrogen Sprayer
Consent: - Verbal and written consent was obtained, and risks were reviewed prior to procedure today. \\n- Risks discussed include but are not limited to pain, crusting, scabbing, blistering, scarring, temporary or permanent darker or lighter pigmentary change, recurrence, incomplete resolution, and infection.
Duration Of Freeze Thaw-Cycle (Seconds): 5-10
Medical Necessity Information: It is in your best interest to select a reason for this procedure from the list below. All of these items fulfill various CMS LCD requirements except the new and changing color options.
Detail Level: Detailed
Spray Paint Text: The liquid nitrogen was applied to the skin utilizing a spray paint frosting technique.

## 2023-04-13 ENCOUNTER — APPOINTMENT (OUTPATIENT)
Dept: URBAN - METROPOLITAN AREA CLINIC 256 | Age: 73
Setting detail: DERMATOLOGY
End: 2023-04-14

## 2023-04-13 VITALS — WEIGHT: 189 LBS | HEIGHT: 70 IN

## 2023-04-13 DIAGNOSIS — L57.0 ACTINIC KERATOSIS: ICD-10-CM

## 2023-04-13 PROCEDURE — OTHER ADDITIONAL NOTES: OTHER

## 2023-04-13 PROCEDURE — OTHER LIQUID NITROGEN: OTHER

## 2023-04-13 PROCEDURE — 17003 DESTRUCT PREMALG LES 2-14: CPT

## 2023-04-13 PROCEDURE — OTHER MIPS QUALITY: OTHER

## 2023-04-13 PROCEDURE — 17000 DESTRUCT PREMALG LESION: CPT

## 2023-04-13 PROCEDURE — OTHER COUNSELING: OTHER

## 2023-04-13 ASSESSMENT — LOCATION SIMPLE DESCRIPTION DERM
LOCATION SIMPLE: CHEST
LOCATION SIMPLE: LEFT CHEEK
LOCATION SIMPLE: RIGHT CHEEK

## 2023-04-13 ASSESSMENT — LOCATION DETAILED DESCRIPTION DERM
LOCATION DETAILED: LEFT SUPERIOR LATERAL MALAR CHEEK
LOCATION DETAILED: STERNAL NOTCH
LOCATION DETAILED: RIGHT MEDIAL MALAR CHEEK

## 2023-04-13 ASSESSMENT — LOCATION ZONE DERM
LOCATION ZONE: TRUNK
LOCATION ZONE: FACE

## 2023-04-13 NOTE — PROCEDURE: LIQUID NITROGEN
Number Of Freeze-Thaw Cycles: 3 freeze-thaw cycles
Render Note In Bullet Format When Appropriate: No
Consent: The patient's consent was obtained including but not limited to risks of crusting, scabbing, blistering, scarring, darker or lighter pigmentary change, recurrence, incomplete removal and infection.
Duration Of Freeze Thaw-Cycle (Seconds): 5
Post-Care Instructions: I reviewed with the patient in detail post-care instructions. Patient is to wear sunprotection, and avoid picking at any of the treated lesions. Pt may apply Vaseline to crusted or scabbing areas.
Show Aperture Variable?: Yes
Detail Level: Detailed

## 2023-04-13 NOTE — PROCEDURE: ADDITIONAL NOTES
Render Risk Assessment In Note?: no
Detail Level: Simple
Additional Notes: Patient is to apply hydrocortisone onto dry spots on arms. If these patches do not resolve, he is to return to clinic for reassessment and possible LN treatment. Patient expressed understanding.

## 2023-08-04 NOTE — PROCEDURE: MOHS SURGERY
to baseline. Lactic currently pending cultures also currently pending. Imaging interpreted and reviewed by myself: CT Head showed no evidence of intracranial mass, SAH or other ICH. CT chest abdomen pelvis showed no evidence of acute traumatic injury, pleural effusions, pneumothorax, ureterolithiasis, nephrolithiasis, appendicitis, cholecystitis, bowel obstruction, or intra-abdominal mass. CT C spine showed no evidence of acute fracture, mass, or spinal cord compression CT T spine showed no evidence of acute fracture, mass, or spinal cord compression CT L spine showed no evidence of acute fracture, mass, or spinal cord compression  EKG interpreted by myself as above, concerning for A-fib with RVR    Patient was given the following medications:  Medications   dilTIAZem injection 19 mg (19 mg IntraVENous Given 8/4/23 0113)     Followed by   dilTIAZem 100 mg in dextrose 5 % 100 mL infusion (ADD-[place]) (5 mg/hr IntraVENous NoRateChange 8/4/23 0201)   sodium chloride 0.9 % bolus 1,000 mL (1,000 mLs IntraVENous New Bag 8/4/23 0129)   potassium chloride 10 mEq/100 mL IVPB (Peripheral Line) (10 mEq IntraVENous New Bag 8/4/23 0230)   vancomycin (VANCOCIN) 2000 mg in 400 mL IVPB (2,000 mg IntraVENous New Bag 8/4/23 0210)   furosemide (LASIX) injection 40 mg (40 mg IntraVENous Given 8/4/23 0114)   ceFEPIme (MAXIPIME) 2,000 mg in sodium chloride 0.9 % 50 mL IVPB (mini-bag) (0 mg IntraVENous Stopped 8/4/23 0209)       Disposition Discussion:  Patient appeared to be in A-fib with RVR so was started on Cardizem which had helped his heart rate. Initially I had ordered Lasix as the patient appeared to be somewhat fluid overloaded but chest CT did not show evidence of large effusion and labs were concerning for significant leukocytosis with mild renal insufficiency so I did add on fluid. We are going to infuse this at 250/h. No obvious source for infection but have covered for broad spectrum antibiotics.   Patient also had I agree with scribe documentation. [employer] Repair Preamble Text (Leave Blank If You Do Not Want): Undermining was performed with blunt dissection.

## 2023-10-05 ENCOUNTER — MYC MEDICAL ADVICE (OUTPATIENT)
Dept: INTERNAL MEDICINE | Facility: CLINIC | Age: 73
End: 2023-10-05
Payer: COMMERCIAL

## 2023-10-05 DIAGNOSIS — I10 ESSENTIAL HYPERTENSION, BENIGN: ICD-10-CM

## 2023-10-05 RX ORDER — IRBESARTAN 150 MG/1
TABLET ORAL
Qty: 90 TABLET | Refills: 3 | OUTPATIENT
Start: 2023-10-05

## 2023-10-12 ENCOUNTER — TRANSFERRED RECORDS (OUTPATIENT)
Dept: HEALTH INFORMATION MANAGEMENT | Facility: CLINIC | Age: 73
End: 2023-10-12
Payer: COMMERCIAL

## 2023-10-12 ENCOUNTER — APPOINTMENT (OUTPATIENT)
Dept: URBAN - METROPOLITAN AREA CLINIC 256 | Age: 73
Setting detail: DERMATOLOGY
End: 2023-10-12

## 2023-10-12 VITALS — HEIGHT: 70 IN | WEIGHT: 185 LBS

## 2023-10-12 DIAGNOSIS — D22 MELANOCYTIC NEVI: ICD-10-CM

## 2023-10-12 DIAGNOSIS — Z85.828 PERSONAL HISTORY OF OTHER MALIGNANT NEOPLASM OF SKIN: ICD-10-CM

## 2023-10-12 DIAGNOSIS — L57.8 OTHER SKIN CHANGES DUE TO CHRONIC EXPOSURE TO NONIONIZING RADIATION: ICD-10-CM

## 2023-10-12 DIAGNOSIS — L82.1 OTHER SEBORRHEIC KERATOSIS: ICD-10-CM

## 2023-10-12 DIAGNOSIS — D18.0 HEMANGIOMA: ICD-10-CM

## 2023-10-12 DIAGNOSIS — Z71.89 OTHER SPECIFIED COUNSELING: ICD-10-CM

## 2023-10-12 DIAGNOSIS — D49.2 NEOPLASM OF UNSPECIFIED BEHAVIOR OF BONE, SOFT TISSUE, AND SKIN: ICD-10-CM

## 2023-10-12 DIAGNOSIS — L57.0 ACTINIC KERATOSIS: ICD-10-CM

## 2023-10-12 PROBLEM — D22.5 MELANOCYTIC NEVI OF TRUNK: Status: ACTIVE | Noted: 2023-10-12

## 2023-10-12 PROBLEM — D22.62 MELANOCYTIC NEVI OF LEFT UPPER LIMB, INCLUDING SHOULDER: Status: ACTIVE | Noted: 2023-10-12

## 2023-10-12 PROBLEM — D22.71 MELANOCYTIC NEVI OF RIGHT LOWER LIMB, INCLUDING HIP: Status: ACTIVE | Noted: 2023-10-12

## 2023-10-12 PROBLEM — D22.61 MELANOCYTIC NEVI OF RIGHT UPPER LIMB, INCLUDING SHOULDER: Status: ACTIVE | Noted: 2023-10-12

## 2023-10-12 PROBLEM — D18.01 HEMANGIOMA OF SKIN AND SUBCUTANEOUS TISSUE: Status: ACTIVE | Noted: 2023-10-12

## 2023-10-12 PROBLEM — D22.72 MELANOCYTIC NEVI OF LEFT LOWER LIMB, INCLUDING HIP: Status: ACTIVE | Noted: 2023-10-12

## 2023-10-12 PROCEDURE — OTHER SEPARATE AND IDENTIFIABLE DOCUMENTATION: OTHER

## 2023-10-12 PROCEDURE — OTHER PHOTO-DOCUMENTATION: OTHER

## 2023-10-12 PROCEDURE — OTHER MIPS QUALITY: OTHER

## 2023-10-12 PROCEDURE — OTHER BIOPSY BY SHAVE METHOD: OTHER

## 2023-10-12 PROCEDURE — 99213 OFFICE O/P EST LOW 20 MIN: CPT | Mod: 25

## 2023-10-12 PROCEDURE — 11102 TANGNTL BX SKIN SINGLE LES: CPT

## 2023-10-12 PROCEDURE — 17003 DESTRUCT PREMALG LES 2-14: CPT

## 2023-10-12 PROCEDURE — OTHER EDUCATIONAL RESOURCES PROVIDED: OTHER

## 2023-10-12 PROCEDURE — OTHER COUNSELING: OTHER

## 2023-10-12 PROCEDURE — OTHER LIQUID NITROGEN: OTHER

## 2023-10-12 PROCEDURE — OTHER SUNSCREEN RECOMMENDATIONS: OTHER

## 2023-10-12 PROCEDURE — 17000 DESTRUCT PREMALG LESION: CPT | Mod: 59

## 2023-10-12 PROCEDURE — OTHER PATIENT SPECIFIC COUNSELING: OTHER

## 2023-10-12 ASSESSMENT — LOCATION DETAILED DESCRIPTION DERM
LOCATION DETAILED: RIGHT SUPERIOR LATERAL MALAR CHEEK
LOCATION DETAILED: RIGHT ANTERIOR PROXIMAL THIGH
LOCATION DETAILED: PERIUMBILICAL SKIN
LOCATION DETAILED: RIGHT ANTERIOR DISTAL THIGH
LOCATION DETAILED: LEFT SUPERIOR LATERAL MALAR CHEEK
LOCATION DETAILED: LEFT SUPERIOR CENTRAL MALAR CHEEK
LOCATION DETAILED: LEFT INFERIOR LATERAL MALAR CHEEK
LOCATION DETAILED: LEFT DISTAL DORSAL FOREARM
LOCATION DETAILED: LEFT ANTERIOR PROXIMAL THIGH
LOCATION DETAILED: LEFT INFERIOR LATERAL MIDBACK
LOCATION DETAILED: RIGHT SUPERIOR LATERAL MIDBACK
LOCATION DETAILED: RIGHT DISTAL PRETIBIAL REGION
LOCATION DETAILED: LEFT INFERIOR CENTRAL MALAR CHEEK
LOCATION DETAILED: RIGHT SUPERIOR LATERAL BUCCAL CHEEK
LOCATION DETAILED: RIGHT LATERAL MALAR CHEEK
LOCATION DETAILED: LEFT LATERAL SUPERIOR CHEST
LOCATION DETAILED: LEFT CENTRAL MALAR CHEEK
LOCATION DETAILED: LEFT MEDIAL UPPER BACK
LOCATION DETAILED: LEFT VENTRAL PROXIMAL FOREARM
LOCATION DETAILED: MIDDLE STERNUM
LOCATION DETAILED: RIGHT PROXIMAL DORSAL FOREARM
LOCATION DETAILED: LEFT PROXIMAL PRETIBIAL REGION
LOCATION DETAILED: LEFT MEDIAL SUPERIOR CHEST
LOCATION DETAILED: RIGHT CENTRAL MALAR CHEEK
LOCATION DETAILED: LEFT ULNAR DORSAL HAND
LOCATION DETAILED: RIGHT VENTRAL DISTAL FOREARM
LOCATION DETAILED: RIGHT VENTRAL PROXIMAL FOREARM
LOCATION DETAILED: LEFT ANTERIOR DISTAL THIGH
LOCATION DETAILED: LEFT SUPERIOR UPPER BACK
LOCATION DETAILED: INFERIOR THORACIC SPINE
LOCATION DETAILED: LEFT VENTRAL DISTAL FOREARM
LOCATION DETAILED: LEFT LATERAL MALAR CHEEK
LOCATION DETAILED: EPIGASTRIC SKIN
LOCATION DETAILED: LEFT ANTECUBITAL SKIN

## 2023-10-12 ASSESSMENT — LOCATION SIMPLE DESCRIPTION DERM
LOCATION SIMPLE: LEFT PRETIBIAL REGION
LOCATION SIMPLE: LEFT UPPER BACK
LOCATION SIMPLE: ABDOMEN
LOCATION SIMPLE: RIGHT FOREARM
LOCATION SIMPLE: LEFT THIGH
LOCATION SIMPLE: RIGHT PRETIBIAL REGION
LOCATION SIMPLE: RIGHT CHEEK
LOCATION SIMPLE: RIGHT LOWER BACK
LOCATION SIMPLE: UPPER BACK
LOCATION SIMPLE: LEFT UPPER ARM
LOCATION SIMPLE: LEFT FOREARM
LOCATION SIMPLE: LEFT LOWER BACK
LOCATION SIMPLE: LEFT HAND
LOCATION SIMPLE: RIGHT THIGH
LOCATION SIMPLE: CHEST
LOCATION SIMPLE: LEFT CHEEK

## 2023-10-12 ASSESSMENT — LOCATION ZONE DERM
LOCATION ZONE: ARM
LOCATION ZONE: TRUNK
LOCATION ZONE: FACE
LOCATION ZONE: HAND
LOCATION ZONE: LEG

## 2023-10-12 NOTE — PROCEDURE: LIQUID NITROGEN
Post-Care Instructions: I reviewed with the patient in detail post-care instructions. Patient is to wear sunprotection, and avoid picking at any of the treated lesions. Pt may apply Vaseline to crusted or scabbing areas.
Show Aperture Variable?: Yes
Detail Level: Detailed
Render Note In Bullet Format When Appropriate: No
Number Of Freeze-Thaw Cycles: 1 freeze-thaw cycle
Duration Of Freeze Thaw-Cycle (Seconds): 6
Consent: The patient's consent was obtained including but not limited to risks of crusting, scabbing, blistering, scarring, darker or lighter pigmentary change, recurrence, incomplete removal and infection.
Application Tool (Optional): Liquid Nitrogen Sprayer

## 2023-10-25 ENCOUNTER — MYC MEDICAL ADVICE (OUTPATIENT)
Dept: INTERNAL MEDICINE | Facility: CLINIC | Age: 73
End: 2023-10-25
Payer: COMMERCIAL

## 2023-10-25 DIAGNOSIS — N52.9 ERECTILE DYSFUNCTION, UNSPECIFIED ERECTILE DYSFUNCTION TYPE: ICD-10-CM

## 2023-10-25 DIAGNOSIS — I10 ESSENTIAL HYPERTENSION, BENIGN: ICD-10-CM

## 2023-10-26 ENCOUNTER — APPOINTMENT (OUTPATIENT)
Dept: URBAN - METROPOLITAN AREA CLINIC 256 | Age: 73
Setting detail: DERMATOLOGY
End: 2023-10-26

## 2023-10-26 PROBLEM — C44.519 BASAL CELL CARCINOMA OF SKIN OF OTHER PART OF TRUNK: Status: ACTIVE | Noted: 2023-10-26

## 2023-10-26 PROCEDURE — OTHER EDUCATIONAL RESOURCES PROVIDED: OTHER

## 2023-10-26 PROCEDURE — OTHER COUNSELING: OTHER

## 2023-10-26 PROCEDURE — OTHER CURETTAGE AND DESTRUCTION: OTHER

## 2023-10-26 PROCEDURE — OTHER MIPS QUALITY: OTHER

## 2023-10-26 PROCEDURE — 17262 DSTRJ MAL LES T/A/L 1.1-2.0: CPT

## 2023-10-26 RX ORDER — IRBESARTAN 150 MG/1
TABLET ORAL
Qty: 90 TABLET | Refills: 0 | Status: SHIPPED | OUTPATIENT
Start: 2023-10-26 | End: 2023-12-12

## 2023-10-26 RX ORDER — TADALAFIL 20 MG/1
TABLET ORAL
Qty: 30 TABLET | Refills: 1 | Status: SHIPPED | OUTPATIENT
Start: 2023-10-26 | End: 2024-04-08

## 2023-10-26 NOTE — TELEPHONE ENCOUNTER
Pt needing a refill of irbesartan (AVAPRO) 150 MG tablet (he is completely out), as well as needing his other scripts sent to a new pharmacy as he is no longer wanting to use [facility].     Please let pt know when meds have been sent to [facility].     Routing to refill team for review.

## 2023-10-26 NOTE — PROCEDURE: CURETTAGE AND DESTRUCTION
Detail Level: Detailed
Consent was obtained from the patient. The risks, benefits and alternatives to therapy were discussed in detail. Specifically, the risks of infection, scarring, bleeding, prolonged wound healing, nerve injury, incomplete removal, allergy to anesthesia and recurrence were addressed. Alternatives to ED&C, such as: surgical removal and XRT were also discussed.  Prior to the procedure, the treatment site was clearly identified and confirmed by the patient.
Concentration (Mg/Ml Or Millions Of Plaque Forming Units/Cc): 0.01
Size Of Lesion In Cm: 1.2
What Was Performed First?: Curettage
Total Volume (Ccs): 1
Cautery Type: electrodesiccation
Number Of Curettages: 3
Bill For Surgical Tray: no
Additional Information: (Optional): The wound was cleaned, and a pressure dressing was applied.  The patient received detailed post-op instructions.
Anesthesia Type: 1% lidocaine with epinephrine
Bill As A Line Item Or As Units: Line Item
Size Of Lesion After Curettage: 1.4
Body Location Override (Optional - Billing Will Still Be Based On Selected Body Map Location If Applicable): left medial superior chest
Post-Care Instructions: I reviewed with the patient in detail post-care instructions. Patient is to keep the area dry for 48 hours, and not to engage in any swimming until the area is healed. Should the patient develop any fevers, chills, bleeding, severe pain patient will contact the office immediately.
Final Size Statement: The size of the lesion after curettage was

## 2023-12-11 DIAGNOSIS — I10 ESSENTIAL HYPERTENSION, BENIGN: ICD-10-CM

## 2023-12-12 RX ORDER — IRBESARTAN 150 MG/1
TABLET ORAL
Qty: 90 TABLET | Refills: 0 | Status: SHIPPED | OUTPATIENT
Start: 2023-12-12 | End: 2024-04-08

## 2023-12-12 NOTE — TELEPHONE ENCOUNTER
Prescription approved per [facility] Refill Protocol.  Tara Peterson, RN  [facility] Triage Nurse

## 2024-04-01 SDOH — HEALTH STABILITY: PHYSICAL HEALTH: ON AVERAGE, HOW MANY MINUTES DO YOU ENGAGE IN EXERCISE AT THIS LEVEL?: 40 MIN

## 2024-04-01 SDOH — HEALTH STABILITY: PHYSICAL HEALTH: ON AVERAGE, HOW MANY DAYS PER WEEK DO YOU ENGAGE IN MODERATE TO STRENUOUS EXERCISE (LIKE A BRISK WALK)?: 5 DAYS

## 2024-04-01 ASSESSMENT — SOCIAL DETERMINANTS OF HEALTH (SDOH): HOW OFTEN DO YOU GET TOGETHER WITH FRIENDS OR RELATIVES?: TWICE A WEEK

## 2024-04-08 ENCOUNTER — OFFICE VISIT (OUTPATIENT)
Dept: INTERNAL MEDICINE | Facility: CLINIC | Age: 74
End: 2024-04-08
Payer: COMMERCIAL

## 2024-04-08 VITALS
BODY MASS INDEX: 26.4 KG/M2 | HEART RATE: 80 BPM | HEIGHT: 71 IN | WEIGHT: 188.6 LBS | TEMPERATURE: 97.6 F | SYSTOLIC BLOOD PRESSURE: 138 MMHG | RESPIRATION RATE: 17 BRPM | OXYGEN SATURATION: 99 % | DIASTOLIC BLOOD PRESSURE: 76 MMHG

## 2024-04-08 DIAGNOSIS — Z12.11 COLON CANCER SCREENING: ICD-10-CM

## 2024-04-08 DIAGNOSIS — Z00.00 ENCOUNTER FOR SUBSEQUENT ANNUAL WELLNESS VISIT (AWV) IN MEDICARE PATIENT: Primary | ICD-10-CM

## 2024-04-08 DIAGNOSIS — N52.9 ERECTILE DYSFUNCTION, UNSPECIFIED ERECTILE DYSFUNCTION TYPE: ICD-10-CM

## 2024-04-08 DIAGNOSIS — Z12.5 SCREENING PSA (PROSTATE SPECIFIC ANTIGEN): ICD-10-CM

## 2024-04-08 DIAGNOSIS — M45.8 ANKYLOSING SPONDYLITIS OF SACRAL REGION (H): ICD-10-CM

## 2024-04-08 DIAGNOSIS — E78.5 HYPERLIPIDEMIA LDL GOAL <130: ICD-10-CM

## 2024-04-08 DIAGNOSIS — I10 ESSENTIAL HYPERTENSION, BENIGN: ICD-10-CM

## 2024-04-08 LAB
ALBUMIN SERPL BCG-MCNC: 4.7 G/DL (ref 3.5–5.2)
ALP SERPL-CCNC: 98 U/L (ref 40–150)
ALT SERPL W P-5'-P-CCNC: 17 U/L (ref 0–70)
ANION GAP SERPL CALCULATED.3IONS-SCNC: 13 MMOL/L (ref 7–15)
AST SERPL W P-5'-P-CCNC: 29 U/L (ref 0–45)
BILIRUB SERPL-MCNC: 0.8 MG/DL
BUN SERPL-MCNC: 15.1 MG/DL (ref 8–23)
CALCIUM SERPL-MCNC: 10 MG/DL (ref 8.8–10.2)
CHLORIDE SERPL-SCNC: 105 MMOL/L (ref 98–107)
CHOLEST SERPL-MCNC: 234 MG/DL
CREAT SERPL-MCNC: 0.89 MG/DL (ref 0.67–1.17)
DEPRECATED HCO3 PLAS-SCNC: 25 MMOL/L (ref 22–29)
EGFRCR SERPLBLD CKD-EPI 2021: 90 ML/MIN/1.73M2
ERYTHROCYTE [DISTWIDTH] IN BLOOD BY AUTOMATED COUNT: 12.5 % (ref 10–15)
FASTING STATUS PATIENT QL REPORTED: YES
GLUCOSE SERPL-MCNC: 97 MG/DL (ref 70–99)
HCT VFR BLD AUTO: 44.5 % (ref 40–53)
HDLC SERPL-MCNC: 87 MG/DL
HGB BLD-MCNC: 14.9 G/DL (ref 13.3–17.7)
LDLC SERPL CALC-MCNC: 132 MG/DL
MCH RBC QN AUTO: 31.9 PG (ref 26.5–33)
MCHC RBC AUTO-ENTMCNC: 33.5 G/DL (ref 31.5–36.5)
MCV RBC AUTO: 95 FL (ref 78–100)
NONHDLC SERPL-MCNC: 147 MG/DL
PLATELET # BLD AUTO: 148 10E3/UL (ref 150–450)
POTASSIUM SERPL-SCNC: 4.9 MMOL/L (ref 3.4–5.3)
PROT SERPL-MCNC: 8 G/DL (ref 6.4–8.3)
PSA SERPL DL<=0.01 NG/ML-MCNC: 3.83 NG/ML (ref 0–6.5)
RBC # BLD AUTO: 4.67 10E6/UL (ref 4.4–5.9)
SODIUM SERPL-SCNC: 143 MMOL/L (ref 135–145)
TRIGL SERPL-MCNC: 75 MG/DL
WBC # BLD AUTO: 6 10E3/UL (ref 4–11)

## 2024-04-08 PROCEDURE — G0103 PSA SCREENING: HCPCS | Performed by: INTERNAL MEDICINE

## 2024-04-08 PROCEDURE — 80061 LIPID PANEL: CPT | Performed by: INTERNAL MEDICINE

## 2024-04-08 PROCEDURE — 36415 COLL VENOUS BLD VENIPUNCTURE: CPT | Performed by: INTERNAL MEDICINE

## 2024-04-08 PROCEDURE — 85027 COMPLETE CBC AUTOMATED: CPT | Performed by: INTERNAL MEDICINE

## 2024-04-08 PROCEDURE — 80053 COMPREHEN METABOLIC PANEL: CPT | Performed by: INTERNAL MEDICINE

## 2024-04-08 PROCEDURE — G0439 PPPS, SUBSEQ VISIT: HCPCS | Performed by: INTERNAL MEDICINE

## 2024-04-08 PROCEDURE — 99214 OFFICE O/P EST MOD 30 MIN: CPT | Mod: 25 | Performed by: INTERNAL MEDICINE

## 2024-04-08 RX ORDER — IRBESARTAN 150 MG/1
TABLET ORAL
Qty: 90 TABLET | Refills: 3 | Status: SHIPPED | OUTPATIENT
Start: 2024-04-08

## 2024-04-08 RX ORDER — TADALAFIL 20 MG/1
TABLET ORAL
Qty: 30 TABLET | Refills: 5 | Status: SHIPPED | OUTPATIENT
Start: 2024-04-08

## 2024-04-08 RX ORDER — ATORVASTATIN CALCIUM 10 MG/1
10 TABLET, FILM COATED ORAL DAILY
Qty: 90 TABLET | Refills: 1 | Status: SHIPPED | OUTPATIENT
Start: 2024-04-08

## 2024-04-08 NOTE — PROGRESS NOTES
"Preventive Care Visit  [facility]  Yared Gallardo MD, Internal Medicine  Apr 8, 2024      Assessment & Plan     Encounter for subsequent annual wellness visit (AWV) in Medicare patient  Advised updated routine vaccinations as previously  - Comprehensive metabolic panel; Future  - CBC with platelets; Future  - Lipid Profile; Future    Essential hypertension, benign  Stable on therapy and continue with current medical management  - Comprehensive metabolic panel; Future  - irbesartan (AVAPRO) 150 MG tablet; TAKE 1 TABLET BY MOUTH EVERY DAY AT BEDTIME  - OFFICE/OUTPT VISIT,EST,LEVL III    Ankylosing spondylitis of sacral region (H)  Patient without current symptomatic complaints.    HYPERLIPIDEMIA LDL GOAL <130  Discussed with patient options available.  Would consider treatment based on coronary artery disease risk score.  - Lipid Profile; Future  OFFICE/OUTPT VISIT,EST,LEVL III  Suggest starting Lipitor pending results.  Prescription sent to pharmacy and medication risks and dosing discussed.    Colon cancer screening  Prior colonoscopy reviewed and suggest fit test  - Fecal colorectal cancer screen FIT; Future    Screening PSA (prostate specific antigen)  Ordered as routine screening.  - Prostate Specific Antigen Screen; Future    Erectile dysfunction, unspecified erectile dysfunction type  Refilled per patient request.  - tadalafil (CIALIS) 20 MG tablet; TAKE ONE TABLET BY MOUTH DAILY AS NEEDED FOR ERECTILE DYSFUNCTION. MAX 20MG DAILY. DO NOT USE WITH NTG PRODUCTS  - OFFICE/OUTPT VISIT,EST,LEVL III    BMI  Estimated body mass index is 26.3 kg/m  as calculated from the following:    Height as of this encounter: 1.803 m (5' 11\").    Weight as of this encounter: 85.5 kg (188 lb 9.6 oz).       Counseling  Appropriate preventive services were discussed with this patient, including applicable screening as appropriate for fall prevention, nutrition, physical activity, Tobacco-use cessation, weight " loss and cognition.  Checklist reviewing preventive services available has been given to the patient.  Reviewed patient's diet, addressing concerns and/or questions.   The patient reports drinking more than one alcoholic drink per day and sometimes engages in binge or excessive drinking. The patient was counseled and given information about possible harmful effects of excessive alcohol intake as well as where to get help for alcohol problems.     Work on weight loss  Regular exercise    Luis Alberto Lennon is a 73 year old, presenting for the following:    Wellness Visit    Health Care Directive  Patient has a Health Care Directive on file  Advance care planning document is on file and is current.    HPI    Saji states he feels well.  He denies any major complaints.  He has been spending his winter months in [place].  He is active and offers no exertional complaints.  He has been tolerating his medication without difficulty.  His recent labs, colonoscopy, immunizations and weight curve was reviewed.          4/1/2024   General Health   How would you rate your overall physical health? Excellent   Feel stress (tense, anxious, or unable to sleep) Not at all         4/1/2024   Nutrition   Diet: Regular (no restrictions)         4/1/2024   Exercise   Days per week of moderate/strenous exercise 5 days   Average minutes spent exercising at this level 40 min         4/1/2024   Social Factors   Frequency of gathering with friends or relatives Twice a week   Worry food won't last until get money to buy more No   Food not last or not have enough money for food? No   Do you have housing?  Yes   Are you worried about losing your housing? No   Lack of transportation? No   Unable to get utilities (heat,electricity)? No         4/8/2024   Fall Risk   Fallen 2 or more times in the past year? No   Trouble with walking or balance? No          4/1/2024   Activities of Daily Living- Home Safety   Needs help with the following daily  activites None of the above   Safety concerns in the home None of the above         4/1/2024   Dental   Dentist two times every year? Yes         4/1/2024   Hearing Screening   Hearing concerns? None of the above         4/1/2024   Driving Risk Screening   Patient/family members have concerns about driving No         4/1/2024   General Alertness/Fatigue Screening   Have you been more tired than usual lately? No         4/1/2024   Urinary Incontinence Screening   Bothered by leaking urine in past 6 months No         4/1/2024   TB Screening   Were you born outside of the [place]? No       Today's PHQ-2 Score:       4/8/2024     9:35 AM   PHQ-2 ( 1999 [employer])   Q1: Little interest or pleasure in doing things 0   Q2: Feeling down, depressed or hopeless 0   PHQ-2 Score 0   Q1: Little interest or pleasure in doing things Not at all   Q2: Feeling down, depressed or hopeless Not at all   PHQ-2 Score 0         4/1/2024   Substance Use   Alcohol more than 3/day or more than 7/wk Yes   How often do you have a drink containing alcohol 4 or more times a week   How many alcohol drinks on typical day 1 or 2   How often do you have 5+ drinks at one occasion Less than monthly   Audit 2/3 Score 1   How often not able to stop drinking once started Never   How often failed to do what normally expected Never   How often needed first drink in am after a heavy drinking session Never   How often feeling of guilt or remorse after drinking Never   How often unable to remember what happened the night before Never   Have you or someone else been injured because of your drinking No   Has anyone been concerned or suggested you cut down on drinking No   TOTAL SCORE - AUDIT 5   Do you have a current opioid prescription? No   How severe/bad is pain from 1 to 10? 2/10   Do you use any other substances recreationally? No     Social History     Tobacco Use    Smoking status: Never    Smokeless tobacco: Never   Vaping Use    Vaping Use: Never used    Substance Use Topics    Alcohol use: Yes     Comment: Occasionally.    Drug use: No           4/1/2024   AAA Screening   Family history of Abdominal Aortic Aneurysm (AAA)? No     ASCVD Risk   The 10-year ASCVD risk score (Lazaro BILLS, et al., 2019) is: 27.6%    Values used to calculate the score:      Age: 73 years      Sex: Male      Is Non- : No      Diabetic: No      Tobacco smoker: No      Systolic Blood Pressure: 138 mmHg      Is BP treated: Yes      HDL Cholesterol: 71 mg/dL      Total Cholesterol: 217 mg/dL    Reviewed and updated as needed this visit by Provider                  Lab work is in process  Current providers sharing in care for this patient include:  Patient Care Team:  Yared Gallardo MD as PCP - General  Yared Gallardo MD as Assigned PCP    The following health maintenance items are reviewed in Epic and correct as of today:  Health Maintenance   Topic Date Due    RSV VACCINE (Pregnancy & 60+) (1 - 1-dose 60+ series) Never done    LIPID  03/14/2024    ANNUAL REVIEW OF HM ORDERS  03/14/2024    DTAP/TDAP/TD IMMUNIZATION (2 - Td or Tdap) 07/15/2024    PSA  03/14/2025    MEDICARE ANNUAL WELLNESS VISIT  04/08/2025    FALL RISK ASSESSMENT  04/08/2025    GLUCOSE  03/14/2026    ADVANCE CARE PLANNING  04/08/2029    HEPATITIS C SCREENING  Completed    PHQ-2 (once per calendar year)  Completed    INFLUENZA VACCINE  Completed    Pneumococcal Vaccine: 65+ Years  Completed    ZOSTER IMMUNIZATION  Completed    COVID-19 Vaccine  Completed    IPV IMMUNIZATION  Aged Out    HPV IMMUNIZATION  Aged Out    MENINGITIS IMMUNIZATION  Aged Out    RSV MONOCLONAL ANTIBODY  Aged Out    COLORECTAL CANCER SCREENING  Discontinued       Review of Systems  CONSTITUTIONAL: NEGATIVE for fever, chills, change in weight  EYES: NEGATIVE for vision changes or irritation  ENT/MOUTH: NEGATIVE for ear, mouth and throat problems  RESP: NEGATIVE for significant cough or SOB  CV: NEGATIVE for chest pain,  "palpitations or peripheral edema  GI: NEGATIVE for nausea, abdominal pain, heartburn, or change in bowel habits  : NEGATIVE for frequency, dysuria, or hematuria  MUSCULOSKELETAL: NEGATIVE for significant arthralgias or myalgia  NEURO: NEGATIVE for weakness, dizziness or paresthesias  HEME: NEGATIVE for bleeding problems  PSYCHIATRIC: NEGATIVE for changes in mood or affect     Objective      Exam  /76   Pulse 91   Temp 97.6  F (36.4  C) (Temporal)   Resp 17   Ht 1.803 m (5' 11\")   Wt 85.5 kg (188 lb 9.6 oz)   SpO2 99%   BMI 26.30 kg/m     Estimated body mass index is 26.3 kg/m  as calculated from the following:    Height as of this encounter: 1.803 m (5' 11\").    Weight as of this encounter: 85.5 kg (188 lb 9.6 oz).    Physical Exam:    GENERAL: alert and no distress  EYES: Eyes grossly normal to inspection, PERRL and conjunctivae and sclerae normal  HENT: ear canals and TM's normal, nose and mouth without ulcers or lesions  RESP: lungs clear to auscultation - no rales, rhonchi or wheezes  CV: regular rate and rhythm, normal S1 S2, no S3 or S4, no murmur, click or rub, no peripheral edema  ABDOMEN: soft, nontender, no hepatosplenomegaly, no masses and bowel sounds normal  MS: no gross musculoskeletal defects noted, no edema  NEURO: No focal changes  PSYCH: mentation appears normal, affect normal/bright         4/8/2024   Mini Cog   Clock Draw Score 2 Normal   3 Item Recall 3 objects recalled   Mini Cog Total Score 5            Signed Electronically by: Yared Gallardo MD    "

## 2024-04-22 ENCOUNTER — TRANSFERRED RECORDS (OUTPATIENT)
Dept: HEALTH INFORMATION MANAGEMENT | Facility: CLINIC | Age: 74
End: 2024-04-22
Payer: COMMERCIAL

## 2024-04-22 ENCOUNTER — APPOINTMENT (OUTPATIENT)
Dept: URBAN - METROPOLITAN AREA CLINIC 256 | Age: 74
Setting detail: DERMATOLOGY
End: 2024-04-22

## 2024-04-22 VITALS — WEIGHT: 183 LBS | HEIGHT: 71 IN

## 2024-04-22 DIAGNOSIS — D22 MELANOCYTIC NEVI: ICD-10-CM

## 2024-04-22 DIAGNOSIS — D49.2 NEOPLASM OF UNSPECIFIED BEHAVIOR OF BONE, SOFT TISSUE, AND SKIN: ICD-10-CM

## 2024-04-22 DIAGNOSIS — Z71.89 OTHER SPECIFIED COUNSELING: ICD-10-CM

## 2024-04-22 DIAGNOSIS — L82.1 OTHER SEBORRHEIC KERATOSIS: ICD-10-CM

## 2024-04-22 DIAGNOSIS — Z85.828 PERSONAL HISTORY OF OTHER MALIGNANT NEOPLASM OF SKIN: ICD-10-CM

## 2024-04-22 DIAGNOSIS — L57.0 ACTINIC KERATOSIS: ICD-10-CM

## 2024-04-22 DIAGNOSIS — D18.0 HEMANGIOMA: ICD-10-CM

## 2024-04-22 DIAGNOSIS — L57.8 OTHER SKIN CHANGES DUE TO CHRONIC EXPOSURE TO NONIONIZING RADIATION: ICD-10-CM

## 2024-04-22 PROBLEM — D22.62 MELANOCYTIC NEVI OF LEFT UPPER LIMB, INCLUDING SHOULDER: Status: ACTIVE | Noted: 2024-04-22

## 2024-04-22 PROBLEM — D22.5 MELANOCYTIC NEVI OF TRUNK: Status: ACTIVE | Noted: 2024-04-22

## 2024-04-22 PROBLEM — D22.61 MELANOCYTIC NEVI OF RIGHT UPPER LIMB, INCLUDING SHOULDER: Status: ACTIVE | Noted: 2024-04-22

## 2024-04-22 PROBLEM — D18.01 HEMANGIOMA OF SKIN AND SUBCUTANEOUS TISSUE: Status: ACTIVE | Noted: 2024-04-22

## 2024-04-22 PROBLEM — D22.72 MELANOCYTIC NEVI OF LEFT LOWER LIMB, INCLUDING HIP: Status: ACTIVE | Noted: 2024-04-22

## 2024-04-22 PROBLEM — D22.71 MELANOCYTIC NEVI OF RIGHT LOWER LIMB, INCLUDING HIP: Status: ACTIVE | Noted: 2024-04-22

## 2024-04-22 PROCEDURE — OTHER LIQUID NITROGEN: OTHER

## 2024-04-22 PROCEDURE — OTHER SUNSCREEN RECOMMENDATIONS: OTHER

## 2024-04-22 PROCEDURE — OTHER PHOTO-DOCUMENTATION: OTHER

## 2024-04-22 PROCEDURE — OTHER MIPS QUALITY: OTHER

## 2024-04-22 PROCEDURE — 99213 OFFICE O/P EST LOW 20 MIN: CPT | Mod: 25

## 2024-04-22 PROCEDURE — OTHER COUNSELING: OTHER

## 2024-04-22 PROCEDURE — OTHER BIOPSY BY SHAVE METHOD: OTHER

## 2024-04-22 PROCEDURE — 11103 TANGNTL BX SKIN EA SEP/ADDL: CPT

## 2024-04-22 PROCEDURE — 11102 TANGNTL BX SKIN SINGLE LES: CPT | Mod: 59

## 2024-04-22 PROCEDURE — 17004 DESTROY PREMAL LESIONS 15/>: CPT

## 2024-04-22 PROCEDURE — OTHER EDUCATIONAL RESOURCES PROVIDED: OTHER

## 2024-04-22 PROCEDURE — OTHER SEPARATE AND IDENTIFIABLE DOCUMENTATION: OTHER

## 2024-04-22 ASSESSMENT — LOCATION DETAILED DESCRIPTION DERM
LOCATION DETAILED: LEFT VENTRAL DISTAL FOREARM
LOCATION DETAILED: LEFT INFERIOR CENTRAL MALAR CHEEK
LOCATION DETAILED: RIGHT POSTERIOR SHOULDER
LOCATION DETAILED: LEFT INFERIOR LATERAL MIDBACK
LOCATION DETAILED: LEFT SUPERIOR FOREHEAD
LOCATION DETAILED: LEFT CENTRAL MALAR CHEEK
LOCATION DETAILED: RIGHT SUPERIOR LATERAL MALAR CHEEK
LOCATION DETAILED: RIGHT VENTRAL PROXIMAL FOREARM
LOCATION DETAILED: LEFT SUPERIOR LATERAL NECK
LOCATION DETAILED: RIGHT INFERIOR POSTAURICULAR SKIN
LOCATION DETAILED: LEFT MEDIAL UPPER BACK
LOCATION DETAILED: RIGHT SUPERIOR CENTRAL MALAR CHEEK
LOCATION DETAILED: LEFT VENTRAL PROXIMAL FOREARM
LOCATION DETAILED: RIGHT SUPERIOR LATERAL UPPER BACK
LOCATION DETAILED: RIGHT ANTERIOR PROXIMAL THIGH
LOCATION DETAILED: LEFT DISTAL DORSAL FOREARM
LOCATION DETAILED: LEFT SUPERIOR CENTRAL MALAR CHEEK
LOCATION DETAILED: RIGHT VENTRAL DISTAL FOREARM
LOCATION DETAILED: LEFT POSTERIOR SHOULDER
LOCATION DETAILED: LEFT INFERIOR POSTAURICULAR SKIN
LOCATION DETAILED: MIDDLE STERNUM
LOCATION DETAILED: LEFT SUPERIOR LATERAL MALAR CHEEK
LOCATION DETAILED: LEFT SUPERIOR HELIX
LOCATION DETAILED: RIGHT LATERAL TRAPEZIAL NECK
LOCATION DETAILED: EPIGASTRIC SKIN
LOCATION DETAILED: RIGHT INFERIOR FOREHEAD
LOCATION DETAILED: LEFT ANTERIOR PROXIMAL THIGH
LOCATION DETAILED: RIGHT LATERAL FOREHEAD
LOCATION DETAILED: RIGHT INFERIOR CENTRAL MALAR CHEEK
LOCATION DETAILED: RIGHT PROXIMAL DORSAL FOREARM
LOCATION DETAILED: LEFT ANTECUBITAL SKIN
LOCATION DETAILED: LEFT ANTERIOR DISTAL THIGH
LOCATION DETAILED: RIGHT CENTRAL POSTAURICULAR SKIN
LOCATION DETAILED: RIGHT DISTAL DORSAL FOREARM
LOCATION DETAILED: INFERIOR THORACIC SPINE
LOCATION DETAILED: LEFT LATERAL INFERIOR CHEST
LOCATION DETAILED: RIGHT ANTERIOR DISTAL THIGH
LOCATION DETAILED: LEFT CLAVICULAR NECK

## 2024-04-22 ASSESSMENT — LOCATION SIMPLE DESCRIPTION DERM
LOCATION SIMPLE: LEFT LOWER BACK
LOCATION SIMPLE: RIGHT UPPER BACK
LOCATION SIMPLE: RIGHT THIGH
LOCATION SIMPLE: POSTERIOR NECK
LOCATION SIMPLE: LEFT ANTERIOR NECK
LOCATION SIMPLE: SCALP
LOCATION SIMPLE: CHEST
LOCATION SIMPLE: RIGHT FOREHEAD
LOCATION SIMPLE: RIGHT CHEEK
LOCATION SIMPLE: LEFT SHOULDER
LOCATION SIMPLE: LEFT FOREHEAD
LOCATION SIMPLE: RIGHT FOREARM
LOCATION SIMPLE: ABDOMEN
LOCATION SIMPLE: LEFT UPPER ARM
LOCATION SIMPLE: LEFT UPPER BACK
LOCATION SIMPLE: LEFT EAR
LOCATION SIMPLE: LEFT FOREARM
LOCATION SIMPLE: NECK
LOCATION SIMPLE: UPPER BACK
LOCATION SIMPLE: RIGHT SHOULDER
LOCATION SIMPLE: LEFT CHEEK
LOCATION SIMPLE: LEFT THIGH

## 2024-04-22 ASSESSMENT — LOCATION ZONE DERM
LOCATION ZONE: SCALP
LOCATION ZONE: EAR
LOCATION ZONE: TRUNK
LOCATION ZONE: FACE
LOCATION ZONE: LEG
LOCATION ZONE: NECK
LOCATION ZONE: ARM

## 2024-04-22 NOTE — PROCEDURE: BIOPSY BY SHAVE METHOD
Detail Level: Detailed
Depth Of Biopsy: dermis
Was A Bandage Applied: Yes
Size Of Lesion In Cm: 0
Biopsy Type: H and E
Biopsy Method: Personna blade
Anesthesia Type: 1% Xylocaine with 1:200,000 epinephrine and sodium bicarbonate
Anesthesia Volume In Cc: 0.5
Hemostasis: Drysol
Wound Care: Petrolatum
Dressing: bandage
Destruction After The Procedure: No
Type Of Destruction Used: Curettage
Curettage Text: The wound bed was treated with curettage after the biopsy was performed.
Cryotherapy Text: The wound bed was treated with cryotherapy after the biopsy was performed.
Electrodesiccation Text: The wound bed was treated with electrodesiccation after the biopsy was performed.
Electrodesiccation And Curettage Text: The wound bed was treated with electrodesiccation and curettage after the biopsy was performed.
Silver Nitrate Text: The wound bed was treated with silver nitrate after the biopsy was performed.
Lab: -[id number]
Path Notes (To The Dermatopathologist): Please check margins.
Consent: Written consent was obtained and risks were reviewed including but not limited to scarring, infection, bleeding, scabbing, incomplete removal, nerve damage and allergy to anesthesia.
Post-Care Instructions: I reviewed with the patient in detail post-care instructions. Patient is to keep the biopsy site dry overnight, and then apply bacitracin twice daily until healed.
Notification Instructions: Patient will be notified of biopsy results. However, patient instructed to call the office if not contacted within 2 weeks.
Billing Type: Third-Party Bill
Information: Selecting Yes will display possible errors in your note based on the variables you have selected. This validation is only offered as a suggestion for you. PLEASE NOTE THAT THE VALIDATION TEXT WILL BE REMOVED WHEN YOU FINALIZE YOUR NOTE. IF YOU WANT TO FAX A PRELIMINARY NOTE YOU WILL NEED TO TOGGLE THIS TO 'NO' IF YOU DO NOT WANT IT IN YOUR FAXED NOTE.

## 2024-04-22 NOTE — PROCEDURE: LIQUID NITROGEN
Duration Of Freeze Thaw-Cycle (Seconds): 6
Show Applicator Variable?: Yes
Consent: The patient's consent was obtained including but not limited to risks of crusting, scabbing, blistering, scarring, darker or lighter pigmentary change, recurrence, incomplete removal and infection.
Detail Level: Detailed
Render Note In Bullet Format When Appropriate: No
Post-Care Instructions: I reviewed with the patient in detail post-care instructions. Patient is to wear sunprotection, and avoid picking at any of the treated lesions. Pt may apply Vaseline to crusted or scabbing areas.
Application Tool (Optional): Liquid Nitrogen Sprayer
Number Of Freeze-Thaw Cycles: 1 freeze-thaw cycle

## 2024-04-22 NOTE — PROCEDURE: MIPS QUALITY
Quality 431: Preventive Care And Screening: Unhealthy Alcohol Use - Screening: Patient not screened for unhealthy alcohol use using a systematic screening method
Quality 47: Advance Care Plan: Advance Care Planning discussed and documented; advance care plan or surrogate decision maker documented in the medical record.
Quality 226: Preventive Care And Screening: Tobacco Use: Screening And Cessation Intervention: Patient screened for tobacco use and is an ex/non-smoker
Detail Level: Detailed
Quality 130: Documentation Of Current Medications In The Medical Record: Current Medications Documented

## 2024-10-04 ENCOUNTER — MYC MEDICAL ADVICE (OUTPATIENT)
Dept: INTERNAL MEDICINE | Facility: CLINIC | Age: 74
End: 2024-10-04
Payer: COMMERCIAL

## 2024-10-24 ENCOUNTER — APPOINTMENT (OUTPATIENT)
Dept: URBAN - METROPOLITAN AREA CLINIC 256 | Age: 74
Setting detail: DERMATOLOGY
End: 2024-10-24

## 2024-10-24 VITALS — WEIGHT: 192 LBS | HEIGHT: 70 IN

## 2024-10-24 DIAGNOSIS — L82.1 OTHER SEBORRHEIC KERATOSIS: ICD-10-CM

## 2024-10-24 DIAGNOSIS — D18.0 HEMANGIOMA: ICD-10-CM

## 2024-10-24 DIAGNOSIS — L57.0 ACTINIC KERATOSIS: ICD-10-CM

## 2024-10-24 DIAGNOSIS — D22 MELANOCYTIC NEVI: ICD-10-CM

## 2024-10-24 DIAGNOSIS — Z71.89 OTHER SPECIFIED COUNSELING: ICD-10-CM

## 2024-10-24 DIAGNOSIS — D49.2 NEOPLASM OF UNSPECIFIED BEHAVIOR OF BONE, SOFT TISSUE, AND SKIN: ICD-10-CM

## 2024-10-24 DIAGNOSIS — L82.0 INFLAMED SEBORRHEIC KERATOSIS: ICD-10-CM

## 2024-10-24 DIAGNOSIS — Z80.8 FAMILY HISTORY OF MALIGNANT NEOPLASM OF OTHER ORGANS OR SYSTEMS: ICD-10-CM

## 2024-10-24 DIAGNOSIS — Z85.828 PERSONAL HISTORY OF OTHER MALIGNANT NEOPLASM OF SKIN: ICD-10-CM

## 2024-10-24 DIAGNOSIS — L57.8 OTHER SKIN CHANGES DUE TO CHRONIC EXPOSURE TO NONIONIZING RADIATION: ICD-10-CM

## 2024-10-24 PROBLEM — D22.61 MELANOCYTIC NEVI OF RIGHT UPPER LIMB, INCLUDING SHOULDER: Status: ACTIVE | Noted: 2024-10-24

## 2024-10-24 PROBLEM — D22.62 MELANOCYTIC NEVI OF LEFT UPPER LIMB, INCLUDING SHOULDER: Status: ACTIVE | Noted: 2024-10-24

## 2024-10-24 PROBLEM — D22.72 MELANOCYTIC NEVI OF LEFT LOWER LIMB, INCLUDING HIP: Status: ACTIVE | Noted: 2024-10-24

## 2024-10-24 PROBLEM — D18.01 HEMANGIOMA OF SKIN AND SUBCUTANEOUS TISSUE: Status: ACTIVE | Noted: 2024-10-24

## 2024-10-24 PROBLEM — D22.71 MELANOCYTIC NEVI OF RIGHT LOWER LIMB, INCLUDING HIP: Status: ACTIVE | Noted: 2024-10-24

## 2024-10-24 PROBLEM — D22.5 MELANOCYTIC NEVI OF TRUNK: Status: ACTIVE | Noted: 2024-10-24

## 2024-10-24 PROCEDURE — OTHER ADDITIONAL NOTES: OTHER

## 2024-10-24 PROCEDURE — 17000 DESTRUCT PREMALG LESION: CPT | Mod: 59

## 2024-10-24 PROCEDURE — OTHER PHOTO-DOCUMENTATION: OTHER

## 2024-10-24 PROCEDURE — OTHER SUNSCREEN RECOMMENDATIONS: OTHER

## 2024-10-24 PROCEDURE — OTHER LIQUID NITROGEN: OTHER

## 2024-10-24 PROCEDURE — OTHER BIOPSY BY SHAVE METHOD: OTHER

## 2024-10-24 PROCEDURE — 11102 TANGNTL BX SKIN SINGLE LES: CPT | Mod: 59

## 2024-10-24 PROCEDURE — 17003 DESTRUCT PREMALG LES 2-14: CPT | Mod: 59

## 2024-10-24 PROCEDURE — OTHER COUNSELING: OTHER

## 2024-10-24 PROCEDURE — OTHER MIPS QUALITY: OTHER

## 2024-10-24 PROCEDURE — OTHER PATIENT SPECIFIC COUNSELING: OTHER

## 2024-10-24 PROCEDURE — 17110 DESTRUCT B9 LESION 1-14: CPT

## 2024-10-24 PROCEDURE — 99213 OFFICE O/P EST LOW 20 MIN: CPT | Mod: 25

## 2024-10-24 ASSESSMENT — LOCATION SIMPLE DESCRIPTION DERM
LOCATION SIMPLE: RIGHT ANKLE
LOCATION SIMPLE: CHEST
LOCATION SIMPLE: RIGHT FOREARM
LOCATION SIMPLE: LEFT SHOULDER
LOCATION SIMPLE: LEFT THIGH
LOCATION SIMPLE: LEFT EAR
LOCATION SIMPLE: LEFT LOWER BACK
LOCATION SIMPLE: LEFT UPPER BACK
LOCATION SIMPLE: RIGHT EAR
LOCATION SIMPLE: LEFT UPPER ARM
LOCATION SIMPLE: RIGHT CALF
LOCATION SIMPLE: ABDOMEN
LOCATION SIMPLE: RIGHT FOREHEAD
LOCATION SIMPLE: LEFT PRETIBIAL REGION
LOCATION SIMPLE: UPPER BACK
LOCATION SIMPLE: RIGHT CHEEK
LOCATION SIMPLE: RIGHT THIGH
LOCATION SIMPLE: LEFT CHEEK
LOCATION SIMPLE: LEFT FOREARM
LOCATION SIMPLE: LEFT HAND
LOCATION SIMPLE: POSTERIOR NECK
LOCATION SIMPLE: LEFT SCALP

## 2024-10-24 ASSESSMENT — LOCATION DETAILED DESCRIPTION DERM
LOCATION DETAILED: RIGHT PROXIMAL DORSAL FOREARM
LOCATION DETAILED: LEFT INFERIOR HELIX
LOCATION DETAILED: RIGHT ANTERIOR PROXIMAL THIGH
LOCATION DETAILED: RIGHT POSTERIOR ANKLE
LOCATION DETAILED: RIGHT CENTRAL MALAR CHEEK
LOCATION DETAILED: LEFT INFERIOR LATERAL MIDBACK
LOCATION DETAILED: LEFT DORSAL MIDDLE METACARPOPHALANGEAL JOINT
LOCATION DETAILED: RIGHT SUPERIOR CRUS OF ANTIHELIX
LOCATION DETAILED: RIGHT VENTRAL DISTAL FOREARM
LOCATION DETAILED: MIDDLE STERNUM
LOCATION DETAILED: EPIGASTRIC SKIN
LOCATION DETAILED: LEFT ANTECUBITAL SKIN
LOCATION DETAILED: RIGHT LATERAL MALAR CHEEK
LOCATION DETAILED: LEFT VENTRAL DISTAL FOREARM
LOCATION DETAILED: LEFT MEDIAL UPPER BACK
LOCATION DETAILED: LEFT ANTERIOR DISTAL THIGH
LOCATION DETAILED: RIGHT ANTERIOR DISTAL THIGH
LOCATION DETAILED: LEFT LATERAL INFERIOR CHEST
LOCATION DETAILED: RIGHT FOREHEAD
LOCATION DETAILED: RIGHT VENTRAL PROXIMAL FOREARM
LOCATION DETAILED: LEFT INFERIOR MEDIAL MIDBACK
LOCATION DETAILED: RIGHT LATERAL TRAPEZIAL NECK
LOCATION DETAILED: LEFT DISTAL DORSAL FOREARM
LOCATION DETAILED: LEFT MEDIAL FRONTAL SCALP
LOCATION DETAILED: LEFT PROXIMAL PRETIBIAL REGION
LOCATION DETAILED: LEFT INFERIOR CENTRAL MALAR CHEEK
LOCATION DETAILED: LEFT VENTRAL PROXIMAL FOREARM
LOCATION DETAILED: RIGHT DISTAL CALF
LOCATION DETAILED: LEFT POSTERIOR SHOULDER
LOCATION DETAILED: INFERIOR THORACIC SPINE
LOCATION DETAILED: LEFT ANTERIOR PROXIMAL THIGH

## 2024-10-24 ASSESSMENT — LOCATION ZONE DERM
LOCATION ZONE: ARM
LOCATION ZONE: NECK
LOCATION ZONE: EAR
LOCATION ZONE: HAND
LOCATION ZONE: SCALP
LOCATION ZONE: TRUNK
LOCATION ZONE: LEG
LOCATION ZONE: FACE

## 2024-10-24 NOTE — PROCEDURE: LIQUID NITROGEN
Consent: The patient's consent was obtained including but not limited to risks of crusting, scabbing, blistering, scarring, darker or lighter pigmentary change, recurrence, incomplete removal and infection.
Show Aperture Variable?: Yes
Detail Level: Detailed
Number Of Freeze-Thaw Cycles: 2 freeze-thaw cycles
Render Post-Care Instructions In Note?: no
Duration Of Freeze Thaw-Cycle (Seconds): 6
Post-Care Instructions: I reviewed with the patient in detail post-care instructions. Patient is to wear sunprotection, and avoid picking at any of the treated lesions. Pt may apply Vaseline to crusted or scabbing areas.
Application Tool (Optional): Liquid Nitrogen Sprayer
Duration Of Freeze Thaw-Cycle (Seconds): 5-10
Medical Necessity Information: It is in your best interest to select a reason for this procedure from the list below. All of these items fulfill various CMS LCD requirements except the new and changing color options.
Spray Paint Text: The liquid nitrogen was applied to the skin utilizing a spray paint frosting technique.
Medical Necessity Clause: This procedure was medically necessary because the lesions that were treated were:

## 2024-10-24 NOTE — PROCEDURE: PATIENT SPECIFIC COUNSELING
- Informed people lesions appear to be consistent with precancerous lesions \\n- Recommended freezing with LN2, risk and benefit reviewed
Detail Level: Zone

## 2024-10-24 NOTE — PROCEDURE: BIOPSY BY SHAVE METHOD
Detail Level: Detailed
Depth Of Biopsy: dermis
Was A Bandage Applied: Yes
Size Of Lesion In Cm: 0
Biopsy Type: H and E
Biopsy Method: Personna blade
Anesthesia Type: 1% Xylocaine with 1:200,000 epinephrine and sodium bicarbonate
Anesthesia Volume In Cc: 0.5
Hemostasis: Drysol
Wound Care: Petrolatum
Dressing: bandage
Destruction After The Procedure: No
Type Of Destruction Used: Curettage
Curettage Text: The wound bed was treated with curettage after the biopsy was performed.
Cryotherapy Text: The wound bed was treated with cryotherapy after the biopsy was performed.
Electrodesiccation Text: The wound bed was treated with electrodesiccation after the biopsy was performed.
Electrodesiccation And Curettage Text: The wound bed was treated with electrodesiccation and curettage after the biopsy was performed.
Silver Nitrate Text: The wound bed was treated with silver nitrate after the biopsy was performed.
Lab: -[id number]
Path Notes (To The Dermatopathologist): Please check margins.
Consent: Written consent was obtained and risks were reviewed including but not limited to scarring, infection, bleeding, scabbing, incomplete removal, nerve damage and allergy to anesthesia.
Post-Care Instructions: I reviewed with the patient in detail post-care instructions. Patient is to keep the biopsy site dry overnight, and then apply bacitracin twice daily until healed.
Notification Instructions: Patient will be notified of biopsy results. However, patient instructed to call the office if not contacted within 2 weeks.
Billing Type: Third-Party Bill
Information: Selecting Yes will display possible errors in your note based on the variables you have selected. This validation is only offered as a suggestion for you. PLEASE NOTE THAT THE VALIDATION TEXT WILL BE REMOVED WHEN YOU FINALIZE YOUR NOTE. IF YOU WANT TO FAX A PRELIMINARY NOTE YOU WILL NEED TO TOGGLE THIS TO 'NO' IF YOU DO NOT WANT IT IN YOUR FAXED NOTE.

## 2024-11-07 ENCOUNTER — APPOINTMENT (OUTPATIENT)
Dept: URBAN - METROPOLITAN AREA CLINIC 256 | Age: 74
Setting detail: DERMATOLOGY
End: 2024-11-07

## 2024-11-07 VITALS — HEIGHT: 70 IN | WEIGHT: 190 LBS

## 2024-11-07 PROBLEM — D04.5 CARCINOMA IN SITU OF SKIN OF TRUNK: Status: ACTIVE | Noted: 2024-11-07

## 2024-11-07 PROCEDURE — 17262 DSTRJ MAL LES T/A/L 1.1-2.0: CPT

## 2024-11-07 PROCEDURE — OTHER CURETTAGE AND DESTRUCTION: OTHER

## 2024-11-07 PROCEDURE — OTHER PHOTO-DOCUMENTATION: OTHER

## 2024-11-07 PROCEDURE — OTHER MIPS QUALITY: OTHER

## 2024-11-07 PROCEDURE — OTHER EDUCATIONAL RESOURCES PROVIDED: OTHER

## 2024-11-07 PROCEDURE — OTHER COUNSELING: OTHER

## 2024-11-07 NOTE — PROCEDURE: CURETTAGE AND DESTRUCTION
Size Of Lesion After Curettage: 1.1
Consent was obtained from the patient. The risks, benefits and alternatives to therapy were discussed in detail. Specifically, the risks of infection, scarring, bleeding, prolonged wound healing, nerve injury, incomplete removal, allergy to anesthesia and recurrence were addressed. Alternatives to ED&C, such as: surgical removal and XRT were also discussed.  Prior to the procedure, the treatment site was clearly identified and confirmed by the patient.
Bill As A Line Item Or As Units: Line Item
What Was Performed First?: Curettage
Cautery Type: electrodesiccation
Accession # (Optional): [id number]
Concentration (Mg/Ml Or Millions Of Plaque Forming Units/Cc): 0.01
Size Of Lesion In Cm: 0.9
Add Ability To Document Additional Intralesional Injection: No
Detail Level: Detailed
Final Size Statement: The size of the lesion after curettage was
Total Volume (Ccs): 1
Anesthesia Type: 1% lidocaine with epinephrine
Additional Information: (Optional): The wound was cleaned, and a pressure dressing was applied.  The patient received detailed post-op instructions.
Number Of Curettages: 3
Post-Care Instructions: I reviewed with the patient in detail post-care instructions. Patient is to keep the area dry for 48 hours, and not to engage in any swimming until the area is healed. Should the patient develop any fevers, chills, bleeding, severe pain patient will contact the office immediately.

## 2024-12-08 DIAGNOSIS — E78.5 HYPERLIPIDEMIA LDL GOAL <130: ICD-10-CM

## 2024-12-09 RX ORDER — ATORVASTATIN CALCIUM 10 MG/1
10 TABLET, FILM COATED ORAL DAILY
Qty: 90 TABLET | Refills: 0 | Status: SHIPPED | OUTPATIENT
Start: 2024-12-09

## 2024-12-09 NOTE — TELEPHONE ENCOUNTER
Prescription approved per [facility] Refill Protocol.  Ines Jackson RN  [facility]

## 2025-04-07 SDOH — HEALTH STABILITY: PHYSICAL HEALTH: ON AVERAGE, HOW MANY MINUTES DO YOU ENGAGE IN EXERCISE AT THIS LEVEL?: 10 MIN

## 2025-04-07 SDOH — HEALTH STABILITY: PHYSICAL HEALTH: ON AVERAGE, HOW MANY DAYS PER WEEK DO YOU ENGAGE IN MODERATE TO STRENUOUS EXERCISE (LIKE A BRISK WALK)?: 5 DAYS

## 2025-04-07 ASSESSMENT — SOCIAL DETERMINANTS OF HEALTH (SDOH): HOW OFTEN DO YOU GET TOGETHER WITH FRIENDS OR RELATIVES?: ONCE A WEEK

## 2025-04-10 ENCOUNTER — OFFICE VISIT (OUTPATIENT)
Dept: INTERNAL MEDICINE | Facility: CLINIC | Age: 75
End: 2025-04-10
Payer: COMMERCIAL

## 2025-04-10 VITALS
HEART RATE: 85 BPM | TEMPERATURE: 97.3 F | WEIGHT: 191.4 LBS | RESPIRATION RATE: 16 BRPM | OXYGEN SATURATION: 99 % | HEIGHT: 71 IN | BODY MASS INDEX: 26.8 KG/M2 | DIASTOLIC BLOOD PRESSURE: 105 MMHG | SYSTOLIC BLOOD PRESSURE: 168 MMHG

## 2025-04-10 DIAGNOSIS — I10 ESSENTIAL HYPERTENSION, BENIGN: ICD-10-CM

## 2025-04-10 DIAGNOSIS — Z00.00 ENCOUNTER FOR SUBSEQUENT ANNUAL WELLNESS VISIT IN MEDICARE PATIENT: Primary | ICD-10-CM

## 2025-04-10 DIAGNOSIS — M45.8 ANKYLOSING SPONDYLITIS OF SACRAL REGION (H): ICD-10-CM

## 2025-04-10 DIAGNOSIS — N52.9 ERECTILE DYSFUNCTION, UNSPECIFIED ERECTILE DYSFUNCTION TYPE: ICD-10-CM

## 2025-04-10 DIAGNOSIS — E78.5 HYPERLIPIDEMIA LDL GOAL <130: ICD-10-CM

## 2025-04-10 DIAGNOSIS — Z12.11 COLON CANCER SCREENING: ICD-10-CM

## 2025-04-10 DIAGNOSIS — Z12.5 SCREENING PSA (PROSTATE SPECIFIC ANTIGEN): ICD-10-CM

## 2025-04-10 LAB
CHOLEST SERPL-MCNC: 190 MG/DL
ERYTHROCYTE [DISTWIDTH] IN BLOOD BY AUTOMATED COUNT: 12.7 % (ref 10–15)
FASTING STATUS PATIENT QL REPORTED: YES
HCT VFR BLD AUTO: 42.2 % (ref 40–53)
HDLC SERPL-MCNC: 84 MG/DL
HGB BLD-MCNC: 14.2 G/DL (ref 13.3–17.7)
LDLC SERPL CALC-MCNC: 89 MG/DL
MCH RBC QN AUTO: 31.3 PG (ref 26.5–33)
MCHC RBC AUTO-ENTMCNC: 33.6 G/DL (ref 31.5–36.5)
MCV RBC AUTO: 93 FL (ref 78–100)
NONHDLC SERPL-MCNC: 106 MG/DL
PLATELET # BLD AUTO: 137 10E3/UL (ref 150–450)
PSA SERPL DL<=0.01 NG/ML-MCNC: 4.5 NG/ML (ref 0–6.5)
RBC # BLD AUTO: 4.54 10E6/UL (ref 4.4–5.9)
TRIGL SERPL-MCNC: 87 MG/DL
WBC # BLD AUTO: 6.3 10E3/UL (ref 4–11)

## 2025-04-10 RX ORDER — ATORVASTATIN CALCIUM 10 MG/1
10 TABLET, FILM COATED ORAL DAILY
Qty: 90 TABLET | Refills: 3 | Status: SHIPPED | OUTPATIENT
Start: 2025-04-10

## 2025-04-10 RX ORDER — IRBESARTAN 300 MG/1
300 TABLET ORAL AT BEDTIME
Qty: 90 TABLET | Refills: 1 | Status: SHIPPED | OUTPATIENT
Start: 2025-04-10

## 2025-04-10 RX ORDER — TADALAFIL 20 MG/1
TABLET ORAL
Qty: 30 TABLET | Refills: 5 | Status: SHIPPED | OUTPATIENT
Start: 2025-04-10

## 2025-04-10 RX ORDER — IRBESARTAN 150 MG/1
TABLET ORAL
Qty: 90 TABLET | Refills: 3 | Status: CANCELLED | OUTPATIENT
Start: 2025-04-10

## 2025-04-10 NOTE — PROGRESS NOTES
"Preventive Care Visit  [facility]  Yared Gallardo MD, Internal Medicine  Apr 10, 2025      Assessment & Plan     Encounter for subsequent annual wellness visit in Medicare patient  Advised patient to consider updating routine vaccinations including tetanus.  - Comprehensive metabolic panel; Future  - CBC with platelets; Future  - Lipid Profile; Future    Essential hypertension, benign  Poorly controlled.  Increase Avapro to 300 mg daily, recheck blood pressure in 2 to 4 weeks.  - Comprehensive metabolic panel; Future  - irbesartan (AVAPRO) 300 MG tablet; Take 1 tablet (300 mg) by mouth at bedtime.  - OFFICE/OUTPT VISIT,EST,LEVL IV    HYPERLIPIDEMIA LDL GOAL <130  Labs ordered as fasting per routine.  Continue with statin therapy.  - Lipid Profile; Future  - atorvastatin (LIPITOR) 10 MG tablet; Take 1 tablet (10 mg) by mouth daily.    Ankylosing spondylitis of sacral region (H)  Baseline history and clinically stable    Colon cancer screening  Prior colonoscopy reviewed and suggest annual FIT testing.  - Fecal colorectal cancer screen FIT; Future      Screening PSA (prostate specific antigen)  Routine screening, PSA as ordered  - Prostate Specific Antigen Screen; Future      Erectile dysfunction, unspecified erectile dysfunction type  Refill per patient request.  - tadalafil (CIALIS) 20 MG tablet; TAKE ONE TABLET BY MOUTH DAILY AS NEEDED FOR ERECTILE DYSFUNCTION. MAX 20MG DAILY. DO NOT USE WITH NTG PRODUCTS    Patient has been advised of split billing requirements and indicates understanding: Yes        BMI  Estimated body mass index is 26.69 kg/m  as calculated from the following:    Height as of this encounter: 1.803 m (5' 11\").    Weight as of this encounter: 86.8 kg (191 lb 6.4 oz).   Weight management plan: Discussed healthy diet and exercise guidelines    Counseling  Appropriate preventive services were addressed with this patient via screening, questionnaire, or discussion as appropriate " for fall prevention, nutrition, physical activity, Tobacco-use cessation, social engagement, weight loss and cognition.  Checklist reviewing preventive services available has been given to the patient.  Reviewed patient's diet, addressing concerns and/or questions.       Work on weight loss  Regular exercise    Luis Alberto Lennon is a 74 year old, presenting for the following:  Wellness Visit    HPI:    Annual wellness    Saji states he feels well.  He denies any major complaints.  He does spend his nino down in [place].  He has just returned.  He has been compliant with this therapy.  He does note that after when he went to the dentist his blood pressure was reported to be elevated.     Advance Care Planning  Patient has a Health Care Directive on file    none      4/7/2025   General Health   How would you rate your overall physical health? Excellent   Feel stress (tense, anxious, or unable to sleep) Not at all         4/7/2025   Nutrition   Diet: Regular (no restrictions)         4/7/2025   Exercise   Days per week of moderate/strenous exercise 5 days   Average minutes spent exercising at this level 10 min         4/7/2025   Social Factors   Frequency of gathering with friends or relatives Once a week   Worry food won't last until get money to buy more No   Food not last or not have enough money for food? No   Do you have housing? (Housing is defined as stable permanent housing and does not include staying ouside in a car, in a tent, in an abandoned building, in an overnight shelter, or couch-surfing.) Yes   Are you worried about losing your housing? No   Lack of transportation? No   Unable to get utilities (heat,electricity)? No         4/7/2025   Fall Risk   Fallen 2 or more times in the past year? No   Trouble with walking or balance? No          4/7/2025   Activities of Daily Living- Home Safety   Needs help with the following daily activites None of the above   Safety concerns in the home None of the  above         4/7/2025   Dental   Dentist two times every year? Yes         4/7/2025   Hearing Screening   Hearing concerns? None of the above         4/7/2025   Driving Risk Screening   Patient/family members have concerns about driving No         4/7/2025   General Alertness/Fatigue Screening   Have you been more tired than usual lately? No         4/7/2025   Urinary Incontinence Screening   Bothered by leaking urine in past 6 months No           4/1/2024   TB Screening   Were you born outside of the [place]? No       Today's PHQ-2 Score:       4/10/2025     8:43 AM   PHQ-2 ( 1999 [employer])   Q1: Little interest or pleasure in doing things 0   Q2: Feeling down, depressed or hopeless 0   PHQ-2 Score 0    Q1: Little interest or pleasure in doing things Not at all   Q2: Feeling down, depressed or hopeless Not at all   PHQ-2 Score 0       Patient-reported           4/7/2025   Substance Use   Alcohol more than 3/day or more than 7/wk No   Do you have a current opioid prescription? No   How severe/bad is pain from 1 to 10? 0/10 (No Pain)   Do you use any other substances recreationally? No     Social History     Tobacco Use    Smoking status: Never    Smokeless tobacco: Never   Vaping Use    Vaping status: Never Used   Substance Use Topics    Alcohol use: Yes     Comment: Occasionally.    Drug use: No           4/7/2025   AAA Screening   Family history of Abdominal Aortic Aneurysm (AAA)? No   ASCVD Risk   The 10-year ASCVD risk score (Lazaro BILLS, et al., 2019) is: 37.7%    Values used to calculate the score:      Age: 74 years      Sex: Male      Is Non- : No      Diabetic: No      Tobacco smoker: No      Systolic Blood Pressure: 168 mmHg      Is BP treated: Yes      HDL Cholesterol: 87 mg/dL      Total Cholesterol: 234 mg/dL      Reviewed and updated as needed this visit by Provider                    Lab work is in process  Current providers sharing in care for this patient include:  Patient  "Care Team:  Yared Gallardo MD as PCP - General  Yared Gallardo MD as Assigned PCP    The following health maintenance items are reviewed in Epic and correct as of today:  Health Maintenance   Topic Date Due    ANNUAL REVIEW OF HM ORDERS  03/14/2024    DTAP/TDAP/TD IMMUNIZATION (2 - Td or Tdap) 07/15/2024    BMP  04/08/2025    LIPID  04/08/2025    MEDICARE ANNUAL WELLNESS VISIT  04/08/2025    COVID-19 Vaccine (9 - 2024-25 season) 04/30/2025    PSA  04/08/2026    FALL RISK ASSESSMENT  04/10/2026    DIABETES SCREENING  04/08/2027    ADVANCE CARE PLANNING  04/08/2029    HEPATITIS C SCREENING  Completed    PHQ-2 (once per calendar year)  Completed    INFLUENZA VACCINE  Completed    Pneumococcal Vaccine: 50+ Years  Completed    ZOSTER IMMUNIZATION  Completed    RSV VACCINE  Completed    HPV IMMUNIZATION  Aged Out    MENINGITIS IMMUNIZATION  Aged Out    COLORECTAL CANCER SCREENING  Discontinued         Review of Systems  CONSTITUTIONAL: NEGATIVE for fever, chills, change in weight  EYES: NEGATIVE for vision changes or irritation  ENT/MOUTH: NEGATIVE for ear, mouth and throat problems  RESP: NEGATIVE for significant cough or SOB  CV: NEGATIVE for chest pain, palpitations or peripheral edema  GI: NEGATIVE for nausea, abdominal pain, heartburn, or change in bowel habits  : NEGATIVE for frequency, dysuria, or hematuria  MUSCULOSKELETAL: NEGATIVE for significant arthralgias or myalgia  NEURO: NEGATIVE for weakness, dizziness or paresthesias  ENDOCRINE: NEGATIVE for temperature intolerance, skin/hair changes  HEME: NEGATIVE for bleeding problems  PSYCHIATRIC: NEGATIVE for changes in mood or affect     Objective    Exam  BP (!) 168/105   Pulse 85   Temp 97.3  F (36.3  C) (Temporal)   Resp 16   Ht 1.803 m (5' 11\")   Wt 86.8 kg (191 lb 6.4 oz)   SpO2 99%   BMI 26.69 kg/m     Estimated body mass index is 26.69 kg/m  as calculated from the following:    Height as of this encounter: 1.803 m (5' 11\").    Weight as of this " encounter: 86.8 kg (191 lb 6.4 oz).    Physical Exam:    GENERAL: alert and no distress  EYES: Eyes grossly normal to inspection, PERRL and conjunctivae and sclerae normal  HENT: ear canals and TM's normal, nose and mouth without ulcers or lesions  NECK: no adenopathy, no asymmetry, masses, or scars  RESP: lungs clear to auscultation - no rales, rhonchi or wheezes  CV: regular rate and rhythm, normal S1 S2, no S3 or S4, no murmur, click or rub, no peripheral edema  ABDOMEN: soft, nontender, no hepatosplenomegaly, no masses and bowel sounds normal  NEURO: No focal changes  PSYCH: mentation appears normal, affect normal/bright        4/10/2025   Mini Cog   Clock Draw Score 2 Normal   3 Item Recall 3 objects recalled   Mini Cog Total Score 5          Signed Electronically by: Yared Gallardo MD

## 2025-04-10 NOTE — PATIENT INSTRUCTIONS
Patient Education   Preventive Care Advice   This is general advice given by our system to help you stay healthy. However, your care team may have specific advice just for you. Please talk to your care team about your preventive care needs.  Nutrition  Eat 5 or more servings of fruits and vegetables each day.  Try wheat bread, brown rice and whole grain pasta (instead of white bread, rice, and pasta).  Get enough calcium and vitamin D. Check the label on foods and aim for 100% of the RDA (recommended daily allowance).  Lifestyle  Exercise at least 150 minutes each week  (30 minutes a day, 5 days a week).  Do muscle strengthening activities 2 days a week. These help control your weight and prevent disease.  No smoking.  Wear sunscreen to prevent skin cancer.  Have a dental exam and cleaning every 6 months.  Yearly exams  See your health care team every year to talk about:  Any changes in your health.  Any medicines your care team has prescribed.  Preventive care, family planning, and ways to prevent chronic diseases.  Shots (vaccines)   HPV shots (up to age 26), if you've never had them before.  Hepatitis B shots (up to age 59), if you've never had them before.  COVID-19 shot: Get this shot when it's due.  Flu shot: Get a flu shot every year.  Tetanus shot: Get a tetanus shot every 10 years.  Pneumococcal, hepatitis A, and RSV shots: Ask your care team if you need these based on your risk.  Shingles shot (for age 50 and up)  General health tests  Diabetes screening:  Starting at age 35, Get screened for diabetes at least every 3 years.  If you are younger than age 35, ask your care team if you should be screened for diabetes.  Cholesterol test: At age 39, start having a cholesterol test every 5 years, or more often if advised.  Bone density scan (DEXA): At age 50, ask your care team if you should have this scan for osteoporosis (brittle bones).  Hepatitis C: Get tested at least once in your life.  STIs (sexually  transmitted infections)  Before age 24: Ask your care team if you should be screened for STIs.  After age 24: Get screened for STIs if you're at risk. You are at risk for STIs (including HIV) if:  You are sexually active with more than one person.  You don't use condoms every time.  You or a partner was diagnosed with a sexually transmitted infection.  If you are at risk for HIV, ask about PrEP medicine to prevent HIV.  Get tested for HIV at least once in your life, whether you are at risk for HIV or not.  Cancer screening tests  Cervical cancer screening: If you have a cervix, begin getting regular cervical cancer screening tests starting at age 21.  Breast cancer scan (mammogram): If you've ever had breasts, begin having regular mammograms starting at age 40. This is a scan to check for breast cancer.  Colon cancer screening: It is important to start screening for colon cancer at age 45.  Have a colonoscopy test every 10 years (or more often if you're at risk) Or, ask your provider about stool tests like a FIT test every year or Cologuard test every 3 years.  To learn more about your testing options, visit:   .  For help making a decision, visit:   https://bit.ly/ba48470.  Prostate cancer screening test: If you have a prostate, ask your care team if a prostate cancer screening test (PSA) at age 55 is right for you.  Lung cancer screening: If you are a current or former smoker ages 50 to 80, ask your care team if ongoing lung cancer screenings are right for you.  For informational purposes only. Not to replace the advice of your health care provider. Copyright   2023 [employer]. All rights reserved. Clinically reviewed by the [facility] Transitions Program. [employer] [id number] - REV 01/24.

## 2025-04-15 LAB — HEMOCCULT STL QL IA: NEGATIVE

## 2025-04-25 ENCOUNTER — APPOINTMENT (OUTPATIENT)
Dept: URBAN - METROPOLITAN AREA CLINIC 256 | Age: 75
Setting detail: DERMATOLOGY
End: 2025-04-25

## 2025-04-25 VITALS — HEIGHT: 71 IN | WEIGHT: 191 LBS

## 2025-04-25 DIAGNOSIS — D18.0 HEMANGIOMA: ICD-10-CM

## 2025-04-25 DIAGNOSIS — Z85.828 PERSONAL HISTORY OF OTHER MALIGNANT NEOPLASM OF SKIN: ICD-10-CM

## 2025-04-25 DIAGNOSIS — Z71.89 OTHER SPECIFIED COUNSELING: ICD-10-CM

## 2025-04-25 DIAGNOSIS — Z86.007 PERSONAL HISTORY OF IN-SITU NEOPLASM OF SKIN: ICD-10-CM

## 2025-04-25 DIAGNOSIS — D22 MELANOCYTIC NEVI: ICD-10-CM

## 2025-04-25 DIAGNOSIS — L82.1 OTHER SEBORRHEIC KERATOSIS: ICD-10-CM

## 2025-04-25 DIAGNOSIS — Z80.8 FAMILY HISTORY OF MALIGNANT NEOPLASM OF OTHER ORGANS OR SYSTEMS: ICD-10-CM

## 2025-04-25 DIAGNOSIS — L57.8 OTHER SKIN CHANGES DUE TO CHRONIC EXPOSURE TO NONIONIZING RADIATION: ICD-10-CM

## 2025-04-25 DIAGNOSIS — L57.0 ACTINIC KERATOSIS: ICD-10-CM

## 2025-04-25 DIAGNOSIS — L71.1 RHINOPHYMA: ICD-10-CM

## 2025-04-25 DIAGNOSIS — L82.0 INFLAMED SEBORRHEIC KERATOSIS: ICD-10-CM

## 2025-04-25 PROBLEM — D22.5 MELANOCYTIC NEVI OF TRUNK: Status: ACTIVE | Noted: 2025-04-25

## 2025-04-25 PROBLEM — D18.01 HEMANGIOMA OF SKIN AND SUBCUTANEOUS TISSUE: Status: ACTIVE | Noted: 2025-04-25

## 2025-04-25 PROBLEM — D22.62 MELANOCYTIC NEVI OF LEFT UPPER LIMB, INCLUDING SHOULDER: Status: ACTIVE | Noted: 2025-04-25

## 2025-04-25 PROBLEM — D22.61 MELANOCYTIC NEVI OF RIGHT UPPER LIMB, INCLUDING SHOULDER: Status: ACTIVE | Noted: 2025-04-25

## 2025-04-25 PROBLEM — D22.71 MELANOCYTIC NEVI OF RIGHT LOWER LIMB, INCLUDING HIP: Status: ACTIVE | Noted: 2025-04-25

## 2025-04-25 PROBLEM — D22.72 MELANOCYTIC NEVI OF LEFT LOWER LIMB, INCLUDING HIP: Status: ACTIVE | Noted: 2025-04-25

## 2025-04-25 PROCEDURE — OTHER ADDITIONAL NOTES: OTHER

## 2025-04-25 PROCEDURE — OTHER LIQUID NITROGEN: OTHER

## 2025-04-25 PROCEDURE — OTHER PATIENT SPECIFIC COUNSELING: OTHER

## 2025-04-25 PROCEDURE — 17003 DESTRUCT PREMALG LES 2-14: CPT | Mod: 59

## 2025-04-25 PROCEDURE — OTHER COUNSELING: OTHER

## 2025-04-25 PROCEDURE — 17110 DESTRUCT B9 LESION 1-14: CPT

## 2025-04-25 PROCEDURE — 99213 OFFICE O/P EST LOW 20 MIN: CPT | Mod: 25

## 2025-04-25 PROCEDURE — 17000 DESTRUCT PREMALG LESION: CPT | Mod: 59

## 2025-04-25 PROCEDURE — OTHER MIPS QUALITY: OTHER

## 2025-04-25 ASSESSMENT — LOCATION DETAILED DESCRIPTION DERM
LOCATION DETAILED: RIGHT VENTRAL PROXIMAL FOREARM
LOCATION DETAILED: RIGHT PROXIMAL DORSAL FOREARM
LOCATION DETAILED: RIGHT MID TEMPLE
LOCATION DETAILED: LEFT CENTRAL MALAR CHEEK
LOCATION DETAILED: RIGHT SUPERIOR CENTRAL MALAR CHEEK
LOCATION DETAILED: LEFT ANTERIOR DISTAL THIGH
LOCATION DETAILED: RIGHT DISTAL DORSAL FOREARM
LOCATION DETAILED: LEFT VENTRAL DISTAL FOREARM
LOCATION DETAILED: EPIGASTRIC SKIN
LOCATION DETAILED: LEFT DISTAL DORSAL FOREARM
LOCATION DETAILED: LEFT INFERIOR LATERAL MIDBACK
LOCATION DETAILED: RIGHT ANTERIOR EARLOBE
LOCATION DETAILED: LEFT INFERIOR CENTRAL MALAR CHEEK
LOCATION DETAILED: RIGHT DISTAL PRETIBIAL REGION
LOCATION DETAILED: LEFT INFERIOR MEDIAL MIDBACK
LOCATION DETAILED: NASAL TIP
LOCATION DETAILED: LEFT ANTECUBITAL SKIN
LOCATION DETAILED: LEFT VENTRAL PROXIMAL FOREARM
LOCATION DETAILED: LEFT CENTRAL FRONTAL SCALP
LOCATION DETAILED: LEFT LATERAL INFERIOR CHEST
LOCATION DETAILED: RIGHT ANTERIOR DISTAL THIGH
LOCATION DETAILED: RIGHT INFERIOR HELIX
LOCATION DETAILED: RIGHT ANTERIOR SHOULDER
LOCATION DETAILED: MIDDLE STERNUM
LOCATION DETAILED: LEFT MEDIAL UPPER BACK
LOCATION DETAILED: RIGHT ANTERIOR PROXIMAL THIGH
LOCATION DETAILED: RIGHT ANTITRAGUS
LOCATION DETAILED: LEFT ANTERIOR PROXIMAL THIGH
LOCATION DETAILED: INFERIOR THORACIC SPINE
LOCATION DETAILED: RIGHT VENTRAL DISTAL FOREARM

## 2025-04-25 ASSESSMENT — LOCATION SIMPLE DESCRIPTION DERM
LOCATION SIMPLE: RIGHT TEMPLE
LOCATION SIMPLE: ABDOMEN
LOCATION SIMPLE: NOSE
LOCATION SIMPLE: LEFT FOREARM
LOCATION SIMPLE: LEFT LOWER BACK
LOCATION SIMPLE: CHEST
LOCATION SIMPLE: RIGHT CHEEK
LOCATION SIMPLE: RIGHT THIGH
LOCATION SIMPLE: RIGHT EAR
LOCATION SIMPLE: RIGHT PRETIBIAL REGION
LOCATION SIMPLE: UPPER BACK
LOCATION SIMPLE: LEFT UPPER BACK
LOCATION SIMPLE: RIGHT FOREARM
LOCATION SIMPLE: RIGHT SHOULDER
LOCATION SIMPLE: LEFT SCALP
LOCATION SIMPLE: LEFT THIGH
LOCATION SIMPLE: LEFT CHEEK
LOCATION SIMPLE: LEFT UPPER ARM

## 2025-04-25 ASSESSMENT — LOCATION ZONE DERM
LOCATION ZONE: LEG
LOCATION ZONE: ARM
LOCATION ZONE: SCALP
LOCATION ZONE: TRUNK
LOCATION ZONE: FACE
LOCATION ZONE: NOSE
LOCATION ZONE: EAR

## 2025-04-25 NOTE — HPI: SECONDARY COMPLAINT
Additional History: The patient mentioned being in [place], so he wasn’t sure if he got sunburned out there.

## 2025-04-25 NOTE — PROCEDURE: PATIENT SPECIFIC COUNSELING
- Informed people lesions appear to be consistent with precancerous lesions \\n- Recommended freezing with LN2, risk and benefit reviewed
Detail Level: Zone
- Informed patient symptoms are due to rosacea\\n- Informed patient treatment is no necessary if not bothersome but if he wanted treatment, surgery would be required\\n- Patient expressed understanding

## 2025-04-25 NOTE — PROCEDURE: LIQUID NITROGEN
Consent: The patient's consent was obtained including but not limited to risks of crusting, scabbing, blistering, scarring, darker or lighter pigmentary change, recurrence, incomplete removal and infection.
Show Aperture Variable?: Yes
Detail Level: Detailed
Number Of Freeze-Thaw Cycles: 2 freeze-thaw cycles
Render Post-Care Instructions In Note?: no
Duration Of Freeze Thaw-Cycle (Seconds): 6
Post-Care Instructions: I reviewed with the patient in detail post-care instructions. Patient is to wear sunprotection, and avoid picking at any of the treated lesions. Pt may apply Vaseline to crusted or scabbing areas.
Application Tool (Optional): Liquid Nitrogen Sprayer
Medical Necessity Clause: This procedure was medically necessary because the lesions that were treated were:
Spray Paint Text: The liquid nitrogen was applied to the skin utilizing a spray paint frosting technique.
Medical Necessity Information: It is in your best interest to select a reason for this procedure from the list below. All of these items fulfill various CMS LCD requirements except the new and changing color options.
Duration Of Freeze Thaw-Cycle (Seconds): 5-10

## 2025-05-29 ENCOUNTER — MYC MEDICAL ADVICE (OUTPATIENT)
Dept: INTERNAL MEDICINE | Facility: CLINIC | Age: 75
End: 2025-05-29
Payer: COMMERCIAL

## 2025-05-29 NOTE — LETTER
May 29, 2025    Saji Morrison  [address] [place] [address]      To Whom It May Concern,    In general there is No role for antibiotic prophylaxis prior to dental procedures - Based on available data and consistent with expert guidelines from both the American Dental Association (ADA) and the American Academy of Orthopedic Surgeons (AAOS), we usually advise against the routine use of antibiotic prophylaxis in patients with prosthetic joints undergoing dental procedures. Patients who have another indication for pre-dental-procedure prophylaxis (such as heart valve disease requiring endocarditis prophylaxis or a surgical procedure for which antibiotics are given to prevent a surgical site infection) should receive antibiotics.       Please call my office if you have any questions or concerns.      Sincerely,        Yared Gallardo MD          Electronically signed

## 2025-06-23 ENCOUNTER — ANCILLARY PROCEDURE (OUTPATIENT)
Dept: GENERAL RADIOLOGY | Facility: CLINIC | Age: 75
End: 2025-06-23
Attending: FAMILY MEDICINE
Payer: COMMERCIAL

## 2025-06-23 ENCOUNTER — OFFICE VISIT (OUTPATIENT)
Dept: URGENT CARE | Facility: URGENT CARE | Age: 75
End: 2025-06-23
Payer: COMMERCIAL

## 2025-06-23 VITALS
SYSTOLIC BLOOD PRESSURE: 162 MMHG | WEIGHT: 188 LBS | BODY MASS INDEX: 26.92 KG/M2 | DIASTOLIC BLOOD PRESSURE: 112 MMHG | RESPIRATION RATE: 20 BRPM | TEMPERATURE: 96.8 F | HEART RATE: 103 BPM | HEIGHT: 70 IN

## 2025-06-23 DIAGNOSIS — S99.922A TOE INJURY, LEFT, INITIAL ENCOUNTER: Primary | ICD-10-CM

## 2025-06-23 DIAGNOSIS — S92.525A CLOSED NONDISPLACED FRACTURE OF MIDDLE PHALANX OF LESSER TOE OF LEFT FOOT, INITIAL ENCOUNTER: ICD-10-CM

## 2025-06-23 PROCEDURE — 73660 X-RAY EXAM OF TOE(S): CPT | Mod: TC | Performed by: RADIOLOGY

## 2025-06-23 PROCEDURE — 3080F DIAST BP >= 90 MM HG: CPT | Performed by: FAMILY MEDICINE

## 2025-06-23 PROCEDURE — 3077F SYST BP >= 140 MM HG: CPT | Performed by: FAMILY MEDICINE

## 2025-06-23 PROCEDURE — 99213 OFFICE O/P EST LOW 20 MIN: CPT | Performed by: FAMILY MEDICINE

## 2025-06-23 NOTE — PROGRESS NOTES
Urgent Care Clinic Visit    Chief Complaint   Patient presents with    Urgent Care     Pt states that his L pink toe got injury on Tuesday a piece of wood fell on the pink toe  pt said that its hurting and its red.              6/23/2025    12:26 PM   Additional Questions   Roomed by layla   Accompanied by self

## 2025-06-23 NOTE — PROGRESS NOTES
"SUBJECTIVE:  Chief Complaint   Patient presents with    Urgent Care     Pt states that his L pink toe got injury on Tuesday a piece of wood fell on the pink toe  pt said that its hurting and its red.   .ident presents with a chief complaint of left toe(s) fifth.  The injury occurred 6 days ago.   The injury happened while at home.   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       ROSINTEGUMENTARY/SKIN: NEGATIVE for open wound/bleeding and NEGATIVE for bruising    EXAM: BP (!) 162/112   Pulse 103   Temp 96.8  F (36  C) (Tympanic)   Resp 20   Ht 1.77 m (5' 9.69\")   Wt 85.3 kg (188 lb)   BMI 27.22 kg/m  Gen: healthy,alert,no distress  Extremity: toe has pain with palpation.   There is not compromise to the distal circulation.  Pulses are +2 and CRT is brisk.  EXTREMITIES: peripheral pulses normal  SKIN: no suspicious lesions or rashes  NEURO: Normal strength and tone, sensory exam grossly normal, mentation intact and speech normal    X-RAY with mid toe fx, non displaced, read by Dr. Yared Jose      ICD-10-CM    1. Toe injury, left, initial encounter  S99.922A XR Toe Left G/E 2 Views      2. Closed nondisplaced fracture of middle phalanx of lesser toe of left foot, initial encounter  S92.525A         Hard sole shoe  Julian tape  RICE    "

## 2025-07-20 ENCOUNTER — NURSE TRIAGE (OUTPATIENT)
Dept: INTERNAL MEDICINE | Facility: CLINIC | Age: 75
End: 2025-07-20
Payer: COMMERCIAL

## 2025-07-21 ENCOUNTER — APPOINTMENT (OUTPATIENT)
Dept: MRI IMAGING | Facility: CLINIC | Age: 75
End: 2025-07-21
Attending: STUDENT IN AN ORGANIZED HEALTH CARE EDUCATION/TRAINING PROGRAM
Payer: COMMERCIAL

## 2025-07-21 ENCOUNTER — APPOINTMENT (OUTPATIENT)
Dept: CT IMAGING | Facility: CLINIC | Age: 75
End: 2025-07-21
Attending: EMERGENCY MEDICINE
Payer: COMMERCIAL

## 2025-07-21 ENCOUNTER — HOSPITAL ENCOUNTER (INPATIENT)
Facility: CLINIC | Age: 75
LOS: 2 days | Discharge: HOME | End: 2025-07-23
Attending: EMERGENCY MEDICINE | Admitting: STUDENT IN AN ORGANIZED HEALTH CARE EDUCATION/TRAINING PROGRAM
Payer: COMMERCIAL

## 2025-07-21 DIAGNOSIS — G45.9 TIA (TRANSIENT ISCHEMIC ATTACK): ICD-10-CM

## 2025-07-21 DIAGNOSIS — Z71.89 OTHER SPECIFIED COUNSELING: Chronic | ICD-10-CM

## 2025-07-21 DIAGNOSIS — I65.01 STENOSIS OF RIGHT VERTEBRAL ARTERY: ICD-10-CM

## 2025-07-21 DIAGNOSIS — I48.91 NEW ONSET ATRIAL FIBRILLATION (H): ICD-10-CM

## 2025-07-21 DIAGNOSIS — I10 HYPERTENSION, UNSPECIFIED TYPE: ICD-10-CM

## 2025-07-21 DIAGNOSIS — I63.9 CEREBROVASCULAR ACCIDENT (CVA), UNSPECIFIED MECHANISM (H): Primary | ICD-10-CM

## 2025-07-21 PROBLEM — Z79.899 HIGH RISK MEDICATION USE: Status: ACTIVE | Noted: 2018-08-29

## 2025-07-21 PROBLEM — M54.9 CHRONIC BACK PAIN: Status: ACTIVE | Noted: 2019-02-21

## 2025-07-21 PROBLEM — M16.0 PRIMARY OSTEOARTHRITIS OF BOTH HIPS: Status: ACTIVE | Noted: 2018-08-29

## 2025-07-21 PROBLEM — M25.551 PAIN OF RIGHT HIP JOINT: Status: ACTIVE | Noted: 2019-02-21

## 2025-07-21 PROBLEM — G89.29 CHRONIC BACK PAIN: Status: ACTIVE | Noted: 2019-02-21

## 2025-07-21 PROBLEM — M19.90 OSTEOARTHROSIS: Status: ACTIVE | Noted: 2019-02-21

## 2025-07-21 PROBLEM — M45.8 ANKYLOSING SPONDYLITIS OF SACRAL REGION (H): Status: ACTIVE | Noted: 2019-09-04

## 2025-07-21 LAB
ALBUMIN SERPL BCG-MCNC: 4.3 G/DL (ref 3.5–5.2)
ALBUMIN UR-MCNC: 10 MG/DL
ALP SERPL-CCNC: 126 U/L (ref 40–150)
ALT SERPL W P-5'-P-CCNC: 18 U/L (ref 0–70)
ANION GAP SERPL CALCULATED.3IONS-SCNC: 10 MMOL/L (ref 7–15)
APPEARANCE UR: CLEAR
AST SERPL W P-5'-P-CCNC: 21 U/L (ref 0–45)
ATRIAL RATE - MUSE: NORMAL BPM
BASOPHILS # BLD AUTO: 0 10E3/UL (ref 0–0.2)
BASOPHILS NFR BLD AUTO: 0 %
BILIRUB SERPL-MCNC: 0.8 MG/DL
BILIRUB UR QL STRIP: NEGATIVE
BUN SERPL-MCNC: 19.7 MG/DL (ref 8–23)
CALCIUM SERPL-MCNC: 9.5 MG/DL (ref 8.8–10.4)
CHLORIDE SERPL-SCNC: 106 MMOL/L (ref 98–107)
COLOR UR AUTO: ABNORMAL
CREAT SERPL-MCNC: 0.88 MG/DL (ref 0.67–1.17)
DIASTOLIC BLOOD PRESSURE - MUSE: NORMAL MMHG
EGFRCR SERPLBLD CKD-EPI 2021: 89 ML/MIN/1.73M2
EOSINOPHIL # BLD AUTO: 0 10E3/UL (ref 0–0.7)
EOSINOPHIL NFR BLD AUTO: 0 %
ERYTHROCYTE [DISTWIDTH] IN BLOOD BY AUTOMATED COUNT: 12.9 % (ref 10–15)
GLUCOSE BLDC GLUCOMTR-MCNC: 144 MG/DL (ref 70–99)
GLUCOSE SERPL-MCNC: 102 MG/DL (ref 70–99)
GLUCOSE UR STRIP-MCNC: NEGATIVE MG/DL
HCO3 SERPL-SCNC: 24 MMOL/L (ref 22–29)
HCT VFR BLD AUTO: 41.1 % (ref 40–53)
HGB BLD-MCNC: 14.1 G/DL (ref 13.3–17.7)
HGB UR QL STRIP: NEGATIVE
HOLD SPECIMEN: NORMAL
IMM GRANULOCYTES # BLD: 0 10E3/UL
IMM GRANULOCYTES NFR BLD: 0 %
INTERPRETATION ECG - MUSE: NORMAL
KETONES UR STRIP-MCNC: NEGATIVE MG/DL
LEUKOCYTE ESTERASE UR QL STRIP: NEGATIVE
LYMPHOCYTES # BLD AUTO: 2 10E3/UL (ref 0.8–5.3)
LYMPHOCYTES NFR BLD AUTO: 30 %
MCH RBC QN AUTO: 31.9 PG (ref 26.5–33)
MCHC RBC AUTO-ENTMCNC: 34.3 G/DL (ref 31.5–36.5)
MCV RBC AUTO: 93 FL (ref 78–100)
MONOCYTES # BLD AUTO: 0.5 10E3/UL (ref 0–1.3)
MONOCYTES NFR BLD AUTO: 8 %
NEUTROPHILS # BLD AUTO: 4.1 10E3/UL (ref 1.6–8.3)
NEUTROPHILS NFR BLD AUTO: 61 %
NITRATE UR QL: NEGATIVE
NRBC # BLD AUTO: 0 10E3/UL
NRBC BLD AUTO-RTO: 0 /100
P AXIS - MUSE: NORMAL DEGREES
PH UR STRIP: 7 [PH] (ref 5–7)
PLATELET # BLD AUTO: 150 10E3/UL (ref 150–450)
POTASSIUM SERPL-SCNC: 4.4 MMOL/L (ref 3.4–5.3)
PR INTERVAL - MUSE: NORMAL MS
PROT SERPL-MCNC: 7.4 G/DL (ref 6.4–8.3)
QRS DURATION - MUSE: 88 MS
QT - MUSE: 332 MS
QTC - MUSE: 419 MS
R AXIS - MUSE: 14 DEGREES
RBC # BLD AUTO: 4.42 10E6/UL (ref 4.4–5.9)
RBC URINE: 1 /HPF
SODIUM SERPL-SCNC: 140 MMOL/L (ref 135–145)
SP GR UR STRIP: 1.03 (ref 1–1.03)
SYSTOLIC BLOOD PRESSURE - MUSE: NORMAL MMHG
T AXIS - MUSE: 61 DEGREES
UROBILINOGEN UR STRIP-MCNC: NORMAL MG/DL
VENTRICULAR RATE- MUSE: 96 BPM
WBC # BLD AUTO: 6.7 10E3/UL (ref 4–11)
WBC URINE: <1 /HPF

## 2025-07-21 PROCEDURE — 99285 EMERGENCY DEPT VISIT HI MDM: CPT | Mod: 25

## 2025-07-21 PROCEDURE — 99223 1ST HOSP IP/OBS HIGH 75: CPT | Performed by: STUDENT IN AN ORGANIZED HEALTH CARE EDUCATION/TRAINING PROGRAM

## 2025-07-21 PROCEDURE — 82465 ASSAY BLD/SERUM CHOLESTEROL: CPT | Performed by: STUDENT IN AN ORGANIZED HEALTH CARE EDUCATION/TRAINING PROGRAM

## 2025-07-21 PROCEDURE — 255N000002 HC RX 255 OP 636: Performed by: STUDENT IN AN ORGANIZED HEALTH CARE EDUCATION/TRAINING PROGRAM

## 2025-07-21 PROCEDURE — 83036 HEMOGLOBIN GLYCOSYLATED A1C: CPT | Performed by: STUDENT IN AN ORGANIZED HEALTH CARE EDUCATION/TRAINING PROGRAM

## 2025-07-21 PROCEDURE — 99222 1ST HOSP IP/OBS MODERATE 55: CPT

## 2025-07-21 PROCEDURE — 85048 AUTOMATED LEUKOCYTE COUNT: CPT | Performed by: EMERGENCY MEDICINE

## 2025-07-21 PROCEDURE — A9585 GADOBUTROL INJECTION: HCPCS | Performed by: STUDENT IN AN ORGANIZED HEALTH CARE EDUCATION/TRAINING PROGRAM

## 2025-07-21 PROCEDURE — 93005 ELECTROCARDIOGRAM TRACING: CPT

## 2025-07-21 PROCEDURE — 81001 URINALYSIS AUTO W/SCOPE: CPT | Performed by: STUDENT IN AN ORGANIZED HEALTH CARE EDUCATION/TRAINING PROGRAM

## 2025-07-21 PROCEDURE — 250N000009 HC RX 250: Performed by: EMERGENCY MEDICINE

## 2025-07-21 PROCEDURE — 70553 MRI BRAIN STEM W/O & W/DYE: CPT

## 2025-07-21 PROCEDURE — 70498 CT ANGIOGRAPHY NECK: CPT

## 2025-07-21 PROCEDURE — 82247 BILIRUBIN TOTAL: CPT | Performed by: EMERGENCY MEDICINE

## 2025-07-21 PROCEDURE — 120N000001 HC R&B MED SURG/OB

## 2025-07-21 PROCEDURE — 99285 EMERGENCY DEPT VISIT HI MDM: CPT | Mod: 25 | Performed by: EMERGENCY MEDICINE

## 2025-07-21 PROCEDURE — 250N000011 HC RX IP 250 OP 636: Performed by: EMERGENCY MEDICINE

## 2025-07-21 PROCEDURE — 250N000013 HC RX MED GY IP 250 OP 250 PS 637: Performed by: STUDENT IN AN ORGANIZED HEALTH CARE EDUCATION/TRAINING PROGRAM

## 2025-07-21 PROCEDURE — 70450 CT HEAD/BRAIN W/O DYE: CPT

## 2025-07-21 PROCEDURE — 85025 COMPLETE CBC W/AUTO DIFF WBC: CPT | Performed by: EMERGENCY MEDICINE

## 2025-07-21 PROCEDURE — 36415 COLL VENOUS BLD VENIPUNCTURE: CPT | Performed by: EMERGENCY MEDICINE

## 2025-07-21 PROCEDURE — 80053 COMPREHEN METABOLIC PANEL: CPT | Performed by: EMERGENCY MEDICINE

## 2025-07-21 RX ORDER — ONDANSETRON 2 MG/ML
4 INJECTION INTRAMUSCULAR; INTRAVENOUS EVERY 6 HOURS PRN
Status: DISCONTINUED | OUTPATIENT
Start: 2025-07-21 | End: 2025-07-23 | Stop reason: HOSPADM

## 2025-07-21 RX ORDER — ONDANSETRON 4 MG/1
4 TABLET, ORALLY DISINTEGRATING ORAL EVERY 6 HOURS PRN
Status: DISCONTINUED | OUTPATIENT
Start: 2025-07-21 | End: 2025-07-23 | Stop reason: HOSPADM

## 2025-07-21 RX ORDER — GADOBUTROL 604.72 MG/ML
9 INJECTION INTRAVENOUS ONCE
Status: COMPLETED | OUTPATIENT
Start: 2025-07-21 | End: 2025-07-21

## 2025-07-21 RX ORDER — ASPIRIN 325 MG
325 TABLET ORAL ONCE
Status: COMPLETED | OUTPATIENT
Start: 2025-07-21 | End: 2025-07-21

## 2025-07-21 RX ORDER — LIDOCAINE 40 MG/G
CREAM TOPICAL
Status: DISCONTINUED | OUTPATIENT
Start: 2025-07-21 | End: 2025-07-23 | Stop reason: HOSPADM

## 2025-07-21 RX ORDER — ASPIRIN 81 MG/1
81 TABLET ORAL DAILY
Status: DISCONTINUED | OUTPATIENT
Start: 2025-07-22 | End: 2025-07-22

## 2025-07-21 RX ORDER — IBUPROFEN 200 MG
200 TABLET ORAL DAILY
COMMUNITY
End: 2025-07-29

## 2025-07-21 RX ORDER — IOPAMIDOL 755 MG/ML
67 INJECTION, SOLUTION INTRAVASCULAR ONCE
Status: COMPLETED | OUTPATIENT
Start: 2025-07-21 | End: 2025-07-21

## 2025-07-21 RX ORDER — METOPROLOL TARTRATE 1 MG/ML
5 INJECTION, SOLUTION INTRAVENOUS EVERY 4 HOURS PRN
Status: DISCONTINUED | OUTPATIENT
Start: 2025-07-21 | End: 2025-07-22

## 2025-07-21 RX ORDER — ACETAMINOPHEN 500 MG
1000 TABLET ORAL EVERY 6 HOURS PRN
Status: DISCONTINUED | OUTPATIENT
Start: 2025-07-21 | End: 2025-07-23 | Stop reason: HOSPADM

## 2025-07-21 RX ORDER — ATORVASTATIN CALCIUM 10 MG/1
10 TABLET, FILM COATED ORAL DAILY
Status: DISCONTINUED | OUTPATIENT
Start: 2025-07-22 | End: 2025-07-23 | Stop reason: HOSPADM

## 2025-07-21 RX ORDER — ASPIRIN 81 MG/1
81 TABLET, CHEWABLE ORAL DAILY
Status: DISCONTINUED | OUTPATIENT
Start: 2025-07-22 | End: 2025-07-22

## 2025-07-21 RX ORDER — HYDRALAZINE HYDROCHLORIDE 20 MG/ML
10 INJECTION INTRAMUSCULAR; INTRAVENOUS EVERY 6 HOURS PRN
Status: DISCONTINUED | OUTPATIENT
Start: 2025-07-21 | End: 2025-07-22

## 2025-07-21 RX ORDER — PROCHLORPERAZINE MALEATE 5 MG/1
5 TABLET ORAL EVERY 6 HOURS PRN
Status: DISCONTINUED | OUTPATIENT
Start: 2025-07-21 | End: 2025-07-23 | Stop reason: HOSPADM

## 2025-07-21 RX ADMIN — SODIUM CHLORIDE 90 ML: 9 INJECTION, SOLUTION INTRAVENOUS at 14:45

## 2025-07-21 RX ADMIN — IOPAMIDOL 67 ML: 755 INJECTION, SOLUTION INTRAVENOUS at 14:45

## 2025-07-21 RX ADMIN — GADOBUTROL 9 ML: 604.72 INJECTION INTRAVENOUS at 22:22

## 2025-07-21 RX ADMIN — ASPIRIN 325 MG ORAL TABLET 325 MG: 325 PILL ORAL at 19:45

## 2025-07-21 ASSESSMENT — ACTIVITIES OF DAILY LIVING (ADL)
ADLS_ACUITY_SCORE: 41
ADLS_ACUITY_SCORE: 41
ADLS_ACUITY_SCORE: 43
ADLS_ACUITY_SCORE: 41
ADLS_ACUITY_SCORE: 45
ADLS_ACUITY_SCORE: 45
ADLS_ACUITY_SCORE: 41
ADLS_ACUITY_SCORE: 45
ADLS_ACUITY_SCORE: 41
ADLS_ACUITY_SCORE: 41

## 2025-07-21 NOTE — ED PROVIDER NOTES
"  Emergency Department Note      History of Present Illness     Chief Complaint   Hypertension and Stroke Symptoms      HPI   Saji Morrison is a 75 year old male with a past medical history significant for hypertension and hyperlipidemia who presents for evaluation of hypertension and stroke symptoms. The patient reports that he woke up three days ago with right sided arm and leg numbness. He denies that it was difficult to move his right side. He reports that when he stood up, he felt \"tipsy\" and his right hand felt numb or thick. The patient's significant other reports that three days ago, [place] noticed his right foot was dragging and was mumbling when he spoke. The patient went to lay down and drank a lot of water afterwards and felt better when he got up later. The patient reports that yesterday, he felt pretty much normal with the exception of an episode while he was in his office. The patient reports that he had a tingling sensation in his right hand and was unable to hit the correct keys when typing. The patient reports that this morning, he had a little bit of weakness in his right side in both his arm and leg that has since resolved. He is feeling fine currently and has normal sensation in his right arm and right side. The patient reports that he has been hypotensive today. He reports that his systolic blood pressure has been around 137 while his diastolic blood pressure has been between 83 and 95. The patient's significant other reports that they called the patient's normal doctor today who referred them to the emergency department. The patient's significant other reports that the patient had a-fib in October and the patient has not started any interventions to address it. The patient reports that his father had heart failure and several myocardial infarctions.     Independent Historian   Significant other as detailed above.    Review of External Notes "       Past Medical History     Medical History and Problem List   Ankylosing spondylitis of sacral region   Hypertension   Diaphragmatic hernia without mention of obstruction or gangrene  Hyperlipidemia   Impotence of organic origin  Osteoarthritis of hip, bilateral    Medications   Atorvastatin  Irbesartan    Surgical History   Herniorrhaphy, inguinal bilateral  Right hip replacement    Physical Exam     Patient Vitals for the past 24 hrs:   BP Temp Temp src Pulse Resp SpO2 Weight   07/21/25 1659 (!) 179/128 -- -- 90 -- 97 % --   07/21/25 1629 (!) 166/117 -- -- 101 16 98 % --   07/21/25 1559 (!) 170/113 -- -- 90 15 98 % --   07/21/25 1529 (!) 178/127 -- -- 89 -- -- --   07/21/25 1518 (!) 180/109 -- -- 100 18 -- --   07/21/25 1400 (!) 180/129 -- -- 105 11 100 % --   07/21/25 1344 (!) 195/144 -- -- 100 -- 99 % --   07/21/25 1329 (!) 186/123 -- -- -- -- -- --   07/21/25 1003 (!) 176/122 -- -- -- -- -- --   07/21/25 0956 (!) 185/112 97.9  F (36.6  C) Temporal 93 18 98 % 86.6 kg (191 lb)     Physical Exam  Gen: well appearing, in no acute distress  Oral : Mucous membranes moist,   Nose: No rhinorhea  Ears: External near normal, without drainage  Eyes: periorbital tissues and sclera normal   Neck: supple, no abnormal swelling  Lungs: Clear bilaterally, no tachypnea or distress, speaks full sentences  CV: Regular rate, regular rhythm  Abd: soft, nontender, nondistended, no rebound/guarding  Ext: no lower extremity edema  Skin: warm, dry, well perfused, no rashes/bruising/lesions on exposed skin  Neuro: alert, no gross motor or sensory deficits, symmetric strength in arms and legs, no visual field cuts, pupils round and reactive, intraocular movements intact, symmetric sensation to inner arms and thumbs as well as lower legs, no facial asymmetry, fine motor movements of fingers in each hand is intact and symmetric  Psych: pleasant mood, normal affect     Diagnostics     Lab Results   Labs Ordered and Resulted from Time of  ED Arrival to Time of ED Departure   COMPREHENSIVE METABOLIC PANEL - Abnormal       Result Value    Sodium 140      Potassium 4.4      Carbon Dioxide (CO2) 24      Anion Gap 10      Urea Nitrogen 19.7      Creatinine 0.88      GFR Estimate 89      Calcium 9.5      Chloride 106      Glucose 102 (*)     Alkaline Phosphatase 126      AST 21      ALT 18      Protein Total 7.4      Albumin 4.3      Bilirubin Total 0.8     CBC WITH PLATELETS AND DIFFERENTIAL    WBC Count 6.7      RBC Count 4.42      Hemoglobin 14.1      Hematocrit 41.1      MCV 93      MCH 31.9      MCHC 34.3      RDW 12.9      Platelet Count 150      % Neutrophils 61      % Lymphocytes 30      % Monocytes 8      % Eosinophils 0      % Basophils 0      % Immature Granulocytes 0      NRBCs per 100 WBC 0      Absolute Neutrophils 4.1      Absolute Lymphocytes 2.0      Absolute Monocytes 0.5      Absolute Eosinophils 0.0      Absolute Basophils 0.0      Absolute Immature Granulocytes 0.0      Absolute NRBCs 0.0       Imaging   CTA Head Neck with Contrast   Final Result   IMPRESSION:    HEAD CT:   1.  Age-indeterminate presumed small vessel infarcts involving the left centrum semiovale white matter and posterior left putamen. The acuity of these findings could be better evaluated by MRI.   2.  No evidence for acute intracranial hemorrhage or other evidence for acute intracranial process.   3.  Background of mild presumed senescent change.      HEAD CTA:    1.  No significant stenosis, aneurysm, or high flow vascular malformation identified.   2.  Variant [place] of [place] anatomy as above.      NECK CTA:   1.  No hemodynamically significant stenosis in the carotid vessels.    2.  Moderate to high-grade stenosis at the right vertebral artery origin.   3.  No evidence for dissection.      Head CT w/o contrast   Final Result   IMPRESSION:    HEAD CT:   1.  Age-indeterminate presumed small vessel infarcts involving the left centrum semiovale white matter and  posterior left putamen. The acuity of these findings could be better evaluated by MRI.   2.  No evidence for acute intracranial hemorrhage or other evidence for acute intracranial process.   3.  Background of mild presumed senescent change.      HEAD CTA:    1.  No significant stenosis, aneurysm, or high flow vascular malformation identified.   2.  Variant [place] of [place] anatomy as above.      NECK CTA:   1.  No hemodynamically significant stenosis in the carotid vessels.    2.  Moderate to high-grade stenosis at the right vertebral artery origin.   3.  No evidence for dissection.      MR Brain w/o & w Contrast    (Results Pending)     EKG   ECG taken at 1001, ECG read at 1338  Atrial fibrillation    Rate 96 bpm. TN interval * ms. QRS duration 88 ms. QT/QTc 332/419 ms. P-R-T axes * 14 61.    Independent Interpretation   CT Head: No obvious hemorrhage.    ED Course      Medications Administered   Medications   iopamidol (ISOVUE-370) solution 67 mL (67 mLs Intravenous $Given 7/21/25 1445)   sodium chloride 0.9 % bag for CT scan flush (90 mLs Intravenous $Given 7/21/25 1445)     Procedures   Procedures     Discussion of Management   Stroke Neurology, Clare Hess PA-C   Admitting Hospitalist, Dr. Zee    ED Course   ED Course as of 07/21/25 1719   Mon Jul 21, 2025   1337 I obtained history and examined the patient as noted above.    1541 I discussed the patient's presentation with Clare Hess PA-C, Stroke Neurology.   1626 I spoke with Dr. Zee, hospitalist, who accepted the patient for admission.        Additional Documentation  None    Medical Decision Making / Diagnosis     CMS Diagnoses: None    MIPS   None               [facility]   Saji Morrison is a 75 year old male presents to ED with persistent hypertension, episodes of right-sided weakness on Saturday and Sunday.  Saturday seemed a bit more intense with right-sided weakness since what sounds like some dysarthria.  Sunday was more right hand difficulty  typing account of a numb feeling.  Today he is feeling essentially normal, his exam is not suggestive of focal stroke symptoms at this time.  He does appear to be an undiagnosed atrial fibrillation not on an anticoagulant.  CT shows no intracranial hemorrhage, question of some small vessel ischemic disease.  Looks like there is a vertebral artery stenosis on the right.  Persistently hypertensive in the ED.  Spoke to stroke neurology after the imaging who recommended there is okay to start anticoagulation for his history of atrial fibrillation.  They did not have strong blood pressure recommendations currently.  Recommended an MRI in the hospital.  Discussed with hospitalist who is in agreement for admission    Disposition   The patient was admitted to the hospital.     Diagnosis     ICD-10-CM    1. New onset atrial fibrillation (H)  I48.91       2. TIA (transient ischemic attack)  G45.9       3. Hypertension, unspecified type  I10       4. Stenosis of right vertebral artery  I65.01            Discharge Medications   New Prescriptions    No medications on file         Scribe Disclosure:  I, Mar Doty, am serving as a scribe at 1:31 PM on 7/21/2025 to document services personally performed by Eddie Munoz, based on my observations and the provider's statements to me.        Eddie Munoz MD  07/21/25 [id number]

## 2025-07-21 NOTE — PROGRESS NOTES
RECEIVING UNIT ED HANDOFF REVIEW    ED Nurse Handoff Report was reviewed by: Ciera Lazo RN on July 21, 2025 at 6:16 PM

## 2025-07-21 NOTE — TELEPHONE ENCOUNTER
Patient was denied from [facility] because needs immediate stroke treatment/ workup. [facility] nurse called and communicated this. Patient sent to the ER. Patient agreeable to go.     YOUNG Nielsen  [facility] Triage

## 2025-07-21 NOTE — PROGRESS NOTES
Pt arrived @ 1840 settled in bed, neuro completed, awaiting hospitalist orders, handoff given to oncoming shift.

## 2025-07-21 NOTE — ED TRIAGE NOTES
Patient woke up Saturday morning with right sided numbness to UE and LE. Patient also thought he had some slurred speech, unsteadiness, and noticed his right foot was dragging a bit when he walked.  Throughout the day the speech and walking became normal. When patient woke up on Sunday he was still having the right sided numbess but thought it got better through out the day. This morning is still having a little numbness to both RUE and LLE. Spoke to his doctor who advised patient to be seen in the ED right away.  Patient denies any blood thinners. Does take his blood pressure. Patient states his blood pressure at home is normal and was elevated here.

## 2025-07-21 NOTE — PHARMACY-ADMISSION MEDICATION HISTORY
Pharmacist Admission Medication History    Admission medication history is complete. The information provided in this note is only as accurate as the sources available at the time of the update.    Information Source(s): Patient and CareEverywhere/SureScripts via in-person    Changes made to PTA medication list:  Added: ibuprofen  Deleted: None  Changed: None    Allergies reviewed with patient and updates made in EHR: no    Medication History Completed By: Donna Fernandez RPH 7/21/2025 4:56 PM    PTA Med List   Medication Sig Last Dose/Taking    atorvastatin (LIPITOR) 10 MG tablet Take 1 tablet (10 mg) by mouth daily. 7/21/2025 Morning    ibuprofen (ADVIL/MOTRIN) 200 MG tablet Take 200 mg by mouth daily. 7/21/2025 Morning    irbesartan (AVAPRO) 300 MG tablet Take 1 tablet (300 mg) by mouth at bedtime. (Patient taking differently: Take 300 mg by mouth daily.) 7/21/2025 Morning

## 2025-07-21 NOTE — CONSULTS
"      [facility]    Stroke Consult Note    Chief Complaint: Hypertension and Stroke Symptoms      HPI  Saji Morrison is a 75 year old left handed male who with prior episode of \"feeling off balance\", slurred speech and right upper and lower extremity weakness two days ago. On 07/19, right arm and leg weakness lasted about 4 hours. [place] and was walking and experienced a tingling sensation. His partner reports episode of slurred speech about 4 hours that resolved as resolved. While walking up the stairs, Saji reports \"feeling off balance.\" On 07/20, experienced right arm tingling, but not in his right leg for about 4 hours. He completed his regular routine. No speech changes or new symptoms.      Today, he sent a message about his symptoms to PCP and recommended going to the ED. Arrived at 0915 today. Saji denies any weakness, numbness, headache, vision or speech changes. He reports his only symptom is frequent urination 5x since ED arrival.     PMH includes HLD, HTN, ankylosing spondylitis. PTA medication include Lipitor 10 mg. Reports taking BP medications and statin today.     Saji reports a prior episode of atrial fibrillation in October during his cataract surgery.  He states his PCP did not start him on anticoagulation. Saji denies any palpitations, shortness of breath, sweating.     Stroke Evaluation Summarized    MRI/Head CT MRI brain: Ordered    Intracranial Vasculature CTA head: No LVO, left SONJA stenoses, bilateral carotid siphon atherosclerotic disease   Cervical Vasculature CTA neck: Moderate stenoses of the right vertebral artery origin     Echocardiogram Not ordered   EKG/Telemetry Per ED provider, atrial fibrillation   Other Testing Not Applicable      LDL  No lab value available in past 30 days   A1C  No lab value available in past 90 days   Troponin No lab value available in past 48 hrs     [id number] score for atrial fibrillation stroke risk = 3   2 " "point for age greater than 75, and 1 point for hypertension    Impression  1.) Episode of right sided weakness, slurred speech and \"feeling off balance\" for 4 hours and resolved. Differential includes TIA vs stroke vs metabolic infectious vs peripheral etiology.  Suspicious for TIA as symptoms do not localize to single vascular territory.  Plan for MRI brain to assess for stroke.    Recommendations   -MRI brain with and without contrast (ordered)  - Load with 325 mg aspirin   - Based on [id number] score, anticoagulation is indicated. Recommend waiting to start anticoagulation post MRI brain results    If MRI brain is positive for acute stroke, please contact stroke team immediately    Patient Follow-up    - final recommendation pending work-up    Thank you for this consult. We will continue to follow.     Evelyne De Leon PA-C  Vascular Neurology    To page me or covering stroke neurology team member, click here: [facility]  Choose \"On Call\" tab at top, then select \"NEUROLOGY/ALL SITES\" from middle drop-down box, press Enter, then look for \"stroke\" or \"telestroke\" for your site.  _____________________________________________________    Clinically Significant Risk Factors Present on Admission                   # Hypertension: Noted on problem list           # Overweight: Estimated body mass index is 27.65 kg/m  as calculated from the following:    Height as of 6/23/25: 1.77 m (5' 9.69\").    Weight as of this encounter: 86.6 kg (191 lb).              Past Medical History    Past Medical History:   Diagnosis Date    Ankylosing spondylitis of sacral region (H)     BENIGN HYPERTENSION 9/29/2003    Diaphragmatic hernia without mention of obstruction or gangrene 1989    Hyperlipidemia LDL goal <130 10/31/2010    Impotence of organic origin     IMPOTENCE, ORGANIC ORIGN 11/26/2003    Infectious mononucleosis 1960     Medications   Home Meds  Prior to Admission medications    Medication Sig Start Date End Date Taking? " Authorizing Provider   atorvastatin (LIPITOR) 10 MG tablet Take 1 tablet (10 mg) by mouth daily. 4/10/25  Yes Yared Gallardo MD   ibuprofen (ADVIL/MOTRIN) 200 MG tablet Take 200 mg by mouth daily.   Yes Unknown, Entered By History   irbesartan (AVAPRO) 300 MG tablet Take 1 tablet (300 mg) by mouth at bedtime.  Patient taking differently: Take 300 mg by mouth daily. 4/10/25  Yes Yared Gallardo MD   tadalafil (CIALIS) 20 MG tablet TAKE ONE TABLET BY MOUTH DAILY AS NEEDED FOR ERECTILE DYSFUNCTION. MAX 20MG DAILY. DO NOT USE WITH NTG PRODUCTS 4/10/25   Yared Gallardo MD       Scheduled Meds  Current Facility-Administered Medications   Medication Dose Route Frequency Provider Last Rate Last Admin    apixaban ANTICOAGULANT (ELIQUIS) tablet 5 mg  5 mg Oral BID William Zee MD           Infusion Meds  Current Facility-Administered Medications   Medication Dose Route Frequency Provider Last Rate Last Admin       Allergies   Allergies   Allergen Reactions    Lisinopril      cough    Sertraline      Shakiness      Thiazide-Type Diuretics      PRINZIDE          PHYSICAL EXAMINATION   Temp:  [97.9  F (36.6  C)] 97.9  F (36.6  C)  Pulse:  [] 90  Resp:  [11-18] 16  BP: (166-195)/(109-144) 179/128  SpO2:  [97 %-100 %] 97 %    General Exam  General:  patient lying in bed without any acute distress    HEENT:  normocephalic/atraumatic  Pulmonary:  no respiratory distress    Neuro Exam  Mental Status:  alert, oriented x 3, follows commands, speech clear and fluent, naming and repetition normal  Cranial Nerves:  visual fields intact, EOMI with normal smooth pursuit, facial sensation intact and symmetric, facial movements symmetric, hearing not formally tested but intact to conversation, no dysarthria, tongue protrusion midline  Motor:  no abnormal movements, able to move all limbs spontaneously, strength 5/5 throughout upper and lower extremities, no pronator drift  Reflexes:  not tested  Sensory:  light touch sensation  intact and symmetric throughout upper and lower extremities, no extinction on double simultaneous stimulation   Coordination:  normal finger-to-nose and heel-to-shin bilaterally without dysmetria  Station/Gait:  deferred    Stroke Scales    NIHSS  1a. Level of Consciousness 0-->Alert, keenly responsive   1b. LOC Questions 0-->Answers both questions correctly   1c. LOC Commands 0-->Performs both tasks correctly   2.   Best Gaze 0-->Normal   3.   Visual 0-->No visual loss   4.   Facial Palsy 0-->Normal symmetrical movements   5a. Motor Arm, Left 0-->No drift, limb holds 90 (or 45) degrees for full 10 secs   5b. Motor Arm, Right 0-->No drift, limb holds 90 (or 45) degrees for full 10 secs   6a. Motor Leg, Left 0-->No drift, leg holds 30 degree position for full 5 secs   6b. Motor Leg, right 0-->No drift, leg holds 30 degree position for full 5 secs   7.   Limb Ataxia 0-->Absent   8.   Sensory 0-->Normal, no sensory loss   9.   Best Language 0-->No aphasia, normal   10. Dysarthria 0-->Normal   11. Extinction and Inattention  0-->No abnormality   Total 0 (07/21/25 1745)       Imaging  I personally reviewed all imaging; relevant findings per HPI.    Labs Data   CBC  Recent Labs   Lab 07/21/25  1012   WBC 6.7   RBC 4.42   HGB 14.1   HCT 41.1        Basic Metabolic Panel   Recent Labs   Lab 07/21/25  1012      POTASSIUM 4.4   CHLORIDE 106   CO2 24   BUN 19.7   CR 0.88   *   RAS 9.5     Liver Panel  Recent Labs   Lab 07/21/25  1012   PROTTOTAL 7.4   ALBUMIN 4.3   BILITOTAL 0.8   ALKPHOS 126   AST 21   ALT 18     INR  No lab results found.        Stroke Consult Data Data   This was a non-emergent, non-telestroke consult.  I have personally spent a total of 60 minutes providing care today, time spent in reviewing medical records and devising the plan as recorded above.

## 2025-07-21 NOTE — PROGRESS NOTES
"[facility]  ED Nurse Handoff Report    ED Chief complaint: Hypertension and Stroke Symptoms      ED Diagnosis:   Final diagnoses:   New onset atrial fibrillation (H)   TIA (transient ischemic attack)   Hypertension, unspecified type   Stenosis of right vertebral artery       Code Status: admitting physician to address    Allergies:   Allergies   Allergen Reactions    Lisinopril      cough    Sertraline      Shakiness      Thiazide-Type Diuretics      PRINZIDE       Patient Story: \"Patient woke up Saturday morning with right sided numbness to UE and LE. Patient also thought he had some slurred speech, unsteadiness, and noticed his right foot was dragging a bit when he walked.  Throughout the day the speech and walking became normal. When patient woke up on Sunday he was still having the right sided numbess but thought it got better through out the day. This morning is still having a little numbness to both RUE and LLE. Spoke to his doctor who advised patient to be seen in the ED right away.  Patient denies any blood thinners. Does take his blood pressure. Patient states his blood pressure at home is normal and was elevated here. \"    Focused Assessment:  pt is alert and oriented, well appearing in the ed cart, does not appear to be in any overt pain.     Treatments and/or interventions provided:   Medications   apixaban ANTICOAGULANT (ELIQUIS) tablet 5 mg (has no administration in time range)   iopamidol (ISOVUE-370) solution 67 mL (67 mLs Intravenous $Given 7/21/25 1445)   sodium chloride 0.9 % bag for CT scan flush (90 mLs Intravenous $Given 7/21/25 1445)     Labs Ordered and Resulted from Time of ED Arrival to Time of ED Departure   COMPREHENSIVE METABOLIC PANEL - Abnormal       Result Value    Sodium 140      Potassium 4.4      Carbon Dioxide (CO2) 24      Anion Gap 10      Urea Nitrogen 19.7      Creatinine 0.88      GFR Estimate 89      Calcium 9.5      Chloride 106      Glucose 102 (*)     " Alkaline Phosphatase 126      AST 21      ALT 18      Protein Total 7.4      Albumin 4.3      Bilirubin Total 0.8     CBC WITH PLATELETS AND DIFFERENTIAL    WBC Count 6.7      RBC Count 4.42      Hemoglobin 14.1      Hematocrit 41.1      MCV 93      MCH 31.9      MCHC 34.3      RDW 12.9      Platelet Count 150      % Neutrophils 61      % Lymphocytes 30      % Monocytes 8      % Eosinophils 0      % Basophils 0      % Immature Granulocytes 0      NRBCs per 100 WBC 0      Absolute Neutrophils 4.1      Absolute Lymphocytes 2.0      Absolute Monocytes 0.5      Absolute Eosinophils 0.0      Absolute Basophils 0.0      Absolute Immature Granulocytes 0.0      Absolute NRBCs 0.0       CTA Head Neck with Contrast   Final Result   IMPRESSION:    HEAD CT:   1.  Age-indeterminate presumed small vessel infarcts involving the left centrum semiovale white matter and posterior left putamen. The acuity of these findings could be better evaluated by MRI.   2.  No evidence for acute intracranial hemorrhage or other evidence for acute intracranial process.   3.  Background of mild presumed senescent change.      HEAD CTA:    1.  No significant stenosis, aneurysm, or high flow vascular malformation identified.   2.  Variant [place] anatomy as above.      NECK CTA:   1.  No hemodynamically significant stenosis in the carotid vessels.    2.  Moderate to high-grade stenosis at the right vertebral artery origin.   3.  No evidence for dissection.      Head CT w/o contrast   Final Result   IMPRESSION:    HEAD CT:   1.  Age-indeterminate presumed small vessel infarcts involving the left centrum semiovale white matter and posterior left putamen. The acuity of these findings could be better evaluated by MRI.   2.  No evidence for acute intracranial hemorrhage or other evidence for acute intracranial process.   3.  Background of mild presumed senescent change.      HEAD CTA:    1.  No significant stenosis, aneurysm, or high flow  vascular malformation identified.   2.  Variant [place] of [place] anatomy as above.      NECK CTA:   1.  No hemodynamically significant stenosis in the carotid vessels.    2.  Moderate to high-grade stenosis at the right vertebral artery origin.   3.  No evidence for dissection.      MR Brain w/o & w Contrast    (Results Pending)       Patient's response to treatments and/or interventions: tolerated     To be done/followed up on inpatient unit:  possibly MRI     Does this patient have any cognitive concerns?: alert and oriented     Activity level - Baseline/Home:  Independent  Activity Level - Current:   Independent    Patient's Preferred language: English   Needed?: No    Isolation: None  Infection: Not Applicable  Sepsis treatment initiated: No  Patient tested for COVID 19 prior to admission: NO  Bariatric?: No    Vital Signs:   Vitals:    07/21/25 1529 07/21/25 1559 07/21/25 1629 07/21/25 1659   BP: (!) 178/127 (!) 170/113 (!) 166/117 (!) 179/128   BP Location:       Pulse: 89 90 101 90   Resp:  15 16    Temp:       TempSrc:       SpO2:  98% 98% 97%   Weight:           Cardiac Rhythm:  a-fib, rate varies high 80s to 100s, to 160s-170s; sustained more so in 80-100s    Was the PSS-3 completed:   Yes  Family Comments: SO at bedside for part of stay  OBS brochure/video discussed/provided to patient/family: N/A    For the majority of the shift this patient's behavior was Green.   Behavioral interventions performed were none.    ED NURSE PHONE NUMBER: *[phone]

## 2025-07-21 NOTE — TELEPHONE ENCOUNTER
"Called pt to triage his reported episode of one-sided weakness and numbness over the weekend.   He stated, \"I feel pretty good today, yeah. I thought it was severe dehydration.\"    Pt denies any symptoms at this time. He denies numbness, weakness, speech or mobility changes.     He would prefer not to go to the ER, and is interested in [facility].     Routing to after hours pool per protocol.       RN Referral to Acute and Diagnostic Services    [phone] ([place]) [address] [facility], [address], [place] [address]           Presenting symptoms/reason for ADS referral:  Brief neuro changes now gone    Relevant Medical History:      Transition to [facility]S clinic discussed with patient and patient is agreeable.    Patient has been advised that appointments at [facility]S typically takes significantly longer than in clinic/urgent care (~ 2.5-3 hours or more):  YES  Patient has transportation and is available for ASAP same day appointment: Yes  Patient aware that [facility] will contact them directly YES     Does patient does have claustrophobia No  Patient has contrast allergy No  Patient has PICC line or port in place No                 Reason for Disposition   Neurologic deficit that was brief (now gone), ANY of the following: * Weakness of the face, arm, or leg on one side of the body * Numbness of the face, arm, or leg on one side of the body * Loss of speech or garbled speech    Additional Information   Negative: SEVERE weakness (e.g., unable to walk or barely able to walk, requires support) and new-onset or getting worse   Negative: Difficult to awaken or acting confused (e.g., disoriented, slurred speech)   Negative: Sudden onset of weakness of the face, arm or leg on one side of the body and present now (Exception: Bell's palsy suspected: weakness on one side of the face developing over hours to days, with no other symptoms.)   Negative: Sudden onset of numbness of the face, arm or leg on one side of the body and present now   " Negative: Sudden onset of loss of speech or garbled speech and present now   Negative: Sounds like a life-threatening emergency to the triager   Negative: Confusion, disorientation, or hallucinations is main symptom   Negative: Dizziness is main symptom   Negative: Followed a head injury within last 3 days   Negative: Headache (with neurologic deficit)   Negative: Unable to urinate (or only a few drops) and bladder feels very full   Negative: Loss of bladder or bowel control (urine or bowel incontinence; wetting self, leaking stool) of new-onset   Negative: Back pain with numbness (loss of sensation) in groin or rectal area    Protocols used: Neurologic Deficit-A-OH  Thank you,  Najma Caldera RN

## 2025-07-22 ENCOUNTER — APPOINTMENT (OUTPATIENT)
Dept: PHYSICAL THERAPY | Facility: CLINIC | Age: 75
DRG: 065 | End: 2025-07-22
Attending: STUDENT IN AN ORGANIZED HEALTH CARE EDUCATION/TRAINING PROGRAM
Payer: COMMERCIAL

## 2025-07-22 ENCOUNTER — APPOINTMENT (OUTPATIENT)
Dept: CARDIOLOGY | Facility: CLINIC | Age: 75
DRG: 065 | End: 2025-07-22
Attending: STUDENT IN AN ORGANIZED HEALTH CARE EDUCATION/TRAINING PROGRAM
Payer: COMMERCIAL

## 2025-07-22 LAB
ANION GAP SERPL CALCULATED.3IONS-SCNC: 13 MMOL/L (ref 7–15)
BUN SERPL-MCNC: 18.6 MG/DL (ref 8–23)
CALCIUM SERPL-MCNC: 9.2 MG/DL (ref 8.8–10.4)
CHLORIDE SERPL-SCNC: 105 MMOL/L (ref 98–107)
CHOLEST SERPL-MCNC: 177 MG/DL
CREAT SERPL-MCNC: 0.72 MG/DL (ref 0.67–1.17)
EGFRCR SERPLBLD CKD-EPI 2021: >90 ML/MIN/1.73M2
ERYTHROCYTE [DISTWIDTH] IN BLOOD BY AUTOMATED COUNT: 12.8 % (ref 10–15)
EST. AVERAGE GLUCOSE BLD GHB EST-MCNC: 111 MG/DL
GLUCOSE BLDC GLUCOMTR-MCNC: 106 MG/DL (ref 70–99)
GLUCOSE BLDC GLUCOMTR-MCNC: 114 MG/DL (ref 70–99)
GLUCOSE SERPL-MCNC: 95 MG/DL (ref 70–99)
HBA1C MFR BLD: 5.5 %
HCO3 SERPL-SCNC: 22 MMOL/L (ref 22–29)
HCT VFR BLD AUTO: 40.9 % (ref 40–53)
HDLC SERPL-MCNC: 74 MG/DL
HGB BLD-MCNC: 14.1 G/DL (ref 13.3–17.7)
INR PPP: 1.25 (ref 0.85–1.15)
LDLC SERPL CALC-MCNC: 83 MG/DL
LVEF ECHO: NORMAL
MCH RBC QN AUTO: 31.7 PG (ref 26.5–33)
MCHC RBC AUTO-ENTMCNC: 34.5 G/DL (ref 31.5–36.5)
MCV RBC AUTO: 92 FL (ref 78–100)
NONHDLC SERPL-MCNC: 103 MG/DL
PLATELET # BLD AUTO: 146 10E3/UL (ref 150–450)
POTASSIUM SERPL-SCNC: 3.9 MMOL/L (ref 3.4–5.3)
PROTHROMBIN TIME: 15.4 SECONDS (ref 11.8–14.8)
RBC # BLD AUTO: 4.45 10E6/UL (ref 4.4–5.9)
SODIUM SERPL-SCNC: 140 MMOL/L (ref 135–145)
TRIGL SERPL-MCNC: 98 MG/DL
WBC # BLD AUTO: 6.8 10E3/UL (ref 4–11)

## 2025-07-22 PROCEDURE — C8929 TTE W OR WO FOL WCON,DOPPLER: HCPCS

## 2025-07-22 PROCEDURE — 85610 PROTHROMBIN TIME: CPT | Performed by: STUDENT IN AN ORGANIZED HEALTH CARE EDUCATION/TRAINING PROGRAM

## 2025-07-22 PROCEDURE — 999N000226 HC STATISTIC SLP IP EVAL DEFER: Performed by: SPEECH-LANGUAGE PATHOLOGIST

## 2025-07-22 PROCEDURE — 99233 SBSQ HOSP IP/OBS HIGH 50: CPT | Mod: FS

## 2025-07-22 PROCEDURE — 82374 ASSAY BLOOD CARBON DIOXIDE: CPT | Performed by: STUDENT IN AN ORGANIZED HEALTH CARE EDUCATION/TRAINING PROGRAM

## 2025-07-22 PROCEDURE — 99232 SBSQ HOSP IP/OBS MODERATE 35: CPT | Performed by: STUDENT IN AN ORGANIZED HEALTH CARE EDUCATION/TRAINING PROGRAM

## 2025-07-22 PROCEDURE — 85014 HEMATOCRIT: CPT | Performed by: STUDENT IN AN ORGANIZED HEALTH CARE EDUCATION/TRAINING PROGRAM

## 2025-07-22 PROCEDURE — 93306 TTE W/DOPPLER COMPLETE: CPT | Mod: 26 | Performed by: INTERNAL MEDICINE

## 2025-07-22 PROCEDURE — 250N000013 HC RX MED GY IP 250 OP 250 PS 637

## 2025-07-22 PROCEDURE — 97161 PT EVAL LOW COMPLEX 20 MIN: CPT | Mod: GP

## 2025-07-22 PROCEDURE — 255N000002 HC RX 255 OP 636: Performed by: STUDENT IN AN ORGANIZED HEALTH CARE EDUCATION/TRAINING PROGRAM

## 2025-07-22 PROCEDURE — 120N000001 HC R&B MED SURG/OB

## 2025-07-22 PROCEDURE — 36415 COLL VENOUS BLD VENIPUNCTURE: CPT | Performed by: STUDENT IN AN ORGANIZED HEALTH CARE EDUCATION/TRAINING PROGRAM

## 2025-07-22 PROCEDURE — 99418 PROLNG IP/OBS E/M EA 15 MIN: CPT | Mod: FS

## 2025-07-22 PROCEDURE — 999N000111 HC STATISTIC OT IP EVAL DEFER

## 2025-07-22 PROCEDURE — 250N000013 HC RX MED GY IP 250 OP 250 PS 637: Performed by: STUDENT IN AN ORGANIZED HEALTH CARE EDUCATION/TRAINING PROGRAM

## 2025-07-22 RX ORDER — WARFARIN SODIUM 7.5 MG/1
7.5 TABLET ORAL
Status: DISCONTINUED | OUTPATIENT
Start: 2025-07-22 | End: 2025-07-22

## 2025-07-22 RX ORDER — AMLODIPINE BESYLATE 10 MG/1
10 TABLET ORAL DAILY PRN
Status: DISCONTINUED | OUTPATIENT
Start: 2025-07-22 | End: 2025-07-23

## 2025-07-22 RX ORDER — IRBESARTAN 75 MG/1
300 TABLET ORAL DAILY
Status: DISCONTINUED | OUTPATIENT
Start: 2025-07-22 | End: 2025-07-23 | Stop reason: HOSPADM

## 2025-07-22 RX ORDER — AMLODIPINE BESYLATE 10 MG/1
10 TABLET ORAL DAILY
Status: DISCONTINUED | OUTPATIENT
Start: 2025-07-22 | End: 2025-07-22

## 2025-07-22 RX ORDER — WARFARIN SODIUM 5 MG/1
5 TABLET ORAL
Status: COMPLETED | OUTPATIENT
Start: 2025-07-22 | End: 2025-07-22

## 2025-07-22 RX ADMIN — ATORVASTATIN CALCIUM 10 MG: 10 TABLET, FILM COATED ORAL at 08:01

## 2025-07-22 RX ADMIN — ASPIRIN 81 MG: 81 TABLET, DELAYED RELEASE ORAL at 08:01

## 2025-07-22 RX ADMIN — IRBESARTAN 300 MG: 75 TABLET ORAL at 16:44

## 2025-07-22 RX ADMIN — WARFARIN SODIUM 5 MG: 5 TABLET ORAL at 21:47

## 2025-07-22 RX ADMIN — PERFLUTREN 2 ML (DILUTED): 6.52 INJECTION, SUSPENSION INTRAVENOUS at 10:29

## 2025-07-22 ASSESSMENT — ACTIVITIES OF DAILY LIVING (ADL)
ADLS_ACUITY_SCORE: 24
ADLS_ACUITY_SCORE: 18
ADLS_ACUITY_SCORE: 18
ADLS_ACUITY_SCORE: 24
ADLS_ACUITY_SCORE: 19
ADLS_ACUITY_SCORE: 24
ADLS_ACUITY_SCORE: 24
ADLS_ACUITY_SCORE: 45
ADLS_ACUITY_SCORE: 24
DEPENDENT_IADLS:: INDEPENDENT;CLEANING
ADLS_ACUITY_SCORE: 19
ADLS_ACUITY_SCORE: 18
ADLS_ACUITY_SCORE: 24
ADLS_ACUITY_SCORE: 18
ADLS_ACUITY_SCORE: 45
ADLS_ACUITY_SCORE: 45
ADLS_ACUITY_SCORE: 19
ADLS_ACUITY_SCORE: 45
ADLS_ACUITY_SCORE: 24
ADLS_ACUITY_SCORE: 18
ADLS_ACUITY_SCORE: 18
ADLS_ACUITY_SCORE: 19
ADLS_ACUITY_SCORE: 18
ADLS_ACUITY_SCORE: 18

## 2025-07-22 NOTE — CONSULTS
Care Management Initial Consult    General Information  Assessment completed with: Patient, Spouse or significant other,    Type of CM/SW Visit: Initial Assessment    Primary Care Provider verified and updated as needed: Yes   Readmission within the last 30 days: no previous admission in last 30 days      Reason for Consult: discharge planning  Advance Care Planning: Advance Care Planning Reviewed: present on chart, no concerns identified  Patient has a healthcare directive uploaded to the chart     Communication Assessment  Patient's communication style: spoken language (English or Bilingual)    Hearing Difficulty or Deaf: no   Wear Glasses or Blind: no    Cognitive  Cognitive/Neuro/Behavioral: WDL  Level of Consciousness: alert     Orientation: oriented x 4  Mood/Behavior: cooperative, calm  Best Language: 0 - No aphasia  Speech: clear, spontaneous, logical    Living Environment:   People in home: significant other  Carla  Current living Arrangements: house      Able to return to prior arrangements: other (see comments) (to be determined)     Family/Social Support:  Care provided by: self  Provides care for: no one  Marital Status: Lives with Significant Other  Support system: Partner, Sibling(s)          Description of Support System: Supportive, Involved    Support Assessment: Adequate family and caregiver support, Adequate social supports    Current Resources:   Patient receiving home care services: No  Community Resources: Housekeeping/Chore Agency  Equipment currently used at home: none    Employment/Financial:  Employment Status: retired     Financial Concerns: none   Referral to Financial Worker: No     Does the patient's insurance plan have a 3 day qualifying hospital stay waiver?  Yes   Which insurance plan 3 day waiver is available? Alternative insurance waiver  Will the waiver be used for post-acute placement? Undetermined at this time    Lifestyle & Psychosocial Needs:  Social Drivers of Health      Food Insecurity: Low Risk  (7/22/2025)    Food Insecurity     Within the past 12 months, did you worry that your food would run out before you got money to buy more?: No     Within the past 12 months, did the food you bought just not last and you didn t have money to get more?: No   Depression: Not at risk (4/10/2025)    PHQ-2     PHQ-2 Score: 0   Housing Stability: Low Risk  (7/22/2025)    Housing Stability     Do you have housing? : Yes     Are you worried about losing your housing?: No   Tobacco Use: Low Risk  (6/23/2025)    Patient History     Smoking Tobacco Use: Never     Smokeless Tobacco Use: Never     Passive Exposure: Never   Financial Resource Strain: Low Risk  (7/22/2025)    Financial Resource Strain     Within the past 12 months, have you or your family members you live with been unable to get utilities (heat, electricity) when it was really needed?: No   Alcohol Use: Not on file   Transportation Needs: Low Risk  (7/22/2025)    Transportation Needs     Within the past 12 months, has lack of transportation kept you from medical appointments, getting your medicines, non-medical meetings or appointments, work, or from getting things that you need?: No   Physical Activity: Insufficiently Active (4/7/2025)    Exercise Vital Sign     Days of Exercise per Week: 5 days     Minutes of Exercise per Session: 10 min   Interpersonal Safety: Low Risk  (7/22/2025)    Interpersonal Safety     Do you feel physically and emotionally safe where you currently live?: Yes     Within the past 12 months, have you been hit, slapped, kicked or otherwise physically hurt by someone?: No     Within the past 12 months, have you been humiliated or emotionally abused in other ways by your partner or ex-partner?: No   Stress: No Stress Concern Present (4/7/2025)    [place] [employer] - Occupational Stress Questionnaire     Feeling of Stress : Not at all   Social Connections: Unknown (4/7/2025)    Social  Connection and Isolation Panel [NHANES]     Frequency of Communication with Friends and Family: Not on file     Frequency of Social Gatherings with Friends and Family: Once a week     Attends Congregation Services: Not on file     Active Member of Clubs or Organizations: Not on file     Attends Club or Organization Meetings: Not on file     Marital Status: Not on file   Health Literacy: Not on file     Functional Status:  Prior to admission patient needed assistance:   Dependent ADLs:: Independent  Dependent IADLs:: Independent, Cleaning (monthly housekeeping service)     Mental Health Status:  Mental Health Status: No Current Concerns     Chemical Dependency Status:  Chemical Dependency Status: No Current Concerns           Values/Beliefs:  Spiritual, Cultural Beliefs, Congregation Practices, Values that affect care: yes          Values/Beliefs Comment: Confucianist    Discussed  Partnership in Safe Discharge Planning  document with patient/family: No    Additional Information:  Care management consulted for discharge planning and SW completed chart review. Per ED Provider notes, patient presented to the emergency department with right sided numbness. SW met with the patient and spouse to introduce self/role, confirm information in the chart, and discuss discharge planning process.     Patient resides in a home with 5 stairs inside and none outside to enter. He resides there with his significant other Carla. He was independent prior to this hospitalization and has a  come to the home once a month for deep cleaning. He reports he is very active at baseline and feels as though his mobility is at baseline today. Confirmed PCP and insurance as accurate in the chart. Discussed healthcare directive on the chart and he confirms that his sister Lia should be the primary contact. LUCIA explained that PT/OT will assess functioning and make recommendations for next steps. He could return home, maybe with [facility] if he has  continued therapy needs, or perhaps he would need placement for daily therapies.     VESTA Mejia, [facility]   Social Work   [facility]

## 2025-07-22 NOTE — PROGRESS NOTES
"   07/22/25 1101   Appointment Info   Signing Clinician's Name / Credentials (PT) Jennifer Melchor, SENTHIL   Student Supervision Direct supervision provided;Direct Patient Contact Provided   Living Environment   People in Home significant other   Current Living Arrangements house   Home Accessibility stairs within home   Number of Stairs, Within Home, Primary greater than 10 stairs  (7+6 to bedroom)   Stair Railings, Within Home, Primary railing on left side (ascending);railings safe and in good condition   Transportation Anticipated family or friend will provide   Living Environment Comments Pt lives in a townhoue with significant other. No BELEM. 7 stairs up to living room plus additional 6 stairs to bedroom. Drives at baseline, able to receive ride from signifiant other.   Self-Care   Usual Activity Tolerance excellent   Current Activity Tolerance good   Regular Exercise Yes   Activity/Exercise Type walking;strength training;biking   Exercise Amount/Frequency 3-5 times/wk   Equipment Currently Used at Home none   Fall history within last six months no   Activity/Exercise/Self-Care Comment Pt is IND with ADL and mobility at [facility]. Owns a SEC but does not use. Typically very active at baseline.   General Information   Onset of Illness/Injury or Date of Surgery 07/21/25   Referring Physician William Zee MD   Patient/Family Therapy Goals Statement (PT) return to activity   Pertinent History of Current Problem (include personal factors and/or comorbidities that impact the POC) per chart \"Saji Morrison is a 75 year old male admitted on 7/21/2025. He presents with right sided numbness to UE and LE. Patient also thought he had some slurred speech, unsteadiness, and noticed his right foot was dragging a bit when he walked\". 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 minor R plantarflexion weakness, otherwise LE bilaterally grossly 5/5   Bed Mobility   Comment, (Bed Mobility) supine>sit IND   Transfers   Comment, (Transfers) sit<>stand IND   Gait/Stairs (Locomotion)   Comment, (Gait/Stairs) ambulated ~500 for eval without AD and IND. Able to successfuly turn head x5 both sides while ambulating. No change in gait noted with fatigue. 4 stairs x 3 w/ unilateral handrail and step through pattern.   Balance   Balance Comments good seated and standing balance noted   Sensory Examination   Sensory Perception WFL   Sensory Perception Comments good sensation as observed from light touch assessment. pt report no changes   Coordination   Coordination other (see comments)   Coordination Comments RUE coordination mildly impaired as seen from finger to nose test. Good coordination bilaterally in LE evaluated with heel to shin and quick pf+df.   Clinical Impression   Criteria for Skilled Therapeutic Intervention Evaluation only   Clinical Presentation (PT Evaluation Complexity) stable   Clinical Presentation Rationale clinical judgement, [facility]   Clinical Decision Making (Complexity) low complexity   Risk & Benefits of therapy have been explained evaluation/treatment results reviewed;care plan/treatment goals reviewed;risks/benefits reviewed;current/potential barriers reviewed;participants voiced agreement with care plan;participants included;patient   PT Total Evaluation Time   PT Eval, Low Complexity Minutes (07276) 25   PT Discharge Planning   PT Discharge Recommendation (DC Rec) home with assist    PT Rationale for DC Rec Pt appears to be at/near baseline mobility levels. On evaluation was able to ambulate ~500' independently w/o assistive device, able to complete stairs safely and no LOB noted. Pt is typically IND at baseline and does not use an assistive device. Pt lives in a townhouse with significant other who can provide assistance as needed at home. Recommending PT discharge home with assist from spouse as needed. No further inpatient therapy needs.   PT Brief overview of current status IND w/ ambulation   PT Total Distance Amb During Session (feet) 500   Physical Therapy Time and Intention   Total Session Time (sum of timed and untimed services) 25

## 2025-07-22 NOTE — PROGRESS NOTES
[facility]    Medicine Progress Note - Hospitalist Service    Date of Admission:  7/21/2025    Assessment & Plan      Saji Morrison is a 75 year old male admitted on 7/21/2025. He presents with right sided numbness to UE and LE. Patient also thought he had some slurred speech, unsteadiness, and noticed his right foot was dragging a bit when he walked.       TIA (transient ischemic attack)    New onset atrial fibrillation (H)    Stenosis of right vertebral artery    Assessment: presents with right sided numbness to UE and LE. Patient also thought he had some slurred speech, unsteadiness, and noticed his right foot was dragging a bit when he walked. In ED head CT shows no intracranial hemorrhage, question of some small vessel ischemic disease.  Looks like there is a vertebral artery stenosis on the right.     Plan:   - Stroke neurology eval   -  mg x 1 -> daily aspirin 81 mg for secondary stroke prevention   - Neurochecks and Vital Signs every 4h   - Resume PTA Irbesartan  - Obtain ECHO -< oending   - Telemetry  - start Eliquis  - Brain MRI -> Acute lacunar infarct left basal ganglia and periventricular white matter. No hemorrhage.   - Follow vitals/temp  - Check lipid panel/A1c  - PT/OT/SLP  - Fall precautions      Hyperlipidemia LDL goal <130    Hypertension, unspecified type    Assessment/Plan: Hold prior to admission irbesartan at this time to allow permissive hypertension in the setting of possible CVA.  Continue prior to admission Lipitor.      Ankylosing spondylitis of sacral region (H)    Chronic back pain    Assessment/Plan: Treat symptomatically as needed.  Otherwise stable.      Dysuria    Lower urinary tract symptoms    Assessment/Plan: check UA -> not suggestive of active UTI. Urology eval as outpatient, suspect BPH          Diet: Regular Diet Adult    DVT Prophylaxis: DOAC  Ngo Catheter: Not present  Lines: None     Cardiac Monitoring: ACTIVE order. Indication:  "Stroke, acute (48 hours)  Code Status: Full Code      Clinically Significant Risk Factors Present on Admission                   # Hypertension: Noted on problem list           # Overweight: Estimated body mass index is 27.65 kg/m  as calculated from the following:    Height as of 6/23/25: 1.77 m (5' 9.69\").    Weight as of this encounter: 86.6 kg (191 lb).       # Financial/Environmental Concerns: none         Social Drivers of Health    Physical Activity: Insufficiently Active (4/7/2025)    Exercise Vital Sign     Days of Exercise per Week: 5 days     Minutes of Exercise per Session: 10 min   Social Connections: Unknown (4/7/2025)    Social Connection and Isolation Panel [NHANES]     Frequency of Social Gatherings with Friends and Family: Once a week          Disposition Plan     Medically Ready for Discharge: Anticipated Tomorrow             William Zee MD  Hospitalist Service  [facility]  Securely message with [employer] (more info)  Text page via [employer] Paging/Directory   ______________________________________________________________________    Interval History     Doing well  R sided weakness has resolved  No CP/SOB  No fevers  No slurred speech  No new complaints    Physical Exam   Vital Signs: Temp: 98.2  F (36.8  C) Temp src: Oral BP: (!) 162/105 Pulse: 93   Resp: 16 SpO2: 97 % O2 Device: None (Room air)    Weight: 191 lbs 0 oz    Constitutional: awake, alert, cooperative, no apparent distress.   Hematologic / Lymphatic: no cervical lymphadenopathy   Respiratory: CTABL   Cardiovascular: RRR with no m/r/g   GI: Normal bowel sounds, soft, non-distended, non-tender.   Skin: normal skin color, texture, turgor   Musculoskeletal: There is no redness, warmth, or swelling of the joints. Full range of motion noted.   Neurologic: Awake, alert, oriented to name, place and time. Amy. Motor is 5 out of 5 bilaterally. Sensory is intact.   Neuropsychiatric: normal mood and affect   "   ----------------------------------------------------------------------------------------    Medical Decision Making       35 MINUTES SPENT BY ME on the date of service doing chart review, history, exam, documentation & further activities per the note.      Data   ------------------------- PAST 24 HR DATA REVIEWED -----------------------------------------------    I have personally reviewed the following data over the past 24 hrs:    6.8  \   14.1   / 146 (L)     140 105 18.6 /  114 (H)   3.9 22 0.72 \     TSH: N/A T4: N/A A1C: N/A       Imaging results reviewed over the past 24 hrs:   Recent Results (from the past 24 hours)   MR Brain w/o & w Contrast    Narrative    EXAM: MR BRAIN W/O and W CONTRAST  LOCATION: [facility]  DATE: 7/21/2025    INDICATION: Transient episode of right sided symptoms  COMPARISON: CT 7/21/2025.  CONTRAST: 9mL Gadavist   TECHNIQUE: Routine multiplanar multisequence head MRI without and with intravenous contrast.    FINDINGS:  INTRACRANIAL CONTENTS: Approximately 2 x 1 cm acute lacunar infarct left basal ganglia and ventricular white matter. No mass, acute hemorrhage, or extra-axial fluid collections. Scattered nonspecific T2/FLAIR hyperintensities within the cerebral white   matter most consistent with mild chronic microvascular ischemic change. Mild to moderate generalized cerebral atrophy. No hydrocephalus. Normal position of the cerebellar tonsils. No pathologic contrast enhancement.    SELLA: No abnormality accounting for technique.    OSSEOUS STRUCTURES/SOFT TISSUES: Normal marrow signal. The major intracranial vascular flow voids are maintained.     ORBITS: No abnormality accounting for technique.     SINUSES/MASTOIDS: No paranasal sinus mucosal disease. No middle ear or mastoid effusion.       Impression    IMPRESSION:  1.  Acute lacunar infarct left basal ganglia and periventricular white matter. No hemorrhage.       ------------------------- ENCOUNTER  LABS ----------------------------------------------------------------  Recent Labs   Lab 07/22/25  1134 07/22/25 0732 07/22/25 0714 07/21/25 2244 07/21/25  1012   WBC  --   --  6.8  --  6.7   HGB  --   --  14.1  --  14.1   MCV  --   --  92  --  93   PLT  --   --  146*  --  150   NA  --   --  140  --  140   POTASSIUM  --   --  3.9  --  4.4   CHLORIDE  --   --  105  --  106   CO2  --   --  22 -- 24   BUN  --   --  18.6  --  19.7   CR  --   --  0.72  --  0.88   ANIONGAP  --   --  13  --  10   RAS  --   --  9.2  --  9.5   * 106* 95   < > 102*   ALBUMIN  --   --   --   --  4.3   PROTTOTAL  --   --   --   --  7.4   BILITOTAL  --   --   --   --  0.8   ALKPHOS  --   --   --   --  126   ALT  --   --   --   --  18   AST  --   --   --   --  21    < > = values in this interval not displayed.       Most Recent 3 CBC's:  Recent Labs   Lab Test 07/22/25  0714 07/21/25  1012 04/10/25  0916   WBC 6.8 6.7 6.3   HGB 14.1 14.1 14.2   MCV 92 93 93   * 150 137*     Most Recent 3 BMP's:  Recent Labs   Lab Test 07/22/25 1134 07/22/25  0732 07/22/25 0714 07/21/25 2244 07/21/25  1012 04/10/25  0916   NA  --   --  140  --  140 142   POTASSIUM  --   --  3.9  --  4.4 4.2   CHLORIDE  --   --  105  --  106 102   CO2  --   --  22 -- 24 23   BUN  --   --  18.6  --  19.7 19.6   CR  --   --  0.72  --  0.88 0.88   ANIONGAP  --   --  13  --  10 17*   RAS  --   --  9.2  --  9.5 9.5   * 106* 95   < > 102* 101*    < > = values in this interval not displayed.     Most Recent 2 LFT's:  Recent Labs   Lab Test 07/21/25  1012 04/10/25  0916   AST 21 41   ALT 18 20   ALKPHOS 126 103   BILITOTAL 0.8 1.0     Most Recent 3 INR's:No lab results found.  Most Recent 3 Troponin's:No lab results found.  Most Recent 3 BNP's:No lab results found.  Most Recent D-dimer:No lab results found.  Most Recent Cholesterol Panel:  Recent Labs   Lab Test 07/21/25  1012   CHOL 177   LDL 83   HDL 74   TRIG 98     Most Recent 6 Bacteria Isolates From Any  Culture (See EPIC Reports for Culture Details):  Recent Labs   Lab Test 04/12/19  1201 07/02/18  1627   CULT Moderate growth  Normal skin jam    Light growth  Staphylococcus lugdunensis  * <10,000 colonies/mL  urogenital jam       Most Recent TSH and T4:  Recent Labs   Lab Test 07/06/21  1136   TSH 1.89     Most Recent Hemoglobin A1c:  Recent Labs   Lab Test 07/21/25  1012   A1C 5.5     Most Recent Urinalysis:  Recent Labs   Lab Test 07/21/25  2117 07/02/18  1627   COLOR Light Yellow Yellow   APPEARANCE Clear Clear   URINEGLC Negative Negative   URINEBILI Negative Small*   URINEKETONE Negative Negative   SG 1.029 >1.030   UBLD Negative Large*   URINEPH 7.0 5.5   PROTEIN 10* 100*   UROBILINOGEN  --  2.0*   NITRITE Negative Negative   LEUKEST Negative Negative   RBCU 1 25-50*   WBCU <1 0 - 5     Most Recent ESR & CRP:No lab results found.
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
## 2025-07-22 NOTE — PROGRESS NOTES
Reason for Admission: RUE/RLE weakness, TIA    Cognitive/Mentation: A/Ox 4  Neuros/CMS: Intact   VS: BP <220, new onset afib.   Tele: Afib CVR.  /GI: Continent  Pulmonary: LS Clear.  Pain: denies.     Drains/Lines: PIV, SL  Skin: intact  Activity: Independent.  Diet: Regular with thin liquids. Takes pills Whole.     Therapies recs: home w/ assist  Discharge: pending    Aggression Stoplight Tool: Green    End of shift summary: pt stable, therapies recommending home w/ assist , waiting for discharge instructions.

## 2025-07-22 NOTE — DISCHARGE INSTRUCTIONS
Your risk factors for stroke or TIA (transient ischemic attack):     Your Risk Factors Your Results Goals   [x] High blood pressure BP: (!) 141/86 (07/23/25 1552) Less than 120/80   [x] Cholesterol          Total 7/21/2025: 177 mg/dL   Less than 150    Triglycerides   7/21/2025: 98 mg/dL Less than 150    LDL 7/21/2025: 83 mg/dL    Less than 70    HDL 7/21/2025: 74 mg/dL         Greater than 40 (men)  Greater than 50 (women)   [] Diabetes                A1C 7/21/2025: 5.5 % Less than 5.7   [x] Atrial fibrillation Atrial fibrillation noted on cardiac monitor Manage per physician orders   [x] Smoking/tobacco use   Tobacco Use      Smoking status: Never        Passive exposure: Never      Smokeless tobacco: Never   Quit smoking and tobacco   [x] Overweight Body mass index is 27.65 kg/m .  Less than 25     Other risk factors include: carotid (neck) artery disease, other heart diseases, prior stroke or TIA, poor diet, lack of exercise, and excessive alcohol consumption.     [x] Written stroke educational materials given to patient including:   - Learning about BE FAST: Stroke Warning Signs and Learning about Risk Factors for Stroke (Healthwise)   - Understanding Stroke: Key Resources After a Stroke (FOD #984847)       Know the warning signs and symptoms of stroke: BE FAST     B = Balance loss   E = Eyesight changes   F = Facial droop or numbness   A = Arm or leg weakness   S = Speech difficulty, slurred speech   T = Time to call 911 for help

## 2025-07-22 NOTE — CONSULTS
"      [facility]    Vascular Neurology Progress Note    Interval History     SBP range 152-190  Afebrile    Please see consult note from 07/21 for further details.     Today, Saji is accompanied by his partner, Carla. Saji reports that he has no headache, numbness, vision or speech changes. He continues to have frequent and increased urine output and symptoms were communicated with primary team. At baseline, Saji reports regular exercise daily. He states drinking 2 shots of alcohol per day and 1 cigar/week for the past 5 years.    Hospital Course     Chief complaint: Hypertension and Stroke Symptoms    Saji Morrison is a 75 year old left handed male who with prior episode of \"feeling off balance\", slurred speech and right upper and lower extremity weakness two days ago. On 07/19, he reports right arm and leg weakness that lasted for 4 hours. He was at festival and was walking and experienced a tingling sensation. His partner reports an episode of slurred speech simultaneously with the right sided weakness that resolved in 4 hours as well. While walking up the stairs, Saji reports \"feeling off balance.\" On 07/20, he experienced a similar episode of right arm tingling for about 4 hours, but not in his right leg. He completed his regular routine and messaged his PCP who recommended ED evaluation.     PMH includes HLD, HTN, ankylosing spondylitis. PTA medication include Lipitor 10 mg with no missed doses.     Assessment and Plan     1.) Acute ischemic stroke of left basal ganglia/periventricular infarct likely due to small-vessel disease due to risk factors of HTN and HLD, subcortical location and size of infarct. Symptoms of right sided weakness localize with location of stroke. However, frequency of urination is unlikely explained by location of stroke.     2.) New onset atrial fibrillation. Saji reports 1st episode occurred in 10/2024 during his cataract surgery. Not on PTA " "anticoagulation.     Recommendations   - Neuro checks and vital signs every 4 hours  - Inpatient BP goal <130/80 since it has been 24 hours+ since symptom onset  - Recommend resuming BP medications    - Long term outpatient goal BP is <130/80 to be achieved as outpatient within several weeks. Tighter control associated with improved vascular outcomes; recommend home blood pressure monitoring and keeping a BP log for primary care follow up  - Stop aspirin   - Initiate Eliquis per primary team   - Appreciate pharmacy liaison (ordered)   - LDL not within goal 40-70; continue PTA Lipitor 10 mg with ongoing outpatient titration to achieve goal  - Hgb A1c within goal <7%   - Encourage Mediterranean diet and daily exercise  - Appreciate primary team and PT/OT/SLP consults  - Bedside Glucose Monitoring  - Encourage smoking cessation  - Encourage decreasing daily alcohol intake  - Euthermia, Euglycemia   - Education: Reviewed [facility] stroke warning signs    Diagnostic testing  - Continue telemetry while inpatient  - Awaiting echocardiogram    DVT prophylaxis:  SCDs     Patient Follow-up    - final recommendation pending work-up    Thank you for this consult. We will continue to follow for TTE results. If results are normal, no further stroke workup recommended.     Evelyne De Leon PA-C  Vascular Neurology    To page me or covering stroke neurology team member, click here: AMCOM  Choose \"On Call\" tab at top, then select \"NEUROLOGY/ALL SITES\" from middle drop-down box, press Enter, then look for \"stroke\" or \"telestroke\" for your site.    Physical Examination     Temp: 97.9  F (36.6  C) Temp src: Oral BP: (!) 164/114 Pulse: 87   Resp: 16 SpO2: 96 % O2 Device: None (Room air)      General Exam  General:  patient lying in bed without any acute distress    HEENT:  normocephalic/atraumatic  Pulmonary:  no respiratory distress    Neuro Exam  Mental Status:  alert, oriented to age, month and self, follows commands, speech clear and " fluent, naming and repetition normal  Cranial Nerves:  visual fields intact, EOMI with normal smooth pursuit, facial sensation intact and symmetric, facial movements symmetric, hearing not formally tested but intact to conversation, no dysarthria, tongue protrusion midline  Motor:  no abnormal movements, able to move all limbs spontaneously, strength 5/5 throughout upper and lower extremities, no pronator drift  Reflexes:  not tested   Sensory:  light touch sensation intact and symmetric throughout upper and lower extremities, no extinction on double simultaneous stimulation   Coordination:  normal finger-to-nose and heel-to-shin bilaterally without dysmetria  Station/Gait:  deferred    Stroke Scales       NIHSS  1a. Level of Consciousness 0-->Alert, keenly responsive   1b. LOC Questions 0-->Answers both questions correctly   1c. LOC Commands 0-->Performs both tasks correctly   2.   Best Gaze 0-->Normal   3.   Visual 0-->No visual loss   4.   Facial Palsy 0-->Normal symmetrical movements   5a. Motor Arm, Left 0-->No drift, limb holds 90 (or 45) degrees for full 10 secs   5b. Motor Arm, Right 0-->No drift, limb holds 90 (or 45) degrees for full 10 secs   6a. Motor Leg, Left 0-->No drift, leg holds 30 degree position for full 5 secs   6b. Motor Leg, right 0-->No drift, leg holds 30 degree position for full 5 secs   7.   Limb Ataxia 0-->Absent   8.   Sensory 0-->Normal, no sensory loss   9.   Best Language 0-->No aphasia, normal   10. Dysarthria 0-->Normal   11. Extinction and Inattention  0-->No abnormality   Total 0 (07/22/25 1000)       Imaging/Labs   (Bolded imaging and labs new and/or personally reviewed or re-reviewed by me today)    MRI/Head CT Acute/subacute left basal ganglia/periventricular infarct seen on DWI, ADC and FLAIR   Intracranial Vasculature CTA head: No LVO, left SONJA stenoses, bilateral carotid siphon atherosclerotic disease    Cervical Vasculature CTA neck: Moderate stenoses of the right vertebral  artery origin     Echocardiogram Ordered    EKG/Telemetry Atrial fibrillation, Abnormal ECG    Other Testing Not Applicable      LDL  7/21/2025: 83 mg/dL   A1C  7/21/2025: 5.5 %   Troponin No lab value available in past 48 hrs     Other labs reviewed by me today:  N/A    Time Spent on this Encounter   Billing: I have personally spent a total of 60 minutes providing care today, time spent in reviewing medical records and devising the plan as recorded above.

## 2025-07-22 NOTE — PROVIDER NOTIFICATION
MD Notification    Notified Person: MD    Notified Person Name: EDWINA Pascual    Notification Date/Time: 2240 7/21    Notification Interaction: Raymundo    Purpose of Notification: MRI results    Orders Received:    Comments:

## 2025-07-22 NOTE — PROGRESS NOTES
"Brief Stroke Note    RN paged MRI back, shows small acute infarct correlating with patient's right-sided symptoms. Recommendations as below. Pt does not meet criteria for DAPT per POINT/CHANCE criteria given symptoms >2 days duration.    Recommendations  - Use orderset: \"Ischemic Stroke Routine Admission\" or \"Ischemic Stroke No Thrombolytics/No Thrombectomy ICU Admission\"  - Place Neurology IP Stroke Consult order   - Neurochecks and Vital Signs every 4h   - Permissive HTN; goal SBP < 220 mmHg  - Daily aspirin 81 mg for secondary stroke prevention  - Statin: pending LDL  - TTE (with Bubble Study if age 60 yrs or less)  - Telemetry, EKG  - Bedside Glucose Monitoring  - A1c, Lipid Panel, Troponin x 3  - PT/OT/SLP  - Stroke Education  - Euthermia, Euglycemia  - Cardiac monitoring    My recommendations are based on the information provided over the phone by Saji Morrison's in-person providers. They are not intended to replace the clinical judgment of his in-person providers. I was not requested to personally see or examine the patient at this time.     Arminda Pascual MD  Vascular Neurology    To page me or covering stroke neurology team member, click here: AMCOM  Choose \"On Call\" tab at top, then select \"NEUROLOGY/ALL SITES\" from middle drop-down box, press Enter, then look for \"stroke\" or \"telestroke\" for your site.           "

## 2025-07-22 NOTE — PLAN OF CARE
Goal Outcome Evaluation:    [id number]       Reason for Admission: RUE/RLE weakness, TIA    Cognitive/Mentation: A/Ox 4  Neuros/CMS: Intact ex slight slurred speech  VS: BP (!) 164/117 (BP Location: Left arm)   Pulse 91   Temp 98.2  F (36.8  C) (Oral)   Resp 16   Wt 86.6 kg (191 lb)   SpO2 97%   BMI 27.65 kg/m  .   Tele: Afib CVR.  /GI: Continent.   Pulmonary: LS clear.  Pain: Denies.     Drains/Lines: PIV, SL  Skin: intact  Activity: Assist x 1 with stand by assist.  Diet: Regular with thin liquids. Takes pills whole.     Therapies recs: pending  Discharge: pending    Aggression Stoplight Tool: Green    End of shift summary: MRI completed. Patient drank 30ml of water without issue.

## 2025-07-22 NOTE — PLAN OF CARE
SLP: Orders received. Chart reviewed and discussed with care team. Patient passed the swallow screen and is tolerating a regular diet and thin liquids. Voice and speech are minimally to mildly impaired and would benefit from an OP speech speech/voice evaluation. Defer discharge recommendations to the medical team.  Will complete orders IP orders.

## 2025-07-22 NOTE — CONSULTS
Patient has Medicare Advantage through [facility].    Xarelto/Eliquis  --Upon receipt of RX, Discharge Pharmacy can provide 1 mo free using one-time  voucher.  --Patient is will pay 100% of the first $300 in drug costs ($300 remains; first month will be as much as $361)  --Subsequent fills will be $124/mo.    Dabigatran (prices may vary by pharmacy)  --Patient is will pay 100% of the first $300 in drug costs (fills will be $161/mo until fulfilled)  --Subsequent fills will be $68/mo.    Jantoven (warfarin):  $0.    If/when total out of pocket drug costs exceed $2000, patient will pay $0 for all covered drugs for the remainder of the year.    If patient cannot afford deductible, I recommend patient consider participating in the Medicare Prescription Payment Plan, which would spread drug costs more evenly throughout the year.  Patient may enroll in this program by calling the member services phone number for their plan.      Esther Fuentes  Pharmacy Technician/Liaison, Discharge Pharmacy   [phone] (voice or text)  [email or web address]  Pharmacy test claims are estimates and may not reflect final costs.   Suggested alternatives aim to be cost-effective but may not be therapeutically equivalent as this consult is informational and does not constitute medical advice.   Clinical decisions should be made by qualified healthcare providers.

## 2025-07-22 NOTE — PLAN OF CARE
Goal Outcome Evaluation:      Plan of Care Reviewed With: patient      Patient slept all night VSS RA neuro intact. Continue to monitor.

## 2025-07-22 NOTE — PLAN OF CARE
OT: Order received, chart reviewed and discussed with care team. OT not indicated due to per discussion w/ physical therapy it is near functional baseline, he has a walk in shower and was able to perform BADLs during PT session. Defer discharge recommendations to current care team. Will complete orders.    Of note: Pt indicated to PT that he sometimes types the wrong letter when texting, if this persists over the next 1-2 weeks and does not resolve w/ time then pt could follow up w/ OP OT as necessary. No IP OT needs at this time.

## 2025-07-23 VITALS
WEIGHT: 191 LBS | SYSTOLIC BLOOD PRESSURE: 141 MMHG | RESPIRATION RATE: 16 BRPM | BODY MASS INDEX: 27.65 KG/M2 | OXYGEN SATURATION: 99 % | TEMPERATURE: 97.8 F | HEART RATE: 96 BPM | DIASTOLIC BLOOD PRESSURE: 86 MMHG

## 2025-07-23 LAB
INR PPP: 1.18 (ref 0.85–1.15)
PROTHROMBIN TIME: 14.8 SECONDS (ref 11.8–14.8)

## 2025-07-23 PROCEDURE — 250N000013 HC RX MED GY IP 250 OP 250 PS 637: Performed by: STUDENT IN AN ORGANIZED HEALTH CARE EDUCATION/TRAINING PROGRAM

## 2025-07-23 PROCEDURE — 99239 HOSP IP/OBS DSCHRG MGMT >30: CPT | Performed by: STUDENT IN AN ORGANIZED HEALTH CARE EDUCATION/TRAINING PROGRAM

## 2025-07-23 PROCEDURE — 250N000011 HC RX IP 250 OP 636: Performed by: INTERNAL MEDICINE

## 2025-07-23 PROCEDURE — 36415 COLL VENOUS BLD VENIPUNCTURE: CPT | Performed by: STUDENT IN AN ORGANIZED HEALTH CARE EDUCATION/TRAINING PROGRAM

## 2025-07-23 PROCEDURE — 85610 PROTHROMBIN TIME: CPT | Performed by: STUDENT IN AN ORGANIZED HEALTH CARE EDUCATION/TRAINING PROGRAM

## 2025-07-23 PROCEDURE — 99207 PR NO BILLABLE SERVICE THIS VISIT: CPT

## 2025-07-23 RX ORDER — LABETALOL HYDROCHLORIDE 5 MG/ML
10 INJECTION, SOLUTION INTRAVENOUS EVERY 4 HOURS PRN
Status: DISCONTINUED | OUTPATIENT
Start: 2025-07-23 | End: 2025-07-23 | Stop reason: HOSPADM

## 2025-07-23 RX ORDER — HYDRALAZINE HYDROCHLORIDE 20 MG/ML
10 INJECTION INTRAMUSCULAR; INTRAVENOUS EVERY 4 HOURS PRN
Status: DISCONTINUED | OUTPATIENT
Start: 2025-07-23 | End: 2025-07-23

## 2025-07-23 RX ORDER — AMLODIPINE BESYLATE 10 MG/1
10 TABLET ORAL DAILY
Status: DISCONTINUED | OUTPATIENT
Start: 2025-07-23 | End: 2025-07-23 | Stop reason: HOSPADM

## 2025-07-23 RX ORDER — WARFARIN SODIUM 7.5 MG/1
7.5 TABLET ORAL
Status: DISCONTINUED | OUTPATIENT
Start: 2025-07-23 | End: 2025-07-23 | Stop reason: HOSPADM

## 2025-07-23 RX ORDER — METOPROLOL TARTRATE 25 MG/1
25 TABLET, FILM COATED ORAL 2 TIMES DAILY
Status: DISCONTINUED | OUTPATIENT
Start: 2025-07-23 | End: 2025-07-23 | Stop reason: HOSPADM

## 2025-07-23 RX ORDER — METOPROLOL TARTRATE 25 MG/1
25 TABLET, FILM COATED ORAL 2 TIMES DAILY
Qty: 120 TABLET | Refills: 0 | Status: SHIPPED | OUTPATIENT
Start: 2025-07-23 | End: 2025-07-29

## 2025-07-23 RX ORDER — AMLODIPINE BESYLATE 10 MG/1
10 TABLET ORAL DAILY
Qty: 60 TABLET | Refills: 0 | Status: SHIPPED | OUTPATIENT
Start: 2025-07-24 | End: 2025-07-29

## 2025-07-23 RX ADMIN — METOPROLOL TARTRATE 25 MG: 25 TABLET, FILM COATED ORAL at 08:29

## 2025-07-23 RX ADMIN — AMLODIPINE BESYLATE 10 MG: 10 TABLET ORAL at 04:37

## 2025-07-23 RX ADMIN — AMLODIPINE BESYLATE 10 MG: 10 TABLET ORAL at 08:28

## 2025-07-23 RX ADMIN — HYDRALAZINE HYDROCHLORIDE 10 MG: 20 INJECTION INTRAMUSCULAR; INTRAVENOUS at 06:20

## 2025-07-23 RX ADMIN — ATORVASTATIN CALCIUM 10 MG: 10 TABLET, FILM COATED ORAL at 08:28

## 2025-07-23 RX ADMIN — IRBESARTAN 300 MG: 75 TABLET ORAL at 08:28

## 2025-07-23 ASSESSMENT — ACTIVITIES OF DAILY LIVING (ADL)
ADLS_ACUITY_SCORE: 24

## 2025-07-23 NOTE — PROVIDER NOTIFICATION
MD Notification    Notified Person: MD    Notified Person Name: Dr. Zee    Notification Date/Time: 7/23 0745    Notification Interaction: vocera    Purpose of Notification: Pt BP continued to be elevated overnight. Received PRN amlodipine and hydralazine early this AM. Wondering if any adjustments to be made to scheduled meds? Also pt intermittently Afib -120s at rest.     Orders Received: meds adjusted.

## 2025-07-23 NOTE — PLAN OF CARE
Reason for Admission: L CVA    Cognitive/Mentation: A/Ox 4  Neuros/CMS: Intact   VS: stable on RA ex HTN.   Tele: Afib CVR.  /GI: Continent.   Pulmonary: LS clear.  Pain: denies.     Drains/Lines: PIV SL  Skin: WNL  Activity: IND  Diet: Regular with thin liquids. Takes pills whole.     Therapies recs: home  Discharge: pending    Aggression Stoplight Tool: green    End of shift summary: stable, first dose of warfarin tonight, pt hopeful to discharge home tomorrow.

## 2025-07-23 NOTE — PROGRESS NOTES
MD Notification    Notified Person: MD    Notified Person Name: Zenaida Velez    Notification Date/Time: 07/23/25, 0539    Notification Interaction: Vocera    Purpose of Notification: High BP after PRN    Orders Received: Hydralazine. See MAR.    Comments:

## 2025-07-23 NOTE — PROGRESS NOTES
Brief Stroke Note:     Review of TTE showed EF 55%, aortic root dilation 4.0 cm and ascending aorta aneurysm at 4.8 cm. Bilateral mild-moderate enlargement, right ventricle normal size and function. No further stroke workup recommended. Outpatient stroke follow up ordered. Please contact stroke team for any questions.     Thank you,        Evelyne De Leon PA-C  Vascular Neurology

## 2025-07-23 NOTE — PHARMACY-ANTICOAGULATION SERVICE
Clinical Pharmacy - Warfarin Dosing Consult     Pharmacy has been consulted to manage this patient s warfarin therapy.  Indication: Atrial Fibrillation  Therapy Goal: INR 2-3  Warfarin Prior to Admission: No  Significant drug interactions: none significant    INR   Date Value Ref Range Status   07/22/2025 1.25 (H) 0.85 - 1.15 Final       Recommend warfarin 5 mg today.  Pharmacy will monitor Saji Morrison daily and order warfarin doses to achieve specified goal.      Please contact pharmacy as soon as possible if the warfarin needs to be held for a procedure or if the warfarin goals change.

## 2025-07-23 NOTE — PLAN OF CARE
Date/Time: 07/22/25, 2300 - 0700    Reason for Admission: L CVA    Cognitive/Mentation: Alert and oriented x4.  Neuros/CMS: Intact    VS: VSS ex HTN. Norvasc and hydralazine given x1 each. On RA.  Tele: Afib CVR.  GI/: Continent.  Pulmonary: LS clear.  Pain: Denies pain.     Drains/Lines: PIV SL  Skin: Intact  Activity: Independent  Diet: Regular with thin liquids. Takes pills whole.     Therapies recs: Home  Discharge: Pending    Aggression Stoplight Tool: Green    End of shift summary: No issue overnight. Continue with plan of care.

## 2025-07-24 ENCOUNTER — PATIENT OUTREACH (OUTPATIENT)
Dept: CARE COORDINATION | Facility: CLINIC | Age: 75
End: 2025-07-24
Payer: COMMERCIAL

## 2025-07-24 NOTE — PROGRESS NOTES
Clinic Care Coordination Contact  Transitions of Care Outreach  Chief Complaint   Patient presents with    Clinic Care Coordination - Post Hospital       Most Recent Admission Date: 7/21/2025   Most Recent Admission Diagnosis: TIA (transient ischemic attack) - G45.9  New onset atrial fibrillation (H) - I48.91  Stenosis of right vertebral artery - I65.01  Hypertension, unspecified type - I10     Most Recent Discharge Date: 7/23/2025   Most Recent Discharge Diagnosis: New onset atrial fibrillation (H) - I48.91  TIA (transient ischemic attack) - G45.9  Hypertension, unspecified type - I10  Stenosis of right vertebral artery - I65.01  Other specified counseling - Z71.89  Cerebrovascular accident (CVA), unspecified mechanism (H) - I63.9     Transitions of Care Assessment    Discharge Assessment  How are you doing now that you are home?: Pretty good.  How are your symptoms? (Red Flag symptoms escalate to triage hotline per guidelines): Improved  Do you know how to contact your clinic care team if you have future questions or changes to your health status? : Yes  Does the patient have their discharge instructions? : Yes  Does the patient have questions regarding their discharge instructions? : No  Were you started on any new medications or were there changes to any of your previous medications? : Yes  Does the patient have all of their medications?: Yes  Do you have questions regarding any of your medications? : No  Do you have all of your needed medical supplies or equipment (DME)?  (i.e. oxygen tank, CPAP, cane, etc.): Yes         Post-op (Clinicians Only)  Did the patient have surgery or a procedure: No  Fever: No  Chills: No  Eating & Drinking: eating and drinking without complaints/concerns  PO Intake: regular diet  Additional Symptoms:  (NA)  Bowel Function: normal  Date of last BM: 07/24/25  Urinary Status: voiding without complaint/concerns    Care Management   None    Follow up Plan     Patient declined Care  Coordination services at this time.   Patient is aware of how to initiate Care Coordination services with the clinic in the future.     Discharge Follow-Up  Discharge follow up appointment scheduled in alignment with recommended follow up timeframe or Transitions of Risk Category? (Low = within 30 days; Moderate= within 14 days; High= within 7 days): Yes  Discharge Follow Up Appointment Date: 07/29/25  Discharge Follow Up Appointment Scheduled with?: Primary Care Provider    Future Appointments   Date Time Provider Department Center   7/29/2025 10:30 AM Yared Gallardo MD [facility]       Outpatient Plan as outlined on AVS reviewed with patient.    For any urgent concerns, please contact our 24 hour nurse triage line: [phone] ([phone])       Corina Fitzpatrick RN

## 2025-07-29 ENCOUNTER — ORDERS ONLY (AUTO-RELEASED) (OUTPATIENT)
Dept: INTERNAL MEDICINE | Facility: CLINIC | Age: 75
End: 2025-07-29

## 2025-07-29 ENCOUNTER — OFFICE VISIT (OUTPATIENT)
Dept: INTERNAL MEDICINE | Facility: CLINIC | Age: 75
End: 2025-07-29
Payer: COMMERCIAL

## 2025-07-29 ENCOUNTER — TELEPHONE (OUTPATIENT)
Dept: OTHER | Facility: CLINIC | Age: 75
End: 2025-07-29

## 2025-07-29 VITALS
HEART RATE: 68 BPM | SYSTOLIC BLOOD PRESSURE: 122 MMHG | OXYGEN SATURATION: 100 % | HEIGHT: 70 IN | BODY MASS INDEX: 26.21 KG/M2 | WEIGHT: 183.1 LBS | RESPIRATION RATE: 17 BRPM | DIASTOLIC BLOOD PRESSURE: 80 MMHG | TEMPERATURE: 97.7 F

## 2025-07-29 DIAGNOSIS — G45.9 TIA (TRANSIENT ISCHEMIC ATTACK): ICD-10-CM

## 2025-07-29 DIAGNOSIS — N39.9 URINARY DISORDER: ICD-10-CM

## 2025-07-29 DIAGNOSIS — Z09 HOSPITAL DISCHARGE FOLLOW-UP: Primary | ICD-10-CM

## 2025-07-29 DIAGNOSIS — I65.01 STENOSIS OF RIGHT VERTEBRAL ARTERY: ICD-10-CM

## 2025-07-29 DIAGNOSIS — I48.91 NEW ONSET ATRIAL FIBRILLATION (H): ICD-10-CM

## 2025-07-29 DIAGNOSIS — M45.8 ANKYLOSING SPONDYLITIS OF SACRAL REGION (H): ICD-10-CM

## 2025-07-29 PROCEDURE — 99495 TRANSJ CARE MGMT MOD F2F 14D: CPT | Performed by: INTERNAL MEDICINE

## 2025-07-29 PROCEDURE — 3074F SYST BP LT 130 MM HG: CPT | Performed by: INTERNAL MEDICINE

## 2025-07-29 PROCEDURE — 1111F DSCHRG MED/CURRENT MED MERGE: CPT | Performed by: INTERNAL MEDICINE

## 2025-07-29 PROCEDURE — 3079F DIAST BP 80-89 MM HG: CPT | Performed by: INTERNAL MEDICINE

## 2025-07-29 RX ORDER — METOPROLOL TARTRATE 25 MG/1
25 TABLET, FILM COATED ORAL 2 TIMES DAILY
Qty: 180 TABLET | Refills: 1 | Status: SHIPPED | OUTPATIENT
Start: 2025-07-29

## 2025-07-29 RX ORDER — AMLODIPINE BESYLATE 10 MG/1
10 TABLET ORAL DAILY
Qty: 90 TABLET | Refills: 3 | Status: SHIPPED | OUTPATIENT
Start: 2025-07-29

## 2025-07-29 NOTE — PROGRESS NOTES
Assessment & Plan     Hospital discharge follow-up  Post discharge.  Following up as directed.  - amLODIPine (NORVASC) 10 MG tablet; Take 1 tablet (10 mg) by mouth daily.    TIA (transient ischemic attack)  No focal neurologic change at present time.  Speech fluent.  Patient is not doing PT and OT.  Continue with anticoagulation and follow-up with neurology as previously ordered.  - apixaban ANTICOAGULANT (ELIQUIS ANTICOAGULANT) 5 MG tablet; Take 1 tablet (5 mg) by mouth 2 times daily.    Advised patient to dose 2 of his blood pressure meds in the a.m. and p.m. to try to get better blood pressure control early a.m.    Stenosis of right vertebral artery  Discussed results with patient.  Seeing neurology upon follow-up.  Discharge summary appears that they are continuing to manage this medically.    New onset atrial fibrillation (H)  Rate controlled on therapy.  Continue with beta-blocker therapy.  Suggest Zio patch to determine A-fib burden.  Follow-up with cardiology.  No changes to anticoagulation therapy  - apixaban ANTICOAGULANT (ELIQUIS ANTICOAGULANT) 5 MG tablet; Take 1 tablet (5 mg) by mouth 2 times daily.  - metoprolol tartrate (LOPRESSOR) 25 MG tablet; Take 1 tablet (25 mg) by mouth 2 times daily.  - Adult Cardiology Eval  Referral; Future  - ZIO PATCH MAIL OUT; Future    Ankylosing spondylitis of sacral region (H)  Ordered as baseline history.    Urinary disorder  Placed per patient request for ongoing symptoms BPH    - Adult Urology  Referral; Future  MED REC REQUIRED  Post Medication Reconciliation Status: discharge medications reconciled, continue medications without change      Luis Alberto Lennon is a 75 year old, presenting for the following health issues:  Hospital F/U    HPI          7/24/2025   Post Discharge Outreach   How are you doing now that you are home? Pretty good.   How are your symptoms? (Red Flag symptoms escalate to triage hotline per guidelines) Improved   Does the  patient have their discharge instructions?  Yes   Does the patient have questions regarding their discharge instructions?  No   Were you started on any new medications or were there changes to any of your previous medications?  Yes   Does the patient have all of their medications? Yes   Do you have questions regarding any of your medications?  No   Do you have all of your needed medical supplies or equipment (DME)?  (i.e. oxygen tank, CPAP, cane, etc.) Yes   Discharge Follow Up Appointment Date 7/29/2025   Discharge Follow Up Appointment Scheduled with? Primary Care Provider       Hospital Follow-up Visit:    Hospital/Nursing Home/IP Rehab Facility: [facility]  Most Recent Admission Date: 7/21/2025   Most Recent Admission Diagnosis: TIA (transient ischemic attack) - G45.9  New onset atrial fibrillation (H) - I48.91  Stenosis of right vertebral artery - I65.01  Hypertension, unspecified type - I10     Most Recent Discharge Date: 7/23/2025   Most Recent Discharge Diagnosis: New onset atrial fibrillation (H) - I48.91  TIA (transient ischemic attack) - G45.9  Hypertension, unspecified type - I10  Stenosis of right vertebral artery - I65.01  Other specified counseling - Z71.89  Cerebrovascular accident (CVA), unspecified mechanism (H) - I63.9   Do you have any other stressors you would like to discuss with your provider? No    Problems taking medications regularly:  None  Medication changes since discharge: None  Problems adhering to non-medication therapy:  None    Summary of hospitalization:  [facility] discharge summary reviewed  Diagnostic Tests/Treatments reviewed.  Follow up needed: as ordered  Other Healthcare Providers Involved in Patient s Care:         None  Update since discharge: stable.         Plan of care communicated with patient           TIA (transient ischemic attack)    New onset atrial fibrillation (H)    Stenosis of right vertebral artery    Assessment:  presents with right sided numbness to UE and LE. Patient also thought he had some slurred speech, unsteadiness, and noticed his right foot was dragging a bit when he walked. In ED head CT shows no intracranial hemorrhage, question of some small vessel ischemic disease.  Looks like there is a vertebral artery stenosis on the right.     Plan:   - Stroke neurology eval   -  mg x 1 -> daily aspirin 81 mg for secondary stroke prevention   - Resume PTA Irbesartan  - Obtain ECHO -< Left ventricular systolic function is normal. The visual ejection fraction is estimated at ~55%%.  No regional wall motion abnormalities noted. Aortic root dilatation is present, 4.0 cm. Ascending aorta aneurysm is present, 4.8 cm. No hemodynamically significant valvular disease.   - start Eliquis for stroke prevention  - Brain MRI -> Acute lacunar infarct left basal ganglia and periventricular white matter. No hemorrhage.   - Follow vitals/temp  - PT/OT/SLP  - Fall precautions       Hyperlipidemia LDL goal <130    Hypertension, unspecified type    Assessment/Plan: Resume prior to admission irbesartan. Continue prior to admission Lipitor.       Ankylosing spondylitis of sacral region (H)    Chronic back pain    Assessment/Plan: Treat symptomatically as needed.  Otherwise stable.       Dysuria    Lower urinary tract symptoms   Assessment/Plan: check UA -> not suggestive of active UTI. Urology eval as outpatient, suspect BPH       Review of Systems  CONSTITUTIONAL: NEGATIVE for fever, chills, change in weight  EYES: NEGATIVE for vision changes or irritation  ENT/MOUTH: NEGATIVE for ear, mouth and throat problems  RESP: NEGATIVE for significant cough or SOB  CV: NEGATIVE for chest pain, palpitations or peripheral edema  GI: NEGATIVE for nausea, abdominal pain, heartburn, or change in bowel habits  : NEGATIVE for dysuria, or hematuria, + nocturia  MUSCULOSKELETAL: NEGATIVE for significant arthralgias or myalgia  NEURO: NEGATIVE for  "weakness, dizziness or paresthesias  HEME: NEGATIVE for bleeding problems  PSYCHIATRIC: NEGATIVE for changes in mood or affect      Objective    /80   Pulse 68   Temp 97.7  F (36.5  C) (Temporal)   Resp 17   Ht 1.765 m (5' 9.5\")   Wt 83.1 kg (183 lb 1.6 oz)   SpO2 100%   BMI 26.65 kg/m    Body mass index is 26.65 kg/m .    Physical Exam   GENERAL: alert and no distress  EYES: Eyes grossly normal to inspection, PERRL and conjunctivae and sclerae normal  HENT: ear canals and TM's normal, nose and mouth without ulcers or lesions  RESP: lungs clear to auscultation - no rales, rhonchi or wheezes  CV: irregular rate and rhythm, normal S1 S2, no click or rub, no peripheral edema  ABDOMEN: soft, nontender, no hepatosplenomegaly, no masses and bowel sounds normal  NEURO: No focal changes  PSYCH: mentation appears normal, affect normal/bright        Signed Electronically by: Yared Gallardo MD    "

## 2025-07-29 NOTE — TELEPHONE ENCOUNTER
Referral received via [place] on 7/29/25.    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

## 2025-07-30 ENCOUNTER — PATIENT OUTREACH (OUTPATIENT)
Dept: CARE COORDINATION | Facility: CLINIC | Age: 75
End: 2025-07-30
Payer: COMMERCIAL

## 2025-08-09 ENCOUNTER — MYC MEDICAL ADVICE (OUTPATIENT)
Dept: INTERNAL MEDICINE | Facility: CLINIC | Age: 75
End: 2025-08-09
Payer: COMMERCIAL

## 2025-08-22 ENCOUNTER — RESULTS FOLLOW-UP (OUTPATIENT)
Dept: INTERNAL MEDICINE | Facility: CLINIC | Age: 75
End: 2025-08-22

## 2025-09-02 ENCOUNTER — MYC MEDICAL ADVICE (OUTPATIENT)
Dept: INTERNAL MEDICINE | Facility: CLINIC | Age: 75
End: 2025-09-02
Payer: COMMERCIAL

## (undated) RX ORDER — FENTANYL CITRATE 50 UG/ML
INJECTION, SOLUTION INTRAMUSCULAR; INTRAVENOUS
Status: DISPENSED
Start: 2018-10-03